# Patient Record
Sex: MALE | Race: WHITE | HISPANIC OR LATINO | Employment: UNEMPLOYED | URBAN - METROPOLITAN AREA
[De-identification: names, ages, dates, MRNs, and addresses within clinical notes are randomized per-mention and may not be internally consistent; named-entity substitution may affect disease eponyms.]

---

## 2019-09-10 ENCOUNTER — HOSPITAL ENCOUNTER (OUTPATIENT)
Dept: RADIOLOGY | Facility: MEDICAL CENTER | Age: 54
End: 2019-09-10

## 2019-09-10 ENCOUNTER — HOSPITAL ENCOUNTER (OUTPATIENT)
Facility: MEDICAL CENTER | Age: 54
End: 2019-09-10

## 2019-09-10 ENCOUNTER — ANESTHESIA (OUTPATIENT)
Dept: SURGERY | Facility: MEDICAL CENTER | Age: 54
DRG: 485 | End: 2019-09-10
Payer: COMMERCIAL

## 2019-09-10 ENCOUNTER — HOSPITAL ENCOUNTER (INPATIENT)
Facility: MEDICAL CENTER | Age: 54
LOS: 15 days | DRG: 485 | End: 2019-09-25
Attending: EMERGENCY MEDICINE | Admitting: HOSPITALIST
Payer: COMMERCIAL

## 2019-09-10 ENCOUNTER — ANESTHESIA EVENT (OUTPATIENT)
Dept: SURGERY | Facility: MEDICAL CENTER | Age: 54
DRG: 485 | End: 2019-09-10
Payer: COMMERCIAL

## 2019-09-10 DIAGNOSIS — M00.9 PYOGENIC ARTHRITIS OF LEFT KNEE JOINT, DUE TO UNSPECIFIED ORGANISM (HCC): ICD-10-CM

## 2019-09-10 DIAGNOSIS — M00.862 ARTHRITIS OF LEFT KNEE DUE TO OTHER BACTERIA (HCC): ICD-10-CM

## 2019-09-10 PROBLEM — E66.9 OBESITY: Status: ACTIVE | Noted: 2019-09-10

## 2019-09-10 PROBLEM — E78.5 HLD (HYPERLIPIDEMIA): Status: ACTIVE | Noted: 2019-09-10

## 2019-09-10 LAB
ERYTHROCYTE [SEDIMENTATION RATE] IN BLOOD BY WESTERGREN METHOD: 64 MM/HOUR (ref 0–20)
INR PPP: 1.13 (ref 0.87–1.13)
LACTATE BLD-SCNC: 1.8 MMOL/L (ref 0.5–2)
PROTHROMBIN TIME: 14.8 SEC (ref 12–14.6)

## 2019-09-10 PROCEDURE — 83605 ASSAY OF LACTIC ACID: CPT

## 2019-09-10 PROCEDURE — 160048 HCHG OR STATISTICAL LEVEL 1-5: Performed by: ORTHOPAEDIC SURGERY

## 2019-09-10 PROCEDURE — 160035 HCHG PACU - 1ST 60 MINS PHASE I: Performed by: ORTHOPAEDIC SURGERY

## 2019-09-10 PROCEDURE — 86140 C-REACTIVE PROTEIN: CPT

## 2019-09-10 PROCEDURE — 160036 HCHG PACU - EA ADDL 30 MINS PHASE I: Performed by: ORTHOPAEDIC SURGERY

## 2019-09-10 PROCEDURE — 500367 HCHG DRAIN KIT, HEMOVAC: Performed by: ORTHOPAEDIC SURGERY

## 2019-09-10 PROCEDURE — 160009 HCHG ANES TIME/MIN: Performed by: ORTHOPAEDIC SURGERY

## 2019-09-10 PROCEDURE — 160027 HCHG SURGERY MINUTES - 1ST 30 MINS LEVEL 2: Performed by: ORTHOPAEDIC SURGERY

## 2019-09-10 PROCEDURE — 87070 CULTURE OTHR SPECIMN AEROBIC: CPT

## 2019-09-10 PROCEDURE — 85610 PROTHROMBIN TIME: CPT

## 2019-09-10 PROCEDURE — 700102 HCHG RX REV CODE 250 W/ 637 OVERRIDE(OP)

## 2019-09-10 PROCEDURE — 87205 SMEAR GRAM STAIN: CPT

## 2019-09-10 PROCEDURE — 99291 CRITICAL CARE FIRST HOUR: CPT

## 2019-09-10 PROCEDURE — 700105 HCHG RX REV CODE 258: Performed by: ANESTHESIOLOGY

## 2019-09-10 PROCEDURE — 770001 HCHG ROOM/CARE - MED/SURG/GYN PRIV*

## 2019-09-10 PROCEDURE — 85652 RBC SED RATE AUTOMATED: CPT

## 2019-09-10 PROCEDURE — 99358 PROLONG SERVICE W/O CONTACT: CPT | Performed by: HOSPITALIST

## 2019-09-10 PROCEDURE — 500881 HCHG PACK, EXTREMITY: Performed by: ORTHOPAEDIC SURGERY

## 2019-09-10 PROCEDURE — 99223 1ST HOSP IP/OBS HIGH 75: CPT | Mod: AI | Performed by: HOSPITALIST

## 2019-09-10 PROCEDURE — 160002 HCHG RECOVERY MINUTES (STAT): Performed by: ORTHOPAEDIC SURGERY

## 2019-09-10 PROCEDURE — 501445 HCHG STAPLER, SKIN DISP: Performed by: ORTHOPAEDIC SURGERY

## 2019-09-10 PROCEDURE — 87075 CULTR BACTERIA EXCEPT BLOOD: CPT

## 2019-09-10 PROCEDURE — 700111 HCHG RX REV CODE 636 W/ 250 OVERRIDE (IP): Performed by: ANESTHESIOLOGY

## 2019-09-10 PROCEDURE — 87040 BLOOD CULTURE FOR BACTERIA: CPT | Mod: 91

## 2019-09-10 PROCEDURE — 160038 HCHG SURGERY MINUTES - EA ADDL 1 MIN LEVEL 2: Performed by: ORTHOPAEDIC SURGERY

## 2019-09-10 PROCEDURE — 0SBD0ZZ EXCISION OF LEFT KNEE JOINT, OPEN APPROACH: ICD-10-PCS | Performed by: ORTHOPAEDIC SURGERY

## 2019-09-10 PROCEDURE — 501838 HCHG SUTURE GENERAL: Performed by: ORTHOPAEDIC SURGERY

## 2019-09-10 PROCEDURE — A9270 NON-COVERED ITEM OR SERVICE: HCPCS

## 2019-09-10 PROCEDURE — 500440 HCHG DRESSING, STERILE ROLL (KERLIX): Performed by: ORTHOPAEDIC SURGERY

## 2019-09-10 RX ORDER — POLYETHYLENE GLYCOL 3350 17 G/17G
1 POWDER, FOR SOLUTION ORAL
Status: DISCONTINUED | OUTPATIENT
Start: 2019-09-10 | End: 2019-09-25 | Stop reason: HOSPADM

## 2019-09-10 RX ORDER — DEXAMETHASONE SODIUM PHOSPHATE 4 MG/ML
INJECTION, SOLUTION INTRA-ARTICULAR; INTRALESIONAL; INTRAMUSCULAR; INTRAVENOUS; SOFT TISSUE PRN
Status: DISCONTINUED | OUTPATIENT
Start: 2019-09-10 | End: 2019-09-10 | Stop reason: SURG

## 2019-09-10 RX ORDER — OXYCODONE HYDROCHLORIDE 10 MG/1
10 TABLET ORAL
Status: DISCONTINUED | OUTPATIENT
Start: 2019-09-10 | End: 2019-09-25 | Stop reason: HOSPADM

## 2019-09-10 RX ORDER — OXYCODONE HCL 5 MG/5 ML
10 SOLUTION, ORAL ORAL
Status: COMPLETED | OUTPATIENT
Start: 2019-09-10 | End: 2019-09-11

## 2019-09-10 RX ORDER — SODIUM CHLORIDE, SODIUM LACTATE, POTASSIUM CHLORIDE, CALCIUM CHLORIDE 600; 310; 30; 20 MG/100ML; MG/100ML; MG/100ML; MG/100ML
INJECTION, SOLUTION INTRAVENOUS CONTINUOUS
Status: DISCONTINUED | OUTPATIENT
Start: 2019-09-11 | End: 2019-09-11 | Stop reason: HOSPADM

## 2019-09-10 RX ORDER — ONDANSETRON 2 MG/ML
4 INJECTION INTRAMUSCULAR; INTRAVENOUS EVERY 4 HOURS PRN
Status: DISCONTINUED | OUTPATIENT
Start: 2019-09-10 | End: 2019-09-25 | Stop reason: HOSPADM

## 2019-09-10 RX ORDER — HYDROMORPHONE HYDROCHLORIDE 2 MG/ML
INJECTION, SOLUTION INTRAMUSCULAR; INTRAVENOUS; SUBCUTANEOUS PRN
Status: DISCONTINUED | OUTPATIENT
Start: 2019-09-10 | End: 2019-09-10 | Stop reason: SURG

## 2019-09-10 RX ORDER — OXYCODONE HYDROCHLORIDE 5 MG/1
5 TABLET ORAL
Status: DISCONTINUED | OUTPATIENT
Start: 2019-09-10 | End: 2019-09-25 | Stop reason: HOSPADM

## 2019-09-10 RX ORDER — SODIUM CHLORIDE, SODIUM LACTATE, POTASSIUM CHLORIDE, CALCIUM CHLORIDE 600; 310; 30; 20 MG/100ML; MG/100ML; MG/100ML; MG/100ML
INJECTION, SOLUTION INTRAVENOUS
Status: DISCONTINUED | OUTPATIENT
Start: 2019-09-10 | End: 2019-09-10 | Stop reason: SURG

## 2019-09-10 RX ORDER — PROMETHAZINE HYDROCHLORIDE 25 MG/1
12.5-25 TABLET ORAL EVERY 4 HOURS PRN
Status: DISCONTINUED | OUTPATIENT
Start: 2019-09-10 | End: 2019-09-25 | Stop reason: HOSPADM

## 2019-09-10 RX ORDER — OXYCODONE HCL 5 MG/5 ML
5 SOLUTION, ORAL ORAL
Status: COMPLETED | OUTPATIENT
Start: 2019-09-10 | End: 2019-09-11

## 2019-09-10 RX ORDER — ONDANSETRON 2 MG/ML
4 INJECTION INTRAMUSCULAR; INTRAVENOUS
Status: DISCONTINUED | OUTPATIENT
Start: 2019-09-10 | End: 2019-09-11 | Stop reason: HOSPADM

## 2019-09-10 RX ORDER — DIPHENHYDRAMINE HYDROCHLORIDE 50 MG/ML
12.5 INJECTION INTRAMUSCULAR; INTRAVENOUS
Status: DISCONTINUED | OUTPATIENT
Start: 2019-09-10 | End: 2019-09-11 | Stop reason: HOSPADM

## 2019-09-10 RX ORDER — HYDROMORPHONE HYDROCHLORIDE 1 MG/ML
0.1 INJECTION, SOLUTION INTRAMUSCULAR; INTRAVENOUS; SUBCUTANEOUS
Status: DISCONTINUED | OUTPATIENT
Start: 2019-09-10 | End: 2019-09-11 | Stop reason: HOSPADM

## 2019-09-10 RX ORDER — MEPERIDINE HYDROCHLORIDE 25 MG/ML
12.5 INJECTION INTRAMUSCULAR; INTRAVENOUS; SUBCUTANEOUS
Status: DISCONTINUED | OUTPATIENT
Start: 2019-09-10 | End: 2019-09-11 | Stop reason: HOSPADM

## 2019-09-10 RX ORDER — PROMETHAZINE HYDROCHLORIDE 25 MG/1
12.5-25 SUPPOSITORY RECTAL EVERY 4 HOURS PRN
Status: DISCONTINUED | OUTPATIENT
Start: 2019-09-10 | End: 2019-09-25 | Stop reason: HOSPADM

## 2019-09-10 RX ORDER — HYDROMORPHONE HYDROCHLORIDE 1 MG/ML
0.4 INJECTION, SOLUTION INTRAMUSCULAR; INTRAVENOUS; SUBCUTANEOUS
Status: DISCONTINUED | OUTPATIENT
Start: 2019-09-10 | End: 2019-09-11 | Stop reason: HOSPADM

## 2019-09-10 RX ORDER — HYDROMORPHONE HYDROCHLORIDE 1 MG/ML
0.2 INJECTION, SOLUTION INTRAMUSCULAR; INTRAVENOUS; SUBCUTANEOUS
Status: DISCONTINUED | OUTPATIENT
Start: 2019-09-10 | End: 2019-09-11 | Stop reason: HOSPADM

## 2019-09-10 RX ORDER — AMOXICILLIN 250 MG
2 CAPSULE ORAL 2 TIMES DAILY
Status: DISCONTINUED | OUTPATIENT
Start: 2019-09-10 | End: 2019-09-25 | Stop reason: HOSPADM

## 2019-09-10 RX ORDER — BISACODYL 10 MG
10 SUPPOSITORY, RECTAL RECTAL
Status: DISCONTINUED | OUTPATIENT
Start: 2019-09-10 | End: 2019-09-25 | Stop reason: HOSPADM

## 2019-09-10 RX ORDER — ONDANSETRON 2 MG/ML
INJECTION INTRAMUSCULAR; INTRAVENOUS PRN
Status: DISCONTINUED | OUTPATIENT
Start: 2019-09-10 | End: 2019-09-10 | Stop reason: SURG

## 2019-09-10 RX ORDER — SODIUM CHLORIDE 9 MG/ML
INJECTION, SOLUTION INTRAVENOUS CONTINUOUS
Status: DISCONTINUED | OUTPATIENT
Start: 2019-09-10 | End: 2019-09-11

## 2019-09-10 RX ORDER — ONDANSETRON 4 MG/1
4 TABLET, ORALLY DISINTEGRATING ORAL EVERY 4 HOURS PRN
Status: DISCONTINUED | OUTPATIENT
Start: 2019-09-10 | End: 2019-09-25 | Stop reason: HOSPADM

## 2019-09-10 RX ORDER — MORPHINE SULFATE 4 MG/ML
4 INJECTION, SOLUTION INTRAMUSCULAR; INTRAVENOUS
Status: DISCONTINUED | OUTPATIENT
Start: 2019-09-10 | End: 2019-09-25 | Stop reason: HOSPADM

## 2019-09-10 RX ORDER — HALOPERIDOL 5 MG/ML
1 INJECTION INTRAMUSCULAR
Status: DISCONTINUED | OUTPATIENT
Start: 2019-09-10 | End: 2019-09-11 | Stop reason: HOSPADM

## 2019-09-10 RX ORDER — OXYCODONE HCL 5 MG/5 ML
SOLUTION, ORAL ORAL
Status: COMPLETED
Start: 2019-09-10 | End: 2019-09-10

## 2019-09-10 RX ORDER — PROCHLORPERAZINE EDISYLATE 5 MG/ML
5-10 INJECTION INTRAMUSCULAR; INTRAVENOUS EVERY 4 HOURS PRN
Status: DISCONTINUED | OUTPATIENT
Start: 2019-09-10 | End: 2019-09-25 | Stop reason: HOSPADM

## 2019-09-10 RX ORDER — ACETAMINOPHEN 325 MG/1
650 TABLET ORAL EVERY 6 HOURS PRN
Status: CANCELLED | OUTPATIENT
Start: 2019-09-10

## 2019-09-10 RX ADMIN — SODIUM CHLORIDE, POTASSIUM CHLORIDE, SODIUM LACTATE AND CALCIUM CHLORIDE: 600; 310; 30; 20 INJECTION, SOLUTION INTRAVENOUS at 22:54

## 2019-09-10 RX ADMIN — OXYCODONE HYDROCHLORIDE 5 MG: 5 SOLUTION ORAL at 23:57

## 2019-09-10 RX ADMIN — Medication 5 MG: at 23:57

## 2019-09-10 RX ADMIN — ONDANSETRON 4 MG: 2 INJECTION INTRAMUSCULAR; INTRAVENOUS at 23:19

## 2019-09-10 RX ADMIN — LIDOCAINE HYDROCHLORIDE 100 MG: 20 INJECTION, SOLUTION INTRAVENOUS at 22:57

## 2019-09-10 RX ADMIN — HYDROMORPHONE HYDROCHLORIDE 1 MG: 2 INJECTION, SOLUTION INTRAMUSCULAR; INTRAVENOUS; SUBCUTANEOUS at 23:20

## 2019-09-10 RX ADMIN — HYDROMORPHONE HYDROCHLORIDE 1 MG: 2 INJECTION, SOLUTION INTRAMUSCULAR; INTRAVENOUS; SUBCUTANEOUS at 23:33

## 2019-09-10 RX ADMIN — VANCOMYCIN HYDROCHLORIDE 1 G: 1 INJECTION, POWDER, LYOPHILIZED, FOR SOLUTION INTRAVENOUS at 22:54

## 2019-09-10 RX ADMIN — PROPOFOL 200 MG: 10 INJECTION, EMULSION INTRAVENOUS at 22:57

## 2019-09-10 RX ADMIN — DEXAMETHASONE SODIUM PHOSPHATE 8 MG: 4 INJECTION, SOLUTION INTRA-ARTICULAR; INTRALESIONAL; INTRAMUSCULAR; INTRAVENOUS; SOFT TISSUE at 22:57

## 2019-09-10 ASSESSMENT — ENCOUNTER SYMPTOMS
DEPRESSION: 0
WEAKNESS: 0
CHILLS: 0
PALPITATIONS: 0
HALLUCINATIONS: 0
SHORTNESS OF BREATH: 0
FLANK PAIN: 0
VOMITING: 0
MYALGIAS: 0
HEARTBURN: 0
FOCAL WEAKNESS: 0
FEVER: 0
ABDOMINAL PAIN: 0
DIZZINESS: 0
BLURRED VISION: 0
DOUBLE VISION: 0
NAUSEA: 0
BRUISES/BLEEDS EASILY: 0
SENSORY CHANGE: 0
SPEECH CHANGE: 0
COUGH: 0
HEMOPTYSIS: 0
EYE DISCHARGE: 0

## 2019-09-10 ASSESSMENT — LIFESTYLE VARIABLES
DO YOU DRINK ALCOHOL: NO
SUBSTANCE_ABUSE: 0

## 2019-09-10 ASSESSMENT — PAIN SCALES - GENERAL: PAIN_LEVEL: 5

## 2019-09-11 PROBLEM — R73.9 ACUTE HYPERGLYCEMIA: Status: ACTIVE | Noted: 2019-09-11

## 2019-09-11 PROBLEM — E87.20 LACTIC ACIDOSIS: Status: ACTIVE | Noted: 2019-09-11

## 2019-09-11 LAB
ALBUMIN SERPL BCP-MCNC: 3.7 G/DL (ref 3.2–4.9)
ALBUMIN/GLOB SERPL: 1 G/DL
ALP SERPL-CCNC: 63 U/L (ref 30–99)
ALT SERPL-CCNC: 13 U/L (ref 2–50)
ANION GAP SERPL CALC-SCNC: 9 MMOL/L (ref 0–11.9)
AST SERPL-CCNC: 17 U/L (ref 12–45)
BASOPHILS # BLD AUTO: 0.2 % (ref 0–1.8)
BASOPHILS # BLD: 0.02 K/UL (ref 0–0.12)
BILIRUB SERPL-MCNC: 1.1 MG/DL (ref 0.1–1.5)
BUN SERPL-MCNC: 20 MG/DL (ref 8–22)
CALCIUM SERPL-MCNC: 8.8 MG/DL (ref 8.5–10.5)
CHLORIDE SERPL-SCNC: 105 MMOL/L (ref 96–112)
CO2 SERPL-SCNC: 21 MMOL/L (ref 20–33)
CREAT SERPL-MCNC: 0.92 MG/DL (ref 0.5–1.4)
CRP SERPL HS-MCNC: 19.99 MG/DL (ref 0–0.75)
EOSINOPHIL # BLD AUTO: 0 K/UL (ref 0–0.51)
EOSINOPHIL NFR BLD: 0 % (ref 0–6.9)
ERYTHROCYTE [DISTWIDTH] IN BLOOD BY AUTOMATED COUNT: 46.3 FL (ref 35.9–50)
EST. AVERAGE GLUCOSE BLD GHB EST-MCNC: 126 MG/DL
GLOBULIN SER CALC-MCNC: 3.6 G/DL (ref 1.9–3.5)
GLUCOSE BLD-MCNC: 170 MG/DL (ref 65–99)
GLUCOSE SERPL-MCNC: 240 MG/DL (ref 65–99)
GRAM STN SPEC: NORMAL
HBA1C MFR BLD: 6 % (ref 0–5.6)
HCT VFR BLD AUTO: 41.1 % (ref 42–52)
HGB BLD-MCNC: 13.6 G/DL (ref 14–18)
IMM GRANULOCYTES # BLD AUTO: 0.04 K/UL (ref 0–0.11)
IMM GRANULOCYTES NFR BLD AUTO: 0.4 % (ref 0–0.9)
LACTATE BLD-SCNC: 1.8 MMOL/L (ref 0.5–2)
LACTATE BLD-SCNC: 1.9 MMOL/L (ref 0.5–2)
LACTATE BLD-SCNC: 2.1 MMOL/L (ref 0.5–2)
LACTATE BLD-SCNC: 2.4 MMOL/L (ref 0.5–2)
LACTATE BLD-SCNC: 3 MMOL/L (ref 0.5–2)
LACTATE BLD-SCNC: 3.4 MMOL/L (ref 0.5–2)
LYMPHOCYTES # BLD AUTO: 0.56 K/UL (ref 1–4.8)
LYMPHOCYTES NFR BLD: 5.7 % (ref 22–41)
MCH RBC QN AUTO: 30.4 PG (ref 27–33)
MCHC RBC AUTO-ENTMCNC: 33.1 G/DL (ref 33.7–35.3)
MCV RBC AUTO: 91.7 FL (ref 81.4–97.8)
MONOCYTES # BLD AUTO: 0.48 K/UL (ref 0–0.85)
MONOCYTES NFR BLD AUTO: 4.9 % (ref 0–13.4)
NEUTROPHILS # BLD AUTO: 8.74 K/UL (ref 1.82–7.42)
NEUTROPHILS NFR BLD: 88.8 % (ref 44–72)
NRBC # BLD AUTO: 0 K/UL
NRBC BLD-RTO: 0 /100 WBC
PLATELET # BLD AUTO: 253 K/UL (ref 164–446)
PMV BLD AUTO: 10.1 FL (ref 9–12.9)
POTASSIUM SERPL-SCNC: 4.5 MMOL/L (ref 3.6–5.5)
PROT SERPL-MCNC: 7.3 G/DL (ref 6–8.2)
RBC # BLD AUTO: 4.48 M/UL (ref 4.7–6.1)
SIGNIFICANT IND 70042: NORMAL
SITE SITE: NORMAL
SODIUM SERPL-SCNC: 135 MMOL/L (ref 135–145)
SOURCE SOURCE: NORMAL
WBC # BLD AUTO: 9.8 K/UL (ref 4.8–10.8)

## 2019-09-11 PROCEDURE — 700111 HCHG RX REV CODE 636 W/ 250 OVERRIDE (IP): Performed by: ANESTHESIOLOGY

## 2019-09-11 PROCEDURE — 99233 SBSQ HOSP IP/OBS HIGH 50: CPT | Performed by: INTERNAL MEDICINE

## 2019-09-11 PROCEDURE — 36415 COLL VENOUS BLD VENIPUNCTURE: CPT

## 2019-09-11 PROCEDURE — 80053 COMPREHEN METABOLIC PANEL: CPT

## 2019-09-11 PROCEDURE — 770001 HCHG ROOM/CARE - MED/SURG/GYN PRIV*

## 2019-09-11 PROCEDURE — A9270 NON-COVERED ITEM OR SERVICE: HCPCS | Performed by: HOSPITALIST

## 2019-09-11 PROCEDURE — A9270 NON-COVERED ITEM OR SERVICE: HCPCS

## 2019-09-11 PROCEDURE — 83605 ASSAY OF LACTIC ACID: CPT | Mod: 91

## 2019-09-11 PROCEDURE — 97165 OT EVAL LOW COMPLEX 30 MIN: CPT

## 2019-09-11 PROCEDURE — 700102 HCHG RX REV CODE 250 W/ 637 OVERRIDE(OP): Performed by: HOSPITALIST

## 2019-09-11 PROCEDURE — 82962 GLUCOSE BLOOD TEST: CPT

## 2019-09-11 PROCEDURE — 97161 PT EVAL LOW COMPLEX 20 MIN: CPT

## 2019-09-11 PROCEDURE — 700105 HCHG RX REV CODE 258: Performed by: HOSPITALIST

## 2019-09-11 PROCEDURE — 85025 COMPLETE CBC W/AUTO DIFF WBC: CPT

## 2019-09-11 PROCEDURE — 700105 HCHG RX REV CODE 258: Performed by: INTERNAL MEDICINE

## 2019-09-11 PROCEDURE — 700102 HCHG RX REV CODE 250 W/ 637 OVERRIDE(OP): Performed by: NURSE PRACTITIONER

## 2019-09-11 PROCEDURE — 83036 HEMOGLOBIN GLYCOSYLATED A1C: CPT

## 2019-09-11 PROCEDURE — 700111 HCHG RX REV CODE 636 W/ 250 OVERRIDE (IP): Performed by: HOSPITALIST

## 2019-09-11 PROCEDURE — 700102 HCHG RX REV CODE 250 W/ 637 OVERRIDE(OP)

## 2019-09-11 PROCEDURE — 700111 HCHG RX REV CODE 636 W/ 250 OVERRIDE (IP)

## 2019-09-11 RX ORDER — OXYCODONE HCL 5 MG/5 ML
SOLUTION, ORAL ORAL
Status: COMPLETED
Start: 2019-09-11 | End: 2019-09-11

## 2019-09-11 RX ORDER — SODIUM CHLORIDE 9 MG/ML
INJECTION, SOLUTION INTRAVENOUS CONTINUOUS
Status: DISPENSED | OUTPATIENT
Start: 2019-09-11 | End: 2019-09-12

## 2019-09-11 RX ORDER — SODIUM CHLORIDE 9 MG/ML
1000 INJECTION, SOLUTION INTRAVENOUS ONCE
Status: COMPLETED | OUTPATIENT
Start: 2019-09-11 | End: 2019-09-11

## 2019-09-11 RX ADMIN — SENNOSIDES, DOCUSATE SODIUM 2 TABLET: 50; 8.6 TABLET, FILM COATED ORAL at 05:45

## 2019-09-11 RX ADMIN — FENTANYL CITRATE 25 MCG: 50 INJECTION, SOLUTION INTRAMUSCULAR; INTRAVENOUS at 00:01

## 2019-09-11 RX ADMIN — SODIUM CHLORIDE: 9 INJECTION, SOLUTION INTRAVENOUS at 13:30

## 2019-09-11 RX ADMIN — OXYCODONE HYDROCHLORIDE 10 MG: 10 TABLET ORAL at 17:15

## 2019-09-11 RX ADMIN — OXYCODONE HYDROCHLORIDE 10 MG: 10 TABLET ORAL at 11:39

## 2019-09-11 RX ADMIN — FENTANYL CITRATE 50 MCG: 50 INJECTION, SOLUTION INTRAMUSCULAR; INTRAVENOUS at 00:24

## 2019-09-11 RX ADMIN — OXYCODONE HYDROCHLORIDE 5 MG: 5 SOLUTION ORAL at 00:01

## 2019-09-11 RX ADMIN — SODIUM CHLORIDE 1000 ML: 9 INJECTION, SOLUTION INTRAVENOUS at 12:45

## 2019-09-11 RX ADMIN — OXYCODONE HYDROCHLORIDE 10 MG: 10 TABLET ORAL at 14:36

## 2019-09-11 RX ADMIN — SENNOSIDES, DOCUSATE SODIUM 2 TABLET: 50; 8.6 TABLET, FILM COATED ORAL at 17:15

## 2019-09-11 RX ADMIN — OXYCODONE HYDROCHLORIDE 10 MG: 10 TABLET ORAL at 05:49

## 2019-09-11 RX ADMIN — VANCOMYCIN HYDROCHLORIDE 2800 MG: 500 INJECTION, POWDER, LYOPHILIZED, FOR SOLUTION INTRAVENOUS at 01:47

## 2019-09-11 RX ADMIN — VANCOMYCIN HYDROCHLORIDE 1900 MG: 500 INJECTION, POWDER, LYOPHILIZED, FOR SOLUTION INTRAVENOUS at 18:15

## 2019-09-11 RX ADMIN — OXYCODONE HYDROCHLORIDE 10 MG: 10 TABLET ORAL at 21:01

## 2019-09-11 RX ADMIN — Medication 5 MG: at 00:01

## 2019-09-11 RX ADMIN — MORPHINE SULFATE 4 MG: 4 INJECTION INTRAVENOUS at 02:39

## 2019-09-11 RX ADMIN — SODIUM CHLORIDE: 9 INJECTION, SOLUTION INTRAVENOUS at 01:33

## 2019-09-11 RX ADMIN — OXYCODONE HYDROCHLORIDE 10 MG: 10 TABLET ORAL at 08:21

## 2019-09-11 RX ADMIN — OXYCODONE HYDROCHLORIDE 10 MG: 10 TABLET ORAL at 01:45

## 2019-09-11 RX ADMIN — MORPHINE SULFATE 4 MG: 4 INJECTION INTRAVENOUS at 21:55

## 2019-09-11 RX ADMIN — FENTANYL CITRATE 25 MCG: 50 INJECTION, SOLUTION INTRAMUSCULAR; INTRAVENOUS at 00:07

## 2019-09-11 RX ADMIN — MORPHINE SULFATE 4 MG: 4 INJECTION INTRAVENOUS at 18:15

## 2019-09-11 ASSESSMENT — GAIT ASSESSMENTS
DEVIATION: ANTALGIC;BRADYKINETIC;DECREASED HEEL STRIKE;DECREASED TOE OFF
DISTANCE (FEET): 35
ASSISTIVE DEVICE: FRONT WHEEL WALKER
GAIT LEVEL OF ASSIST: SUPERVISED

## 2019-09-11 ASSESSMENT — COGNITIVE AND FUNCTIONAL STATUS - GENERAL
SUGGESTED CMS G CODE MODIFIER DAILY ACTIVITY: CI
HELP NEEDED FOR BATHING: A LITTLE
DAILY ACTIVITIY SCORE: 22
DAILY ACTIVITIY SCORE: 23
MOBILITY SCORE: 18
WALKING IN HOSPITAL ROOM: A LITTLE
TURNING FROM BACK TO SIDE WHILE IN FLAT BAD: A LITTLE
MOVING FROM LYING ON BACK TO SITTING ON SIDE OF FLAT BED: A LITTLE
SUGGESTED CMS G CODE MODIFIER MOBILITY: CK
STANDING UP FROM CHAIR USING ARMS: A LITTLE
WALKING IN HOSPITAL ROOM: A LOT
CLIMB 3 TO 5 STEPS WITH RAILING: A LITTLE
MOBILITY SCORE: 16
SUGGESTED CMS G CODE MODIFIER DAILY ACTIVITY: CJ
MOVING TO AND FROM BED TO CHAIR: A LITTLE
DRESSING REGULAR LOWER BODY CLOTHING: A LITTLE
CLIMB 3 TO 5 STEPS WITH RAILING: A LOT
STANDING UP FROM CHAIR USING ARMS: A LITTLE
SUGGESTED CMS G CODE MODIFIER MOBILITY: CK
MOVING TO AND FROM BED TO CHAIR: A LITTLE
DRESSING REGULAR LOWER BODY CLOTHING: A LITTLE
TURNING FROM BACK TO SIDE WHILE IN FLAT BAD: A LITTLE
MOVING FROM LYING ON BACK TO SITTING ON SIDE OF FLAT BED: A LITTLE

## 2019-09-11 ASSESSMENT — LIFESTYLE VARIABLES
TOTAL SCORE: 0
HOW MANY TIMES IN THE PAST YEAR HAVE YOU HAD 5 OR MORE DRINKS IN A DAY: 0
DO YOU DRINK ALCOHOL: NO
EVER_SMOKED: NEVER
TOTAL SCORE: 0
HAVE PEOPLE ANNOYED YOU BY CRITICIZING YOUR DRINKING: NO
TOTAL SCORE: 0
EVER HAD A DRINK FIRST THING IN THE MORNING TO STEADY YOUR NERVES TO GET RID OF A HANGOVER: NO
CONSUMPTION TOTAL: NEGATIVE
EVER HAD A DRINK FIRST THING IN THE MORNING TO STEADY YOUR NERVES TO GET RID OF A HANGOVER: NO
ON A TYPICAL DAY WHEN YOU DRINK ALCOHOL HOW MANY DRINKS DO YOU HAVE: 0
HAVE YOU EVER FELT YOU SHOULD CUT DOWN ON YOUR DRINKING: NO
HAVE PEOPLE ANNOYED YOU BY CRITICIZING YOUR DRINKING: NO
EVER FELT BAD OR GUILTY ABOUT YOUR DRINKING: NO
AVERAGE NUMBER OF DAYS PER WEEK YOU HAVE A DRINK CONTAINING ALCOHOL: 0
ON A TYPICAL DAY WHEN YOU DRINK ALCOHOL HOW MANY DRINKS DO YOU HAVE: 0
TOTAL SCORE: 0
TOTAL SCORE: 0
HAVE YOU EVER FELT YOU SHOULD CUT DOWN ON YOUR DRINKING: NO
TOTAL SCORE: 0
ALCOHOL_USE: NO
AVERAGE NUMBER OF DAYS PER WEEK YOU HAVE A DRINK CONTAINING ALCOHOL: 0
CONSUMPTION TOTAL: NEGATIVE
EVER FELT BAD OR GUILTY ABOUT YOUR DRINKING: NO
HOW MANY TIMES IN THE PAST YEAR HAVE YOU HAD 5 OR MORE DRINKS IN A DAY: 0

## 2019-09-11 ASSESSMENT — ENCOUNTER SYMPTOMS
NAUSEA: 0
SHORTNESS OF BREATH: 0
FEVER: 0
CONSTIPATION: 0
CHILLS: 0
DIARRHEA: 0
PALPITATIONS: 0
DIZZINESS: 0
VOMITING: 0
FOCAL WEAKNESS: 0
ABDOMINAL PAIN: 0

## 2019-09-11 ASSESSMENT — PATIENT HEALTH QUESTIONNAIRE - PHQ9
1. LITTLE INTEREST OR PLEASURE IN DOING THINGS: NOT AT ALL
SUM OF ALL RESPONSES TO PHQ9 QUESTIONS 1 AND 2: 0
2. FEELING DOWN, DEPRESSED, IRRITABLE, OR HOPELESS: NOT AT ALL

## 2019-09-11 ASSESSMENT — ACTIVITIES OF DAILY LIVING (ADL): TOILETING: INDEPENDENT

## 2019-09-11 NOTE — PROGRESS NOTES
Pharmacy Kinetics 53 y.o. male on vancomycin day # 1 2019    New start vancomycin - 2800 mg loading dose given  Provider specified end date: TBD    Indication for Treatment: SSTI, septic arthritis    Pertinent history per medical record: Admitted on 9/10/2019 from [facility] for an orthopedic surgery consultation regarding a septic L knee. Approximately 3 days prior to admission he developed pain and swelling to his L knee with no acute injury. Pt has also never had knee surgery. At [facility] they did an arthrocentesis that had an elevated white count and GPC on the Gram stain. He was taken to the OR for I&D with drain placement on . Empiric vancomycin is being started    Other antibiotics: None    Allergies: Patient has no known allergies.     Concerns for renal function: BMI >30    Pertinent cultures to date:   · 9/10 Blood x2: in process  · 9/10 Wound: in process    No results for input(s): WBC, NEUTSPOLYS, BANDSSTABS in the last 72 hours.  No results for input(s): BUN, CREATININE, ALBUMIN in the last 72 hours.  No results for input(s): VANCOTROUGH, VANCOPEAK, VANCORANDOM in the last 72 hours.    Intake/Output Summary (Last 24 hours) at 2019 0142  Last data filed at 2019 0041  Gross per 24 hour   Intake 850 ml   Output --   Net 850 ml      /78   Pulse 99   Temp 37.2 °C (99 °F) (Temporal)   Resp 18   Ht 1.829 m (6')   Wt 111.1 kg (245 lb)   SpO2 95%  Temp (24hrs), Av.9 °C (98.5 °F), Min:36.3 °C (97.3 °F), Max:37.3 °C (99.2 °F)      A/P   1. Vancomycin dose change: Start 1900 mg IV q12h (@ 0200 & 1400 - first dose at 1400)  2. Next vancomycin level: In ~2 days (not ordered)  3. Goal trough: 12-16 mcg/mL  4. Comments: Pt started on vancomycin per protocol. Will need to follow outside facility cx as well as our own to help guide therapy. Checking BMP in the AM to make sure pt's renal function will support protocol dosing. Will check a trough as pt approaches steady  [facility]    Mikaela Baeza, PharmD, BCPS

## 2019-09-11 NOTE — ASSESSMENT & PLAN NOTE
-S/p I&D's 9/10/2019 and 9/20/2019.  -Pain and swelling continue to improve.  Continue pain control using narcotics, baclofen, & nonpharmacologic methods of pain control (e.g. Ice packs).  Wean off as tolerated.  -The patient states he has been ambulating with a front wheel walker.  Now able to TTWB.  Also mobilizing around the room throughout the day.  Has been in a chair for every meal, per patient report.  -Currently on IV ceftriaxone. IV vancomycin discontinued. Was managed by ID. Anticipated antibiotic end date is 10/8/2019.    -Continue working with PT/OT and the nursing staff on frequent mobilization.  -Recommend orthopedic follow up in 7-10 days after hospital discharge.

## 2019-09-11 NOTE — CONSULTS
DATE OF SERVICE:  09/10/2019    HISTORY OF PRESENT ILLNESS:  Patient is a 53-year-old who presented to the ER   after being transferred from [facility] after diagnosis of a   septic left knee joint was confirmed there.  He has been having increasing   knee pain for the last 2-3 days.  He cannot recall any specific injury or   break in the skin.  He was treated for 3 days of colchicine thinking this   might be gout, it was not helping.  He had an aspiration done at [facility]   as well as an ultrasound, which showed fluid collection in the medial soft   tissues.  He had an aspiration showing 112,000 white blood cells in his left   knee synovial fluid with 90% neutrophils and gram-positive cocci.    PAST MEDICAL HISTORY:  Includes hyperlipidemia.    SOCIAL HISTORY:  He is currently incarcerated.    MEDICATIONS:  None.    ALLERGIES:  No known drug allergies.    PHYSICAL EXAMINATION:  GENERAL:  He is afebrile.  VITAL SIGNS:  Stable.  EXTREMITIES:  Left lower extremity, he has a large effusion.  It is slightly   warm and red.  He has significant pain with any range of motion of the knee.    LABORATORY DATA:  Reviewed from [facility], which showed an increased cell   count of 112,000 from a left knee aspiration.    IMPRESSION AND PLAN:  This is a 53-year-old with left knee septic joint.  Plan   will be to take to the operating room for an irrigation and debridement.  We   will do this open, take further cultures and anticipate putting a drain in.    He will be admitted by the hospitalist service.  Anticipate IV antibiotics per   hospitalist service as well.       ____________________________________     MD YRIS STEIN / MEENAKSHI    DD:  09/10/2019 22:38:49  DT:  09/11/2019 01:24:19    D#:  [id number]  Job#:  [id number]

## 2019-09-11 NOTE — ED TRIAGE NOTES
Chief Complaint   Patient presents with   • Leg Pain     Pt BIB EMS and law enforcement.  Pt transfer with septic left knee.  Pt seen by orthopedics and plan is to go to surgery [facility].  Pt states 8/10 pain in knee.  Pt given 4 mg morphine prior to transfer.

## 2019-09-11 NOTE — ANESTHESIA PREPROCEDURE EVALUATION
Relevant Problems   Other   (+) Arthritis, septic (HCC)   (+) Obesity       Physical Exam    Airway   Mallampati: II  TM distance: >3 FB  Neck ROM: full       Cardiovascular - normal exam  Rhythm: regular  Rate: normal  (-) murmur     Dental - normal exam         Pulmonary - normal exam  Breath sounds clear to auscultation     Abdominal    Neurological - normal exam                 Anesthesia Plan    ASA 2- EMERGENT   ASA physical status emergent criteria: acutely contaminated wound or identified infection source    Plan - general       Airway plan will be LMA        Induction: intravenous    Postoperative Plan: Postoperative administration of opioids is intended.    Pertinent diagnostic labs and testing reviewed    Informed Consent:    Anesthetic plan and risks discussed with patient.

## 2019-09-11 NOTE — OR NURSING
2339: Pt arrived from OR. Monitors applied and report received from MD and RN. Pt sedated on arrival with LMA in place. VSS with 6 L O2 via blow-by. Left leg elevated on pillows. Ice pack applied.     0040: Pt A&O. VSS on 2 L O2 via NC. Left knee elevated on pillows and ice pack in place. Following pain medication administration, pt reports pain at a more tolerable level. Surgical incision CDI. Hemovac compressed. Scant drainage in collection device.   Pt tolerating PO.   Report called to DIANNE Parkinson. Waiting for transport.

## 2019-09-11 NOTE — ASSESSMENT & PLAN NOTE
-Profile done 9/22/19 showed mildly low HDL but was otherwise normal.  Will continue home atorvastatin.   -Follow-up outpatient.

## 2019-09-11 NOTE — H&P
Hospital Medicine History & Physical Note    Date of Service  9/10/2019    Primary Care Physician  No primary care provider on file.    Consultants  ortho    Code Status  full    Chief Complaint  Left knee swelling, pain     History of Presenting Illness  53 y.o. male who presented 9/10/2019 with past medical history of hyperlipidemia presents with left knee swelling.  Patient is transferred from outside hospital for evaluation of septic left knee.  He started developing left knee pain and swelling over the last several days.  He works on the floor in the long term.  His pain significant worsened while he was crawling on the ground.  Is also had throbbing severe progressively worsening pain no fever no chills no sweats.  Outside hospital arthrocentesis reveals markedly elevated WBC counts consistent with septic arthritis.  An initial preliminary Gram stain showed gram-positive cocci.  Will be admitted to the hospital for surgical evaluation    Upon review of outside hospital records vital signs temperature 36.4 97% on room air respiratory rate 18 heart rate 74 blood pressure 134/86 ultrasound venous Doppler unilateral left leg no DVT is present in the left lower extremity fluid collection seen medially in the soft tissues by the knee extending into the calf posterior measuring 7 x 2 cm WBC count 8.8 hemoglobin 14.5 platelet count 228 glucose 104 BUN 18 creatinine 0.7  sodium 140 potassium 4.8 CO2 22 anion gap 12 calcium 9.1 uric acid 4.3 LFTs unremarkable  Arthrocentesis performed at outside hospital yellow cloudy total neutrophil count 112,000, red blood cell count 4000    Review of Systems  Review of Systems   Constitutional: Negative for chills and fever.   HENT: Negative for congestion, hearing loss and tinnitus.    Eyes: Negative for blurred vision, double vision and discharge.   Respiratory: Negative for cough, hemoptysis and shortness of breath.    Cardiovascular: Positive for leg swelling. Negative for  chest pain and palpitations.   Gastrointestinal: Negative for abdominal pain, heartburn, nausea and vomiting.   Genitourinary: Negative for dysuria and flank pain.   Musculoskeletal: Positive for joint pain. Negative for myalgias.   Skin: Negative for rash.   Neurological: Negative for dizziness, sensory change, speech change, focal weakness and weakness.   Endo/Heme/Allergies: Negative for environmental allergies. Does not bruise/bleed easily.   Psychiatric/Behavioral: Negative for depression, hallucinations and substance abuse.       Past Medical History  hld    Surgical History  Reviewed and not pertinent    Family History  Reviewed and not pertinent     Social History  No alcohol use, no drug use or smoking    Allergies  No Known Allergies    Medications  None       Physical Exam  Temp:  [36.6 °C (97.9 °F)] 36.6 °C (97.9 °F)    Physical Exam   Constitutional: He is oriented to person, place, and time. He appears well-developed and well-nourished. He appears distressed.   HENT:   Head: Normocephalic and atraumatic.   Eyes: Pupils are equal, round, and reactive to light. Conjunctivae and EOM are normal.   Neck: Normal range of motion. Neck supple. No JVD present.   Cardiovascular: Normal rate, regular rhythm, normal heart sounds and intact distal pulses.   No murmur heard.  Pulmonary/Chest: Effort normal and breath sounds normal. No respiratory distress. He exhibits no tenderness.   Abdominal: Soft. Bowel sounds are normal. He exhibits no distension. There is no tenderness.   Musculoskeletal: Normal range of motion. He exhibits edema.   Left knee edema pain with ROM    Neurological: He is alert and oriented to person, place, and time. No cranial nerve deficit. He exhibits normal muscle tone.   Skin: Skin is warm and dry. No erythema.   Psychiatric: He has a normal mood and affect. His behavior is normal. Judgment and thought content normal.   Nursing note and vitals reviewed.      Laboratory:     No results for  input(s): WBC, RBC, HEMOGLOBIN, HEMATOCRIT, MCV, MCH, RDW, PLATELETCT, MPV, NEUTSPOLYS, LYMPHOCYTES, MONOCYTES, EOSINOPHILS, BASOPHILS, RBCMORPHOLO in the last 72 hours.        No results for input(s): ALTSGPT, ASTSGOT, ALKPHOSPHAT, TBILIRUBIN, DBILIRUBIN, GAMMAGT, AMYLASE, LIPASE, ALB, PREALBUMIN, GLUCOSE in the last 72 hours.      No results for input(s): NTPROBNP in the last 72 hours.      No results for input(s): TROPONINT in the last 72 hours.  vital signs temperature 36.4 97% on room air respiratory rate 18 heart rate 74 blood pressure 134/86 ultrasound venous Doppler unilateral left leg no DVT is present in the left lower extremity fluid collection seen medially in the soft tissues by the knee extending into the calf posterior measuring 7 x 2 cm WBC count 8.8 hemoglobin 14.5 platelet count 228 glucose 104 BUN 18 creatinine 0.7  sodium 140 potassium 4.8 CO2 22 anion gap 12 calcium 9.1 uric acid 4.3 LFTs unremarkable    Urinalysis:    No results found     Imaging:  US-FOREIGN FILM ULTRASOUND   Final Result        Arthrocentesis performed at outside hospital yellow cloudy total neutrophil count 112,000, red blood cell count 4000      Assessment/Plan:  I anticipate this patient will require at least two midnights for appropriate medical management, necessitating inpatient admission.    * Arthritis, septic (HCC)  Assessment & Plan  Left knee septic arthritis with greater than 100,000 WBC on arthrocentesis   Admit to surgery   NPO, likely surgical intevention tonight  IV abx   Pt/ot pain control   Follow cultures   Need to follow arthrocentesis cultures at OSH  Consider ID consult in am   Esr/crp/procal     HLD (hyperlipidemia)  Assessment & Plan  Resume home medications when appropriate    Obesity  Assessment & Plan  Encouraged weight loss      VTE prophylaxis: scd    I spent a total of 32 minutes of non face to face time performing additional research, reviewing old medical records, provided on going  management by following up on labs, placing orders, discussing plan of care with other healthcare providers and nursing staff. Start time: 10:11 pm. End time: 10:43 pm

## 2019-09-11 NOTE — PROGRESS NOTES
0110: Pt arrived to , security officers at bedside. Pt reports pain 10/10; awaiting pharmacy to verify orders. Pt A+OX4. Respirations even and nonlabored. 2 RN skin check completed with DIANNE Stephen. Surgical site noted to left knee with hemovac in place. No other skin issues noted. Pt oriented to room, callbell in reach.

## 2019-09-11 NOTE — THERAPY
"Physical Therapy Evaluation completed.   Bed Mobility:  Supine to Sit: Supervised  Transfers: Sit to Stand: Supervised  Gait: Level Of Assist: Supervised with Front-Wheel Walker       Plan of Care: Patient with no further skilled PT needs in the acute care setting and demonstrates safety with mobility in this environment at this time.   Discharge Recommendations: Equipment: Front-Wheel Walker. Recommend outpatient physical therapy services to address higher level deficits.    See \"Rehab Therapy-Acute\" Patient Summary Report for complete documentation.     Pt is a 54yo male prisoner presenting to acute with septic L knee, s/p I&D, WBAT. Provided cues for sequencing gait, xfrs, and FWW management. Pt with good return demo and performed all mobility at SPV level. Pt with decreased tolerance for weight shifting onto LLE, but functional for DC. Pt would benefit from a FWW for DC. Patient will not be actively followed for physical therapy services at this time, however may be seen if requested by physician for 1 more visit within 30 days to address any discharge or equipment needs.  "

## 2019-09-11 NOTE — ANESTHESIA TIME REPORT
Anesthesia Start and Stop Event Times     Date Time Event    9/10/2019 2240 Ready for Procedure     2254 Anesthesia Start     2341 Anesthesia Stop        Responsible Staff  09/10/19    Name Role Begin End    Tobey Gansert, M.D. Anesth 2254 2341        Preop Diagnosis (Free Text):  Pre-op Diagnosis     SEPTIC LEFT KNEE        Preop Diagnosis (Codes):    Post op Diagnosis  Septic arthritis (HCC)      Premium Reason  A. 3PM - 7AM    Comments:

## 2019-09-11 NOTE — THERAPY
"Occupational Therapy Evaluation completed.   Functional Status:  Spv w/bed mobility, spv w/sit>stand, min A w/LB dressing mostly d/t pain and chains in place per protocol. Pt walking in room w/fww and spv, completed low toilet txf I'ly, then BTB post session. Rn aware of session and pts efforts.   Plan of Care: Patient with no further skilled OT needs in the acute care setting at this time  Discharge Recommendations:  Equipment: Front-Wheel Walker. Post-acute therapy Anticipate that the patient will have no further occupational therapy needs after discharge from the hospital.       See \"Rehab Therapy-Acute\" Patient Summary Report for complete documentation.    53 yr old male admitted for leg pain, pt dx w/ septic knee, pt is s/p I&D pt is WBAT w/drain, pt has post op pain and edema, however at this time demonstrated ability to complete most ADL's and txfs. Pt will require a fww for mobility at d/c. Anticipate no further OT needs   "

## 2019-09-11 NOTE — PROGRESS NOTES
Orthopaedic Progress Note    Author: Mai Boland Date & Time created: 9/11/2019   9:47 AM     Interval Events:  POD # 1 I&D knee, septic knee.   Doing ok.   Having pain. Hasn't yet.   Voiding.   Tolerating diet.     Review of Systems   Constitutional: Negative for chills and fever.   Respiratory: Negative for shortness of breath.    Cardiovascular: Negative for chest pain.   Gastrointestinal: Negative for abdominal pain, nausea and vomiting.   Neurological: Negative for dizziness.     Hemodynamics:  /76   Pulse 83   Temp 36.2 °C (97.2 °F) (Temporal)   Resp 17   Ht 1.829 m (6')   Wt 111.1 kg (245 lb)   SpO2 96%      Current Precaution Orders   Procedures   • WEIGHT BEARING STATUS     Standing Status:   Standing     Number of Occurrences:   1     Order Specific Question:   What is the patients weight bearing status?     Answer:   WEIGHT BEARING AS TOLERATED   • WEIGHT BEARING STATUS     Standing Status:   Standing     Number of Occurrences:   1     Order Specific Question:   What is the patients weight bearing status?     Answer:   WEIGHT BEARING AS TOLERATED     Respiratory:    Respiration: 17, Pulse Oximetry: 96 %           Fluids:    Intake/Output Summary (Last 24 hours) at 9/11/2019 0947  Last data filed at 9/11/2019 0800  Gross per 24 hour   Intake 970 ml   Output 820 ml   Net 150 ml     Admit Weight: 111.1 kg (245 lb)  Current Weight: 111.1 kg (245 lb)    Physical Exam   Constitutional: He is oriented to person, place, and time. He appears well-developed and well-nourished. No distress.   Cardiovascular: Intact distal pulses.   Pulmonary/Chest: Effort normal. No respiratory distress.   Musculoskeletal:   DNVI. PF/DF intact. No calf ttp redness, heat.    Neurological: He is alert and oriented to person, place, and time.   Skin: Skin is warm and dry. He is not diaphoretic.   Min drainage from dressing. Hemovac in place. Otherwise c/d/i, approximated. No streaking noted. Mild effusion.       Labs:  Recent Labs     09/11/19  0346   WBC 9.8   RBC 4.48*   HEMOGLOBIN 13.6*   HEMATOCRIT 41.1*   MCV 91.7   MCH 30.4   MCHC 33.1*   RDW 46.3   PLATELETCT 253   MPV 10.1     Recent Labs     09/11/19  0346   SODIUM 135   POTASSIUM 4.5   CHLORIDE 105   CO2 21   GLUCOSE 240*   BUN 20   CREATININE 0.92   CALCIUM 8.8       Medical Decision Making/Problem List:    Active Hospital Problems    Diagnosis   • *Arthritis, septic (HCC) [M00.9]   • Obesity [E66.9]   • HLD (hyperlipidemia) [E78.5]     Core Measures & Quality Metrics:  Current DVT prophylaxis: WBAT. Ambulation with PT, eval today. Discuss dvt prophylaxis with Dr. Coon.   Discussed patient condition with Patient.  Clearance for lovenox/heparin:   Weight Bearing Status: WBAT.   Wounds & Drains: Keep incision clean and dry. Hvac care.   Disposition and Follow-up: PT/OT. WBAT. Monitor incision.   Antibiotics per cultures and Infectious Disease.

## 2019-09-11 NOTE — PROGRESS NOTES
HOSPITAL MEDICINE PROGRESS NOTE    Date of service  9/11/2019    Chief Complaint  Leg Pain      Hospital Course:    52 yo man p/w left septic knee         Interval History Updates:  Hospital Day #Hospital Day: 2   No event overnight.  Afebrile.    Surgical washout of left knee yesterday.  Serum glucose high, 240+    Consultants/Specialty  Orthopaedics    Review of Systems   Review of Systems   Constitutional: Negative for chills and fever.   Respiratory: Negative for shortness of breath.    Cardiovascular: Negative for chest pain, palpitations and leg swelling.   Gastrointestinal: Negative for abdominal pain, constipation, diarrhea, nausea and vomiting.   Musculoskeletal: Positive for joint pain.   Skin: Negative for rash.   Neurological: Negative for focal weakness.   Endo/Heme/Allergies: Negative.    All other systems reviewed and are negative.       Medications:  • vancomycin  17 mg/kg Q12HR   • insulin regular  2-10 Units 4X/DAY ACHS   • glucose  16 g Q15 MIN PRN    And   • dextrose 10% bolus  250 mL Q15 MIN PRN   • MD Alert...Vancomycin per Pharmacy   PHARMACY TO DOSE   • senna-docusate  2 Tab BID    And   • polyethylene glycol/lytes  1 Packet QDAY PRN    And   • magnesium hydroxide  30 mL QDAY PRN    And   • bisacodyl  10 mg QDAY PRN   • Pharmacy Consult Request  1 Each PHARMACY TO DOSE    And   • oxyCODONE immediate-release  5 mg Q3HRS PRN    And   • oxyCODONE immediate-release  10 mg Q3HRS PRN    And   • morphine injection  4 mg Q3HRS PRN   • ondansetron  4 mg Q4HRS PRN   • ondansetron  4 mg Q4HRS PRN   • promethazine  12.5-25 mg Q4HRS PRN   • promethazine  12.5-25 mg Q4HRS PRN   • prochlorperazine  5-10 mg Q4HRS PRN       Physical Exam   Vitals:    09/11/19 0300 09/11/19 0500 09/11/19 0800 09/11/19 1200   BP: 130/78 118/68 128/76 124/79   Pulse: 78 72 83 85   Resp: 16 18 17 17   Temp: 35.9 °C (96.6 °F) 35.9 °C (96.7 °F) 36.2 °C (97.2 °F) 36.9 °C (98.5 °F)   TempSrc: Temporal Temporal Temporal Temporal    SpO2: 95% 93% 96% 93%   Weight:       Height:           Physical Exam   Constitutional: He is oriented to person, place, and time and well-developed, well-nourished, and in no distress. No distress.   HENT:   Head: Normocephalic and atraumatic.   Eyes: Pupils are equal, round, and reactive to light. Conjunctivae are normal. No scleral icterus.   Neck: Normal range of motion. Neck supple. No JVD present.   Cardiovascular: Normal rate, regular rhythm and normal heart sounds. Exam reveals no gallop and no friction rub.   No murmur heard.  Pulmonary/Chest: Effort normal and breath sounds normal. No respiratory distress. He has no wheezes. He has no rales. He exhibits no tenderness.   Abdominal: Soft. Bowel sounds are normal. There is no tenderness. There is no rebound.   Musculoskeletal: He exhibits tenderness. He exhibits no edema or deformity.   Left knee in dressing C/D/I.  A cordion drain is in place with dark maroon fluid.   Neurological: He is alert and oriented to person, place, and time.   Skin: Skin is warm and dry. No rash noted. He is not diaphoretic. No erythema.   Psychiatric: Mood and affect normal.   Nursing note and vitals reviewed.       Recent Labs     09/11/19  0346   WBC 9.8   RBC 4.48*   HEMOGLOBIN 13.6*   HEMATOCRIT 41.1*   MCV 91.7   MCH 30.4   MCHC 33.1*   RDW 46.3   PLATELETCT 253   MPV 10.1      Laboratory   Recent Labs     09/11/19  0346   WBC 9.8   RBC 4.48*   HEMOGLOBIN 13.6*   HEMATOCRIT 41.1*   PLATELETCT 253     Recent Labs     09/11/19  0346   SODIUM 135   POTASSIUM 4.5   CHLORIDE 105   CO2 21   GLUCOSE 240*   BUN 20   CREATININE 0.92      Recent Labs     09/11/19  0346   ALTSGPT 13   ASTSGOT 17   ALKPHOSPHAT 63   TBILIRUBIN 1.1   GLUCOSE 240*      Recent Labs     09/10/19  2225   INR 1.13      Lactic 2.1, 1.9, 3.4.    ESR 64  CRP 20       No results found for: BLOODCULTU, BLDCULT, BCHOLD      Labs reviewed by me and noted above.    Radiology  No image results found.       No results  found for this or any previous visit.       Assessment and Plan    Principal Problem:    Arthritis, septic (HCC) POA: Yes.  S/p I&D 9/10.  Culture of fluid pending.  GS gram positive cocci.  No obvious source per HPI and speaking to patient.  Cont vancomycin empirically pending culture ID/SENS.  Drain management per Ortho.  Discussed with Orthopaedic Surgery.      Sepsis (new).  Without organ dysfunction.  Source is left infected knee.  Was tachycardic with elevated lactic acidosis.  Bolus IVF as discussed below.  Trend out lactic acid.        Lactic acidosis POA: Yes.  Resolved then went back up.  Most likely due to septic arthitic.  Hemodynamics stable.  I order NS 1L bolus x1.  Repeat lactic acid after NS bolus.      Acute hyperglycemia POA: Yes.  No h/o DM2.  Could be due to septic knee/infection/sepsis physiology.  Start ISS.  Check A1C.      HLD (hyperlipidemia) POA: Yes.  Medication needs reconciliation still.      DVT prophylaxis: SCD    CODE STATUS:  FULL CODE    Disposition: Anticipate [facility] in 3-4 days.    Case was discussed with Primary RN and Charge Nurse, Medical SW, , and Pharmacist on IDTround in addition to individual(s) mentioned above.    I have performed a physical exam and reviewed and updated ROS as of today, 9/11/2019.  In review of yesterday's note dated, 9/10/2019, there are no changes except as documented above.      Liliya Terrell M.D.

## 2019-09-11 NOTE — ANESTHESIA POSTPROCEDURE EVALUATION
Patient: Four EDAlma    Procedure Summary     Date:  09/10/19 Room / Location:  [place] [address] / SURGERY [facility]    Anesthesia Start:  2254 Anesthesia Stop:  2341    Procedure:  IRRIGATION AND DEBRIDEMENT, WOUND (Left Knee) Diagnosis:  (SEPTIC LEFT KNEE)    Surgeon:  Brad Coon M.D. Responsible Provider:  Tobey Gansert, M.D.    Anesthesia Type:  general ASA Status:  2 - Emergent          Final Anesthesia Type: general  Last vitals  BP   Blood Pressure: 144/84    Temp   37.3 °C (99.2 °F)    Pulse   Pulse: 86   Resp   18    SpO2   96 %      Anesthesia Post Evaluation    Patient location during evaluation: PACU  Patient participation: complete - patient participated  Level of consciousness: awake and alert  Pain score: 5    Airway patency: patent  Anesthetic complications: no  Cardiovascular status: hemodynamically stable  Respiratory status: acceptable  Hydration status: euvolemic    PONV: none

## 2019-09-11 NOTE — ED PROVIDER NOTES
ED Provider Note    Scribed for Bravo Styles M.D. by Bravo Styles. 9/10/2019,  10:10 PM.    CHIEF COMPLAINT  Chief Complaint   Patient presents with   • Leg Pain       HPI  Four EDAlma is a 53 y.o. male who presents to the Emergency Department as a transfer from [facility] for orthopedic consultation, after a septic left knee joint was confirmed there.  He presented to the transferring hospital 3 days after developing left knee pain and swelling.  He remembered a minor contusion type injury about a month prior, but does not recall a break in the skin at that time.  He said that he noticed worsening pain while crawling 3 days ago.  He reports constant, progressive, severe, throbbing pain.  It is much worse with movement or palpitation.  There are no relieving factors that completely make the pain go away.  He said no previous significant injuries to this knee including no broken bones or surgeries.  He was treated with 3 days of colchicine, thinking this might have been gout, but this did not help.  He denies fevers or chills or nausea or vomiting or chest pain or shortness of breath.    I reviewed his outside hospital studies, which showed a negative DVT ultrasound of the left lower extremity.  It did note a 7 x 2 cm fluid collection in the medial soft tissues of the knee, raising the possibility of a liquefied hematoma.  His CBC and differential were unremarkable.  His chemistries were also reassuring.  He had greater than 112,000 white blood cells in his left knee synovial fluid, with 90% neutrophils, and a preliminary Gram stain that showed gram-positive cocci.  He received a liter of normal saline, 4.5 g of Zosyn, 1.75 g of vancomycin, 4 mg of morphine, and a gram of Tylenol prior to transfer.  Dr. Coon, on-call for orthopedics here, was made aware of the patient, and met him at the bedside immediately on arrival.  He plans to take the patient to the operating room tonight, and  requested hospitalist admission.    REVIEW OF SYSTEMS  See HPI for further details. All other systems are negative.     PAST MEDICAL HISTORY   has a past medical history of Hyperlipemia.    SOCIAL HISTORY  Social History     Tobacco Use   • Smoking status: Never Smoker   • Smokeless tobacco: Never Used   Substance and Sexual Activity   • Alcohol use: Not Currently   • Drug use: Not Currently   • Sexual activity: Not on file     Social History     Substance and Sexual Activity   Drug Use Not Currently       SURGICAL HISTORY  patient denies any surgical history    CURRENT MEDICATIONS  Home Medications     Reviewed by Lucinda Ramirez R.N. (Registered Nurse) on 09/10/19 at 2221  Med List Status: Partial   Medication Last Dose Status   bisacodyl (DULCOLAX) suppository 10 mg  Active   magnesium hydroxide (MILK OF MAGNESIA) suspension 30 mL  Active   MD Alert...Vancomycin per Pharmacy  Active   MD Alert...Vancomycin per Pharmacy  Active   morphine (pf) 4 mg/ml injection 4 mg  Active   NS infusion  Active   ondansetron (ZOFRAN ODT) dispertab 4 mg  Active   ondansetron (ZOFRAN) syringe/vial injection 4 mg  Active   oxyCODONE immediate-release (ROXICODONE) tablet 10 mg  Active   oxyCODONE immediate-release (ROXICODONE) tablet 5 mg  Active   Pharmacy Consult Request ...Pain Management Review 1 Each  Active   piperacillin-tazobactam (ZOSYN) 3.375 g in  mL IVPB  Active   piperacillin-tazobactam (ZOSYN) 3.375 g in  mL IVPB  Active   polyethylene glycol/lytes (MIRALAX) PACKET 1 Packet  Active   prochlorperazine (COMPAZINE) injection 5-10 mg  Active   promethazine (PHENERGAN) suppository 12.5-25 mg  Active   promethazine (PHENERGAN) tablet 12.5-25 mg  Active   senna-docusate (PERICOLACE or SENOKOT S) 8.6-50 MG per tablet 2 Tab  Active                ALLERGIES  No Known Allergies    PHYSICAL EXAM  VITAL SIGNS: /95   Pulse 78   Temp 36.6 °C (97.9 °F) (Temporal)   Resp 16   Ht 1.829 m (6')   Wt 111.1 kg (245  lb)   SpO2 97%   BMI 33.23 kg/m²   Pulse ox interpretation: I interpret this pulse ox as normal.  Constitutional: Alert in no apparent distress.  HENT: No signs of trauma, Bilateral external ears normal, Nose normal.   Eyes: Conjunctiva normal, Non-icteric.   Neck: Normal range of motion, Supple, No stridor.   Lymphatic: No lymphadenopathy noted.   Cardiovascular: Regular rate and rhythm, no murmurs.   Thorax & Lungs: Normal breath sounds, No respiratory distress, No wheezing, No chest tenderness.   Skin: Diffuse, mild, poorly demarcated erythema of the left knee.  Extremities: Intact distal pulses, edematous left knee circumferentially, No cyanosis.  Musculoskeletal: Left knee range of motion limited secondary to pain.  Neurologic: Alert , Normal motor function, Normal sensory function, No focal deficits noted.   Psychiatric: Affect normal, Judgment normal, Mood normal.     DIAGNOSTIC STUDIES / PROCEDURES      LABS  Labs Reviewed   LACTIC ACID   LACTIC ACID   PROTHROMBIN TIME   BLOOD CULTURE   BLOOD CULTURE   WESTERGREN SED RATE   CRP QUANTITIVE (NON-CARDIAC)     All labs reviewed by me.    RADIOLOGY  US-FOREIGN FILM ULTRASOUND   Final Result        The radiologist's interpretation of all radiological studies have been reviewed by me.    COURSE & MEDICAL DECISION MAKING  Nursing notes, VS, PMSFHx reviewed in chart.     10:10 PM Patient seen and examined at bedside.  He has a confirmed left knee septic joint.  Orthopedics has been at the bedside and plans to take the patient to the operating room later.  If paged the hospitalist service for admission.  The patient is not currently due for additional antibiotics.  Ordered for repeat labs to evaluate.     10:20 PM Dr. Grubbs agrees to admit.      DISPOSITION:  Patient will be admitted to Dr. Grubbs in stable condition.      FINAL IMPRESSION  1. Pyogenic arthritis of left knee joint, due to unspecified organism (HCC)

## 2019-09-11 NOTE — ANESTHESIA PROCEDURE NOTES
Airway  Date/Time: 9/10/2019 10:57 PM  Performed by: Tobey Gansert, M.D.  Authorized by: Tobey Gansert, M.D.     Location:  OR  Urgency:  Elective  Indications for Airway Management:  Anesthesia  Spontaneous Ventilation: absent    Sedation Level:  Deep  Preoxygenated: Yes    Final Airway Type:  Supraglottic airway  Final Supraglottic Airway:  Standard LMA  SGA Size:  5  Number of Attempts at Approach:  1

## 2019-09-11 NOTE — ANESTHESIA QCDR
2019 [facility] Clinical Data Registry (for Quality Improvement)     Postoperative nausea/vomiting risk protocol (Adult = 18 yrs and Pediatric 3-17 yrs)- (430 and 463)  General inhalation anesthetic (NOT TIVA) with PONV risk factors: Yes  Provision of anti-emetic therapy with at least 2 different classes of agents: Yes   Patient DID NOT receive anti-emetic therapy and reason is documented in Medical Record:  N/A    Multimodal Pain Management- (AQI59)  Patient undergoing Elective Surgery (i.e. Outpatient, or ASC, or Prescheduled Surgery prior to Hospital Admission): No  Use of Multimodal Pain Management, two or more drugs and/or interventions, NOT including systemic opioids: N/A  Exception: Documented allergy to multiple classes of analgesics: N/A    PACU assessment of acute postoperative pain prior to Anesthesia Care End- Applies to Patients Age = 18- (ABG7)  Initial PACU pain score is which of the following: < 7/10  Patient unable to report pain score: N/A    Post-anesthetic transfer of care checklist/protocol to PACU/ICU- (426 and 427)  Upon conclusion of case, patient transferred to which of the following locations: PACU/Non-ICU  Use of transfer checklist/protocol: Yes  Exclusion: Service Performed in Patient Hospital Room (and thus did not require transfer): N/A    PACU Reintubation- (AQI31)  General anesthesia requiring endotracheal intubation (ETT) along with subsequent extubation in OR or PACU: No  Required reintubation in the PACU: N/A  Extubation was a planned trial documented in the medical record prior to removal of the original airway device: N/A    Unplanned admission to ICU related to anesthesia service up through end of PACU care- (MD51)  Unplanned admission to ICU (not initially anticipated at anesthesia start time): No

## 2019-09-12 LAB
ANION GAP SERPL CALC-SCNC: 9 MMOL/L (ref 0–11.9)
BASOPHILS # BLD AUTO: 0.2 % (ref 0–1.8)
BASOPHILS # BLD: 0.02 K/UL (ref 0–0.12)
BUN SERPL-MCNC: 17 MG/DL (ref 8–22)
CALCIUM SERPL-MCNC: 8.7 MG/DL (ref 8.5–10.5)
CHLORIDE SERPL-SCNC: 105 MMOL/L (ref 96–112)
CO2 SERPL-SCNC: 24 MMOL/L (ref 20–33)
CREAT SERPL-MCNC: 0.85 MG/DL (ref 0.5–1.4)
EOSINOPHIL # BLD AUTO: 0 K/UL (ref 0–0.51)
EOSINOPHIL NFR BLD: 0 % (ref 0–6.9)
ERYTHROCYTE [DISTWIDTH] IN BLOOD BY AUTOMATED COUNT: 45.5 FL (ref 35.9–50)
GLUCOSE SERPL-MCNC: 135 MG/DL (ref 65–99)
HCT VFR BLD AUTO: 34.6 % (ref 42–52)
HGB BLD-MCNC: 11.4 G/DL (ref 14–18)
IMM GRANULOCYTES # BLD AUTO: 0.03 K/UL (ref 0–0.11)
IMM GRANULOCYTES NFR BLD AUTO: 0.3 % (ref 0–0.9)
LACTATE BLD-SCNC: 1.7 MMOL/L (ref 0.5–2)
LYMPHOCYTES # BLD AUTO: 1.25 K/UL (ref 1–4.8)
LYMPHOCYTES NFR BLD: 12 % (ref 22–41)
MAGNESIUM SERPL-MCNC: 2 MG/DL (ref 1.5–2.5)
MCH RBC QN AUTO: 29.9 PG (ref 27–33)
MCHC RBC AUTO-ENTMCNC: 32.9 G/DL (ref 33.7–35.3)
MCV RBC AUTO: 90.8 FL (ref 81.4–97.8)
MONOCYTES # BLD AUTO: 1.62 K/UL (ref 0–0.85)
MONOCYTES NFR BLD AUTO: 15.6 % (ref 0–13.4)
NEUTROPHILS # BLD AUTO: 7.46 K/UL (ref 1.82–7.42)
NEUTROPHILS NFR BLD: 71.9 % (ref 44–72)
NRBC # BLD AUTO: 0 K/UL
NRBC BLD-RTO: 0 /100 WBC
PLATELET # BLD AUTO: 247 K/UL (ref 164–446)
PMV BLD AUTO: 9.8 FL (ref 9–12.9)
POTASSIUM SERPL-SCNC: 4.5 MMOL/L (ref 3.6–5.5)
RBC # BLD AUTO: 3.81 M/UL (ref 4.7–6.1)
SODIUM SERPL-SCNC: 138 MMOL/L (ref 135–145)
VANCOMYCIN SERPL-MCNC: 17 UG/ML
WBC # BLD AUTO: 10.4 K/UL (ref 4.8–10.8)

## 2019-09-12 PROCEDURE — 700102 HCHG RX REV CODE 250 W/ 637 OVERRIDE(OP): Performed by: HOSPITALIST

## 2019-09-12 PROCEDURE — 700105 HCHG RX REV CODE 258: Performed by: INTERNAL MEDICINE

## 2019-09-12 PROCEDURE — 700111 HCHG RX REV CODE 636 W/ 250 OVERRIDE (IP): Performed by: INTERNAL MEDICINE

## 2019-09-12 PROCEDURE — 85025 COMPLETE CBC W/AUTO DIFF WBC: CPT

## 2019-09-12 PROCEDURE — 83735 ASSAY OF MAGNESIUM: CPT

## 2019-09-12 PROCEDURE — A9270 NON-COVERED ITEM OR SERVICE: HCPCS | Performed by: HOSPITALIST

## 2019-09-12 PROCEDURE — 700111 HCHG RX REV CODE 636 W/ 250 OVERRIDE (IP): Performed by: HOSPITALIST

## 2019-09-12 PROCEDURE — 80202 ASSAY OF VANCOMYCIN: CPT

## 2019-09-12 PROCEDURE — 700105 HCHG RX REV CODE 258: Performed by: HOSPITALIST

## 2019-09-12 PROCEDURE — 770001 HCHG ROOM/CARE - MED/SURG/GYN PRIV*

## 2019-09-12 PROCEDURE — 36415 COLL VENOUS BLD VENIPUNCTURE: CPT

## 2019-09-12 PROCEDURE — 99233 SBSQ HOSP IP/OBS HIGH 50: CPT | Performed by: INTERNAL MEDICINE

## 2019-09-12 PROCEDURE — 83605 ASSAY OF LACTIC ACID: CPT

## 2019-09-12 PROCEDURE — 80048 BASIC METABOLIC PNL TOTAL CA: CPT

## 2019-09-12 RX ORDER — SODIUM CHLORIDE 9 MG/ML
INJECTION, SOLUTION INTRAVENOUS CONTINUOUS
Status: DISPENSED | OUTPATIENT
Start: 2019-09-12 | End: 2019-09-12

## 2019-09-12 RX ADMIN — OXYCODONE HYDROCHLORIDE 10 MG: 10 TABLET ORAL at 21:01

## 2019-09-12 RX ADMIN — SODIUM CHLORIDE: 9 INJECTION, SOLUTION INTRAVENOUS at 15:07

## 2019-09-12 RX ADMIN — SODIUM CHLORIDE: 9 INJECTION, SOLUTION INTRAVENOUS at 04:55

## 2019-09-12 RX ADMIN — MORPHINE SULFATE 4 MG: 4 INJECTION INTRAVENOUS at 13:06

## 2019-09-12 RX ADMIN — VANCOMYCIN HYDROCHLORIDE 1900 MG: 500 INJECTION, POWDER, LYOPHILIZED, FOR SOLUTION INTRAVENOUS at 18:00

## 2019-09-12 RX ADMIN — SODIUM CHLORIDE: 9 INJECTION, SOLUTION INTRAVENOUS at 17:59

## 2019-09-12 RX ADMIN — SODIUM CHLORIDE: 9 INJECTION, SOLUTION INTRAVENOUS at 10:58

## 2019-09-12 RX ADMIN — VANCOMYCIN HYDROCHLORIDE 1700 MG: 500 INJECTION, POWDER, LYOPHILIZED, FOR SOLUTION INTRAVENOUS at 18:14

## 2019-09-12 RX ADMIN — OXYCODONE HYDROCHLORIDE 10 MG: 10 TABLET ORAL at 07:48

## 2019-09-12 RX ADMIN — OXYCODONE HYDROCHLORIDE 10 MG: 10 TABLET ORAL at 11:00

## 2019-09-12 RX ADMIN — VANCOMYCIN HYDROCHLORIDE 1900 MG: 500 INJECTION, POWDER, LYOPHILIZED, FOR SOLUTION INTRAVENOUS at 00:52

## 2019-09-12 RX ADMIN — OXYCODONE HYDROCHLORIDE 10 MG: 10 TABLET ORAL at 17:59

## 2019-09-12 RX ADMIN — SENNOSIDES, DOCUSATE SODIUM 2 TABLET: 50; 8.6 TABLET, FILM COATED ORAL at 04:53

## 2019-09-12 RX ADMIN — OXYCODONE HYDROCHLORIDE 10 MG: 10 TABLET ORAL at 04:53

## 2019-09-12 RX ADMIN — OXYCODONE HYDROCHLORIDE 10 MG: 10 TABLET ORAL at 00:51

## 2019-09-12 RX ADMIN — MORPHINE SULFATE 4 MG: 4 INJECTION INTRAVENOUS at 22:01

## 2019-09-12 ASSESSMENT — ENCOUNTER SYMPTOMS
NAUSEA: 0
PALPITATIONS: 0
FOCAL WEAKNESS: 0
ABDOMINAL PAIN: 0
DIZZINESS: 0
DIARRHEA: 0
VOMITING: 0
CONSTIPATION: 0
FEVER: 0
SHORTNESS OF BREATH: 0
CHILLS: 0

## 2019-09-12 NOTE — DOCUMENTATION QUERY
[facility]                                                                       Query Response Note      PATIENT:               URBAN GALVAN  ACCT #:                  [id number]  MRN:                     [id number]  :                      1965  ADMIT DATE:       9/10/2019 10:02 PM  DISCH DATE:          RESPONDING  PROVIDER #:        [id number]           QUERY TEXT:    Sepsis is documented in the Medical Record as new starting on 19.  Please clarify whether:    NOTE:  If an appropriate response is not listed below, please respond with a new note.    The patient's Clinical Indicators include:  ?   Sepsis (new).  Without organ dysfunction.  Source is left infected knee.  Was tachycardic with elevated lactic        acidosis per progress notes starting on 19.  The diagnosis of sepsis is not found in the H&P or the ED        provider note      Admit vitals: /95, HR 78, T 97.9, RR 16      Labs:  WBC 9.8 - 10.4, lactic acid 1.8 on 9/10, lactic acid 1.8 - 3.0 on   ?  Treatments: IVF, tend lactic acid, antibiotics, cultures, surgical intervention  ?  Risk factors: pyogenic arthritis, acidosis  Options provided:   -- Sepsis present on admission   -- Sepsis developed after admission   -- Sepsis has been ruled out   -- Unable to determine      Query created by: Kelsey Holder on 2019 10:27 AM    RESPONSE TEXT:    Sepsis developed after admission          Electronically signed by:  Liliya Terrell 2019 11:19 AM

## 2019-09-12 NOTE — PROGRESS NOTES
Pharmacy Kinetics 53 y.o. male on vancomycin day # 2  2019    Currently on Vancomycin 1900 mg iv q12hr (1400, 0200)  Provider specified end date: TBD    Indication for Treatment: SSTI; septic arthiris    Pertinent history per medical record: Admitted on 9/10/2019 for an orthopedic surgery consultation regarding a septic L knee. Approximately 3 days prior to admission he developed pain and swelling to his L knee with no acute injury. Pt has also never had knee surgery. At [facility] they did an arthrocentesis that had an elevated white count and GPC on the Gram stain. He was taken to the OR for I&D with drain placement on . Empiric vancomycin is being started     Other antibiotics: None     Allergies: Patient has no known allergies.      Concerns for renal function: BMI >30     Pertinent cultures to date:   9/10/19: Blood x2: NGTD  9/10/19: Wound: NGTD    MRSA nares swab if pneumonia is a concern (ordered/positive/negative/n-a): N/A    Recent Labs     19  0346 19  0016   WBC 9.8 10.4   NEUTSPOLYS 88.80* 71.90     Recent Labs     19  0346 19  0016   BUN 20 17   CREATININE 0.92 0.85   ALBUMIN 3.7  --      Recent Labs     19  1345   VANCORANDOM 17.0       Intake/Output Summary (Last 24 hours) at 2019 1610  Last data filed at 2019 0900  Gross per 24 hour   Intake 820 ml   Output 1350 ml   Net -530 ml      /60   Pulse 70   Temp 36.4 °C (97.6 °F) (Temporal)   Resp 18   Ht 1.829 m (6')   Wt 111.1 kg (245 lb)   SpO2 91%  Temp (24hrs), Av.9 °C (98.4 °F), Min:36.4 °C (97.6 °F), Max:37.2 °C (99 °F)      A/P   1. Vancomycin dose change: 1700mg IV q12h  (0500, 1700)  2. Next vancomycin level: ~2-3 days  (not ordered)  3. Goal trough: 12-16 mcg/mL  4. Comments: ID has been consulted per physician notes. Cultures have no growth after 24hrs. No leukocytosis and afebrile. Renal function appears stable per renal indices. Trough level ~12hrs is supratherapeutic. Reduce  dose to ~15mg/kg per protocol. Recommend a repeat trough in a few days to assess accumulation and dose appropriateness. Pharmacy to monitor cultures for possible de-escalation.        Aida Hanley, PharmD., BCPS

## 2019-09-12 NOTE — PROGRESS NOTES
HOSPITAL MEDICINE PROGRESS NOTE    Date of service  9/12/2019    Chief Complaint  Leg Pain      Hospital Course:    52 yo man p/w left septic knee.  This was I&D on 9/10 by Dr. Coon.  Drain was removed 9/11.  Culture of the knee fluid pending.        Interval History Updates:  Hospital Day #Hospital Day: 3   No event overnight.  Afebrile.    Drain removed this morning.  Lactic acidosis resolved  Sepsis improving    Consultants/Specialty  Orthopaedics    Review of Systems   Review of Systems   Constitutional: Negative for chills and fever.   Respiratory: Negative for shortness of breath.    Cardiovascular: Negative for chest pain, palpitations and leg swelling.   Gastrointestinal: Negative for abdominal pain, constipation, diarrhea, nausea and vomiting.   Musculoskeletal: Positive for joint pain.        Left knee   Skin: Negative for rash.   Neurological: Negative for focal weakness.   Endo/Heme/Allergies: Negative.    All other systems reviewed and are negative.       Medications:  • vancomycin  17 mg/kg Q12HR   • insulin regular  2-10 Units 4X/DAY ACHS   • glucose  16 g Q15 MIN PRN    And   • dextrose 10% bolus  250 mL Q15 MIN PRN   • NS   Continuous   • MD Alert...Vancomycin per Pharmacy   PHARMACY TO DOSE   • senna-docusate  2 Tab BID    And   • polyethylene glycol/lytes  1 Packet QDAY PRN    And   • magnesium hydroxide  30 mL QDAY PRN    And   • bisacodyl  10 mg QDAY PRN   • Pharmacy Consult Request  1 Each PHARMACY TO DOSE    And   • oxyCODONE immediate-release  5 mg Q3HRS PRN    And   • oxyCODONE immediate-release  10 mg Q3HRS PRN    And   • morphine injection  4 mg Q3HRS PRN   • ondansetron  4 mg Q4HRS PRN   • ondansetron  4 mg Q4HRS PRN   • promethazine  12.5-25 mg Q4HRS PRN   • promethazine  12.5-25 mg Q4HRS PRN   • prochlorperazine  5-10 mg Q4HRS PRN       Physical Exam   Vitals:    09/11/19 1200 09/11/19 1600 09/11/19 1941 09/12/19 0413   BP: 124/79 111/73 123/65 110/65   Pulse: 85 66 79 65   Resp: 17  16 18 18   Temp: 36.9 °C (98.5 °F) 37.2 °C (99 °F) 37.1 °C (98.7 °F) 37.2 °C (99 °F)   TempSrc: Temporal Temporal Temporal Temporal   SpO2: 93% 95% 95% 96%   Weight:       Height:           Physical Exam   Constitutional: He is oriented to person, place, and time and well-developed, well-nourished, and in no distress. No distress.   HENT:   Head: Normocephalic and atraumatic.   Eyes: Pupils are equal, round, and reactive to light. Conjunctivae are normal. No scleral icterus.   Neck: Normal range of motion. Neck supple. No JVD present.   Cardiovascular: Normal rate, regular rhythm and normal heart sounds. Exam reveals no gallop and no friction rub.   No murmur heard.  Pulmonary/Chest: Effort normal and breath sounds normal. No respiratory distress. He has no wheezes. He has no rales. He exhibits no tenderness.   Abdominal: Soft. Bowel sounds are normal. There is no tenderness. There is no rebound.   Musculoskeletal: He exhibits tenderness. He exhibits no edema or deformity.   Left knee incision C/D/I.  Staples intact.  Drain removed.  Mild degree of erythema surrounding the left knee.  No drainage.   Neurological: He is alert and oriented to person, place, and time.   Skin: Skin is warm and dry. No rash noted. He is not diaphoretic. No erythema.   Psychiatric: Mood and affect normal.   Nursing note and vitals reviewed.       Recent Labs     09/11/19 0346 09/12/19 0016   WBC 9.8 10.4   RBC 4.48* 3.81*   HEMOGLOBIN 13.6* 11.4*   HEMATOCRIT 41.1* 34.6*   MCV 91.7 90.8   MCH 30.4 29.9   MCHC 33.1* 32.9*   RDW 46.3 45.5   PLATELETCT 253 247   MPV 10.1 9.8      Laboratory   Recent Labs     09/11/19 0346 09/12/19  0016   WBC 9.8 10.4   RBC 4.48* 3.81*   HEMOGLOBIN 13.6* 11.4*   HEMATOCRIT 41.1* 34.6*   PLATELETCT 253 247     Recent Labs     09/11/19 0346 09/12/19  0016   SODIUM 135 138   POTASSIUM 4.5 4.5   CHLORIDE 105 105   CO2 21 24   GLUCOSE 240* 135*   BUN 20 17   CREATININE 0.92 0.85      Recent Labs      09/11/19  0346 09/12/19  0016   ALTSGPT 13  --    ASTSGOT 17  --    ALKPHOSPHAT 63  --    TBILIRUBIN 1.1  --    GLUCOSE 240* 135*      Recent Labs     09/10/19  2225   INR 1.13      Lactic 2.1, 1.9, 3.4, 2.4, 3.0, 1.8, 1.7  ESR 64  CRP 20       No results found for: BLOODCULTU, BLDCULT, BCHOLD   Gram stain of knee fluid:  Rare gram+ cocci  ID/SENS pending     Labs reviewed by me and noted above.    Radiology  No image results found.       No results found for this or any previous visit.       Assessment and Plan    Principal Problem:    Arthritis, septic (HCC) POA: Yes.  S/p I&D 9/10.  Culture of fluid pending.  GS gram positive cocci.  No obvious source per HPI and speaking to patient.  Cont vancomycin empirically pending culture ID/SENS.  Drain out today per Ortho.  Discussed with Orthopaedic Surgery.  Long-term abx choice will depend on culture ID/SENS.  Suspect will need IV abx for a few weeks.        Sepsis (new).  Improving with source control of knee infection, IVF, abx.  Without organ dysfunction.  Source is left infected knee.  Was tachycardic with elevated lactic acidosis.   Lactic acidosis resolving.  Cont antibiotics as above.  Cont IVF for now.  Monitor for fever, leukocytosis, wound complications.      Lactic acidosis POA: Yes.  Resolving with aggressive IVF resuscitation.  Most likely due to septic arthitic.  Hemodynamics stable.  Cont IVF for now.  Treatment of underlying cause as above.      Acute hyperglycemia POA: Yes.  No h/o DM2.  Suspect due to septic knee/infection/sepsis physiology.  FSG trending down.  Cont ISS.  A1C is 6.0.      HLD (hyperlipidemia) POA: Yes.  Medication needs reconciliation still.      DVT prophylaxis: SCD    CODE STATUS:  FULL CODE    Disposition: Anticipate snf [facility] in 3-4 days.    Case was discussed with Primary RN and Charge Nurse, Medical SW, , and Pharmacist on IDTround in addition to individual(s) mentioned above.    I have performed a physical  exam and reviewed and updated ROS as of today, 9/12/2019.  In review of yesterday's note dated, 9/11/2019, there are no changes except as documented above.      Liliya Terrell M.D.

## 2019-09-12 NOTE — PROGRESS NOTES
Orthopaedic Progress Note    Author: Mai Abdias Date & Time created: 9/11/2019   9:47 AM     Interval Events:  POD # 2 I&D knee, septic knee.   Pain controlled. Complains of stiffness.   Worked with PT yesterday and did well.   Wants to shower today.  Tolerating antibiotics.   Voiding.   Tolerating diet.     Review of Systems   Constitutional: Negative for chills and fever.   Respiratory: Negative for shortness of breath.    Cardiovascular: Negative for chest pain.   Gastrointestinal: Negative for abdominal pain, nausea and vomiting.   Neurological: Negative for dizziness.     Hemodynamics:  /65   Pulse 65   Temp 37.2 °C (99 °F) (Temporal)   Resp 18   Ht 1.829 m (6')   Wt 111.1 kg (245 lb)   SpO2 96%      Current Precaution Orders   Procedures   • WEIGHT BEARING STATUS     Standing Status:   Standing     Number of Occurrences:   1     Order Specific Question:   What is the patients weight bearing status?     Answer:   WEIGHT BEARING AS TOLERATED   • WEIGHT BEARING STATUS     Standing Status:   Standing     Number of Occurrences:   1     Order Specific Question:   What is the patients weight bearing status?     Answer:   WEIGHT BEARING AS TOLERATED     Respiratory:    Respiration: 18, Pulse Oximetry: 96 %           Fluids:    Intake/Output Summary (Last 24 hours) at 9/11/2019 0947  Last data filed at 9/11/2019 0800  Gross per 24 hour   Intake 970 ml   Output 820 ml   Net 150 ml     Admit Weight: 111.1 kg (245 lb)  Current      Physical Exam   Constitutional: He is oriented to person, place, and time. He appears well-developed and well-nourished. No distress.   Cardiovascular: Intact distal pulses.   Pulmonary/Chest: Effort normal. No respiratory distress.   Musculoskeletal:   DNVI. PF/DF intact. No calf ttp redness, heat.    Neurological: He is alert and oriented to person, place, and time.   Skin: Skin is warm and dry. He is not diaphoretic.   Min drainage from dressing. Hemovac in place. Otherwise  c/d/i, approximated. No streaking noted. Mild effusion.      Labs:  Recent Labs     09/11/19  0346 09/12/19  0016   WBC 9.8 10.4   RBC 4.48* 3.81*   HEMOGLOBIN 13.6* 11.4*   HEMATOCRIT 41.1* 34.6*   MCV 91.7 90.8   MCH 30.4 29.9   MCHC 33.1* 32.9*   RDW 46.3 45.5   PLATELETCT 253 247   MPV 10.1 9.8     Recent Labs     09/11/19  0346 09/12/19  0016   SODIUM 135 138   POTASSIUM 4.5 4.5   CHLORIDE 105 105   CO2 21 24   GLUCOSE 240* 135*   BUN 20 17   CREATININE 0.92 0.85   CALCIUM 8.8 8.7       Medical Decision Making/Problem List:    Active Hospital Problems    Diagnosis   • *Arthritis, septic (HCC) [M00.9]   • Obesity [E66.9]   • HLD (hyperlipidemia) [E78.5]     Core Measures & Quality Metrics:  Current DVT prophylaxis: WBAT. AMbulation. SCDs.  Discussed patient condition with Patient.  Clearance for lovenox/heparin: ok.   Weight Bearing Status: WBAT. ROM as tolerated.   Wounds & Drains: Keep incision clean and dry.Per Dr. Coon d/c hvac drain today. Redress incision. Ok to shower, keep dry.   Disposition and Follow-up: PT/OT. WBAT. Monitor incision.   Antibiotics per cultures and Infectious Disease.

## 2019-09-12 NOTE — CARE PLAN
Problem: Communication  Goal: The ability to communicate needs accurately and effectively will improve  Outcome: PROGRESSING AS EXPECTED  Note:   Pt is a/ox4 and can express needs, concerns, and pain/comfort level to staff when prompted and prn.  Pt demonstrated proper use of call bell and agrees to call for assistance when needed.     Problem: Safety  Goal: Will remain free from injury  Outcome: PROGRESSING AS EXPECTED  Note:   Frequent rounding done to ensure safety.  Restraints checked during rounds and CMS assessed, along with checks for skin breakdown.  Room well lit and clutter free, call bell within reach.  Goal: Will remain free from falls  Outcome: PROGRESSING AS EXPECTED  Note:   Fall precautions in place with exception to bed alarm, pt refusing.  Pt does have two correctional officers at bedside to ensure safety and reduce chance of falling out of bed.     Problem: Knowledge Deficit  Goal: Knowledge of disease process/condition, treatment plan, diagnostic tests, and medications will improve  Outcome: PROGRESSING AS EXPECTED

## 2019-09-13 LAB
ANION GAP SERPL CALC-SCNC: 8 MMOL/L (ref 0–11.9)
BASOPHILS # BLD AUTO: 0.5 % (ref 0–1.8)
BASOPHILS # BLD: 0.04 K/UL (ref 0–0.12)
BUN SERPL-MCNC: 14 MG/DL (ref 8–22)
CALCIUM SERPL-MCNC: 8.8 MG/DL (ref 8.5–10.5)
CHLORIDE SERPL-SCNC: 101 MMOL/L (ref 96–112)
CO2 SERPL-SCNC: 28 MMOL/L (ref 20–33)
CREAT SERPL-MCNC: 0.88 MG/DL (ref 0.5–1.4)
EOSINOPHIL # BLD AUTO: 0.08 K/UL (ref 0–0.51)
EOSINOPHIL NFR BLD: 1.1 % (ref 0–6.9)
ERYTHROCYTE [DISTWIDTH] IN BLOOD BY AUTOMATED COUNT: 46.1 FL (ref 35.9–50)
GLUCOSE SERPL-MCNC: 98 MG/DL (ref 65–99)
HCT VFR BLD AUTO: 37.2 % (ref 42–52)
HGB BLD-MCNC: 11.9 G/DL (ref 14–18)
IMM GRANULOCYTES # BLD AUTO: 0.04 K/UL (ref 0–0.11)
IMM GRANULOCYTES NFR BLD AUTO: 0.5 % (ref 0–0.9)
LYMPHOCYTES # BLD AUTO: 2.41 K/UL (ref 1–4.8)
LYMPHOCYTES NFR BLD: 33 % (ref 22–41)
MCH RBC QN AUTO: 29.5 PG (ref 27–33)
MCHC RBC AUTO-ENTMCNC: 32 G/DL (ref 33.7–35.3)
MCV RBC AUTO: 92.1 FL (ref 81.4–97.8)
MONOCYTES # BLD AUTO: 1.06 K/UL (ref 0–0.85)
MONOCYTES NFR BLD AUTO: 14.5 % (ref 0–13.4)
NEUTROPHILS # BLD AUTO: 3.67 K/UL (ref 1.82–7.42)
NEUTROPHILS NFR BLD: 50.4 % (ref 44–72)
NRBC # BLD AUTO: 0 K/UL
NRBC BLD-RTO: 0 /100 WBC
PLATELET # BLD AUTO: 276 K/UL (ref 164–446)
PMV BLD AUTO: 10.5 FL (ref 9–12.9)
POTASSIUM SERPL-SCNC: 3.8 MMOL/L (ref 3.6–5.5)
RBC # BLD AUTO: 4.04 M/UL (ref 4.7–6.1)
SODIUM SERPL-SCNC: 137 MMOL/L (ref 135–145)
VANCOMYCIN SERPL-MCNC: 17.5 UG/ML
WBC # BLD AUTO: 7.3 K/UL (ref 4.8–10.8)

## 2019-09-13 PROCEDURE — 80048 BASIC METABOLIC PNL TOTAL CA: CPT

## 2019-09-13 PROCEDURE — 36415 COLL VENOUS BLD VENIPUNCTURE: CPT

## 2019-09-13 PROCEDURE — 700102 HCHG RX REV CODE 250 W/ 637 OVERRIDE(OP): Performed by: HOSPITALIST

## 2019-09-13 PROCEDURE — A9270 NON-COVERED ITEM OR SERVICE: HCPCS | Performed by: HOSPITALIST

## 2019-09-13 PROCEDURE — 700105 HCHG RX REV CODE 258: Performed by: INTERNAL MEDICINE

## 2019-09-13 PROCEDURE — 700111 HCHG RX REV CODE 636 W/ 250 OVERRIDE (IP): Performed by: HOSPITALIST

## 2019-09-13 PROCEDURE — 99233 SBSQ HOSP IP/OBS HIGH 50: CPT | Performed by: INTERNAL MEDICINE

## 2019-09-13 PROCEDURE — 85025 COMPLETE CBC W/AUTO DIFF WBC: CPT

## 2019-09-13 PROCEDURE — 700111 HCHG RX REV CODE 636 W/ 250 OVERRIDE (IP): Performed by: INTERNAL MEDICINE

## 2019-09-13 PROCEDURE — 700102 HCHG RX REV CODE 250 W/ 637 OVERRIDE(OP): Performed by: INTERNAL MEDICINE

## 2019-09-13 PROCEDURE — A9270 NON-COVERED ITEM OR SERVICE: HCPCS | Performed by: INTERNAL MEDICINE

## 2019-09-13 PROCEDURE — 770001 HCHG ROOM/CARE - MED/SURG/GYN PRIV*

## 2019-09-13 RX ORDER — ATORVASTATIN CALCIUM 20 MG/1
20 TABLET, FILM COATED ORAL EVERY EVENING
Status: DISCONTINUED | OUTPATIENT
Start: 2019-09-13 | End: 2019-09-25 | Stop reason: HOSPADM

## 2019-09-13 RX ADMIN — OXYCODONE HYDROCHLORIDE 10 MG: 10 TABLET ORAL at 20:07

## 2019-09-13 RX ADMIN — OXYCODONE HYDROCHLORIDE 10 MG: 10 TABLET ORAL at 22:41

## 2019-09-13 RX ADMIN — MORPHINE SULFATE 4 MG: 4 INJECTION INTRAVENOUS at 02:53

## 2019-09-13 RX ADMIN — OXYCODONE HYDROCHLORIDE 10 MG: 10 TABLET ORAL at 04:51

## 2019-09-13 RX ADMIN — VANCOMYCIN HYDROCHLORIDE 1700 MG: 500 INJECTION, POWDER, LYOPHILIZED, FOR SOLUTION INTRAVENOUS at 04:51

## 2019-09-13 RX ADMIN — VANCOMYCIN HYDROCHLORIDE 1700 MG: 500 INJECTION, POWDER, LYOPHILIZED, FOR SOLUTION INTRAVENOUS at 18:15

## 2019-09-13 RX ADMIN — ATORVASTATIN CALCIUM 20 MG: 20 TABLET, FILM COATED ORAL at 18:15

## 2019-09-13 RX ADMIN — MORPHINE SULFATE 4 MG: 4 INJECTION INTRAVENOUS at 05:56

## 2019-09-13 RX ADMIN — OXYCODONE HYDROCHLORIDE 10 MG: 10 TABLET ORAL at 00:46

## 2019-09-13 RX ADMIN — OXYCODONE HYDROCHLORIDE 10 MG: 10 TABLET ORAL at 16:15

## 2019-09-13 RX ADMIN — SENNOSIDES, DOCUSATE SODIUM 2 TABLET: 50; 8.6 TABLET, FILM COATED ORAL at 18:15

## 2019-09-13 RX ADMIN — SENNOSIDES, DOCUSATE SODIUM 2 TABLET: 50; 8.6 TABLET, FILM COATED ORAL at 04:51

## 2019-09-13 RX ADMIN — OXYCODONE HYDROCHLORIDE 10 MG: 10 TABLET ORAL at 08:48

## 2019-09-13 RX ADMIN — OXYCODONE HYDROCHLORIDE 10 MG: 10 TABLET ORAL at 12:10

## 2019-09-13 ASSESSMENT — ENCOUNTER SYMPTOMS
DIARRHEA: 0
CHILLS: 0
FOCAL WEAKNESS: 0
FEVER: 0
NAUSEA: 0
ABDOMINAL PAIN: 0
DIZZINESS: 0
CONSTIPATION: 0
PALPITATIONS: 0
SHORTNESS OF BREATH: 0
VOMITING: 0

## 2019-09-13 NOTE — PROGRESS NOTES
HOSPITAL MEDICINE PROGRESS NOTE    Date of service  9/13/2019    Chief Complaint  Leg Pain      Hospital Course:    52 yo man p/w left septic knee.  This was I&D on 9/10 by Dr. Coon.  Drain was removed 9/11.  Culture of the knee fluid pending.        Interval History Updates:  Hospital Day #Hospital Day: 3   No event overnight.  Afebrile.    Drain removed this morning.  Lactic acidosis resolved  Sepsis improving    Consultants/Specialty  Orthopaedics    Review of Systems   Review of Systems   Constitutional: Negative for chills and fever.   Respiratory: Negative for shortness of breath.    Cardiovascular: Negative for chest pain, palpitations and leg swelling.   Gastrointestinal: Negative for abdominal pain, constipation, diarrhea, nausea and vomiting.   Musculoskeletal: Positive for joint pain.        Left knee   Skin: Negative for rash.   Neurological: Negative for focal weakness.   Endo/Heme/Allergies: Negative.    All other systems reviewed and are negative.       Medications:  • atorvastatin  20 mg Q EVENING   • vancomycin  15 mg/kg Q12HR   • MD Alert...Vancomycin per Pharmacy   PHARMACY TO DOSE   • senna-docusate  2 Tab BID    And   • polyethylene glycol/lytes  1 Packet QDAY PRN    And   • magnesium hydroxide  30 mL QDAY PRN    And   • bisacodyl  10 mg QDAY PRN   • Pharmacy Consult Request  1 Each PHARMACY TO DOSE    And   • oxyCODONE immediate-release  5 mg Q3HRS PRN    And   • oxyCODONE immediate-release  10 mg Q3HRS PRN    And   • morphine injection  4 mg Q3HRS PRN   • ondansetron  4 mg Q4HRS PRN   • ondansetron  4 mg Q4HRS PRN   • promethazine  12.5-25 mg Q4HRS PRN   • promethazine  12.5-25 mg Q4HRS PRN   • prochlorperazine  5-10 mg Q4HRS PRN       Physical Exam   Vitals:    09/12/19 1700 09/12/19 2100 09/13/19 0500 09/13/19 0800   BP: 123/80 119/70 132/90 125/76   Pulse: 63 70 64 80   Resp: 20 17 18 17   Temp: 36.6 °C (97.9 °F) 37.2 °C (98.9 °F) 36.9 °C (98.5 °F) 37.2 °C (98.9 °F)   TempSrc: Temporal  Temporal Temporal Temporal   SpO2: 97% 98% 94% 97%   Weight:       Height:           Physical Exam   Constitutional: He is oriented to person, place, and time and well-developed, well-nourished, and in no distress. No distress.   HENT:   Head: Normocephalic and atraumatic.   Eyes: Pupils are equal, round, and reactive to light. Conjunctivae are normal. No scleral icterus.   Neck: Normal range of motion. Neck supple. No JVD present.   Cardiovascular: Normal rate, regular rhythm and normal heart sounds. Exam reveals no gallop and no friction rub.   No murmur heard.  Pulmonary/Chest: Effort normal and breath sounds normal. No respiratory distress. He has no wheezes. He has no rales. He exhibits no tenderness.   Abdominal: Soft. Bowel sounds are normal. There is no tenderness. There is no rebound.   Musculoskeletal: He exhibits tenderness. He exhibits no edema or deformity.   Left knee incision C/D/I.  Staples intact.  Drain removed.  Mild degree of erythema surrounding the left knee.  No drainage.  Swelling better today.   Neurological: He is alert and oriented to person, place, and time.   Skin: Skin is warm and dry. No rash noted. He is not diaphoretic. No erythema.   Psychiatric: Mood and affect normal.   Nursing note and vitals reviewed.       Recent Labs     09/11/19 0346 09/12/19 0016 09/13/19  0545   WBC 9.8 10.4 7.3   RBC 4.48* 3.81* 4.04*   HEMOGLOBIN 13.6* 11.4* 11.9*   HEMATOCRIT 41.1* 34.6* 37.2*   MCV 91.7 90.8 92.1   MCH 30.4 29.9 29.5   MCHC 33.1* 32.9* 32.0*   RDW 46.3 45.5 46.1   PLATELETCT 253 247 276   MPV 10.1 9.8 10.5      Laboratory   Recent Labs     09/11/19 0346 09/12/19 0016 09/13/19  0545   WBC 9.8 10.4 7.3   RBC 4.48* 3.81* 4.04*   HEMOGLOBIN 13.6* 11.4* 11.9*   HEMATOCRIT 41.1* 34.6* 37.2*   PLATELETCT 253 247 276     Recent Labs     09/11/19 0346 09/12/19 0016 09/13/19  0545   SODIUM 135 138 137   POTASSIUM 4.5 4.5 3.8   CHLORIDE 105 105 101   CO2 21 24 28   GLUCOSE 240* 135* 98   BUN  20 17 14   CREATININE 0.92 0.85 0.88      Recent Labs     09/11/19  0346 09/12/19  0016 09/13/19  0545   ALTSGPT 13  --   --    ASTSGOT 17  --   --    ALKPHOSPHAT 63  --   --    TBILIRUBIN 1.1  --   --    GLUCOSE 240* 135* 98      Recent Labs     09/10/19  2225   INR 1.13      Lactic 2.1, 1.9, 3.4, 2.4, 3.0, 1.8, 1.7  ESR 64  CRP 20       No results found for: BLOODCULTU, BLDCULT, BCHOLD   Gram stain of knee fluid:  Rare gram+ cocci  ID/SENS pending  No growth after 48 hours.     Labs reviewed by me and noted above.    Radiology  No image results found.       No results found for this or any previous visit.       Assessment and Plan    Principal Problem:    Arthritis, septic (HCC) POA: Yes.  S/p I&D 9/10.  Culture of fluid pending; after 48 hrs no growth.  GS gram positive cocci.  No obvious source per HPI and speaking to patient.  Cont vancomycin empirically pending culture ID/SENS.  Drain out 9/12 per Ortho.  Long-term abx choice will depend on culture ID/SENS, but so far culture no growth after 2 days.        Sepsis (new).  Occur after admission, suspect due to infection and surgery releasing immune cytokines into blood stream.  Resolved with source control, IVF, abx.  Without organ dysfunction.  Was tachycardic with elevated lactic acidosis.   Lactic acidosis resolved.  Cont antibiotics as above.  Cont IVF for now.  Monitor for fever, leukocytosis, wound complications.      Lactic acidosis POA: Yes.  Resolving with aggressive IVF resuscitation.  Most likely due to septic arthitic.  Hemodynamics stable.  Cont IVF for now.  Treatment of underlying cause as above.      Acute hyperglycemia POA: Yes.  No h/o DM2.  Suspect due to septic knee/infection/sepsis physiology.  FSG trending down.  Patient decline monitor, so discontinued FSG monitor, ISS.  A1C is 6.0.      HLD (hyperlipidemia) POA: Yes.  Medication needs reconciliation still.      DVT prophylaxis: SCD    CODE STATUS:  FULL CODE    Disposition: Anticipate  [facility] in 2-3 days pending final cx ID/SENS.      Case was discussed with Primary RN and Charge Nurse, Medical SW, , and Pharmacist on IDTround in addition to individual(s) mentioned above.    I have performed a physical exam and reviewed and updated ROS as of today, 9/13/2019.  In review of yesterday's note dated, 9/12/2019, there are no changes except as documented above.      Liliya Terrell M.D.

## 2019-09-13 NOTE — PROGRESS NOTES
Orthopaedic Progress Note    Author: Mai Abdias Date & Time created: 9/11/2019   9:47 AM     Interval Events:  POD # 3 I&D knee, septic knee.   Pain controlled. Complains of stiffness.   Worked with PT, doing well.  C/o tightness related to dressing.   Tolerating antibiotics.   Voiding.   Tolerating diet.     Review of Systems   Constitutional: Negative for chills and fever.   Respiratory: Negative for shortness of breath.    Cardiovascular: Negative for chest pain.   Gastrointestinal: Negative for abdominal pain, nausea and vomiting.   Neurological: Negative for dizziness.     Hemodynamics:  /76   Pulse 80   Temp 37.2 °C (98.9 °F) (Temporal)   Resp 17   Ht 1.829 m (6')   Wt 111.1 kg (245 lb)   SpO2 97%      Current Precaution Orders   Procedures   • WEIGHT BEARING STATUS     Standing Status:   Standing     Number of Occurrences:   1     Order Specific Question:   What is the patients weight bearing status?     Answer:   WEIGHT BEARING AS TOLERATED   • WEIGHT BEARING STATUS     Standing Status:   Standing     Number of Occurrences:   1     Order Specific Question:   What is the patients weight bearing status?     Answer:   WEIGHT BEARING AS TOLERATED     Respiratory:    Respiration: 17, Pulse Oximetry: 97 %           Fluids:    Intake/Output Summary (Last 24 hours) at 9/11/2019 0947  Last data filed at 9/11/2019 0800  Gross per 24 hour   Intake 970 ml   Output 820 ml   Net 150 ml     Admit Weight: 111.1 kg (245 lb)  Current      Physical Exam   Constitutional: He is oriented to person, place, and time. He appears well-developed and well-nourished. No distress.   Cardiovascular: Intact distal pulses.   Pulmonary/Chest: Effort normal. No respiratory distress.   Musculoskeletal:   DNVI. PF/DF intact. No calf ttp redness, heat.    Neurological: He is alert and oriented to person, place, and time.   Skin: Skin is warm and dry. He is not diaphoretic.   Min drainage from dressing. Otherwise c/d/i,  approximated. No streaking noted. Mild effusion. DRain removed. Staples in place.      Labs:  Recent Labs     09/11/19  0346 09/12/19  0016 09/13/19  0545   WBC 9.8 10.4 7.3   RBC 4.48* 3.81* 4.04*   HEMOGLOBIN 13.6* 11.4* 11.9*   HEMATOCRIT 41.1* 34.6* 37.2*   MCV 91.7 90.8 92.1   MCH 30.4 29.9 29.5   MCHC 33.1* 32.9* 32.0*   RDW 46.3 45.5 46.1   PLATELETCT 253 247 276   MPV 10.1 9.8 10.5     Recent Labs     09/11/19  0346 09/12/19  0016 09/13/19  0545   SODIUM 135 138 137   POTASSIUM 4.5 4.5 3.8   CHLORIDE 105 105 101   CO2 21 24 28   GLUCOSE 240* 135* 98   BUN 20 17 14   CREATININE 0.92 0.85 0.88   CALCIUM 8.8 8.7 8.8       Medical Decision Making/Problem List:    Active Hospital Problems    Diagnosis   • *Arthritis, septic (HCC) [M00.9]   • Obesity [E66.9]   • HLD (hyperlipidemia) [E78.5]   Cultures: Many WBCs, no growth at 48 hours, Few gram + cocci.     Core Measures & Quality Metrics:  Current DVT prophylaxis: WBAT. AMbulation. SCDs.  Discussed patient condition with Patient.  Clearance for lovenox/heparin: ok.   Weight Bearing Status: WBAT. ROM as tolerated.   Wounds & Drains: Keep incision clean and dry .Redress incision. Ok to shower, keep dry.   Disposition and Follow-up: PT/OT. WBAT. Monitor incision.   Antibiotics per cultures and Infectious Disease/Hospitalist..

## 2019-09-13 NOTE — PROGRESS NOTES
Patient AOx4. VSS. Pain meds given per protocol. Patient ambulates and tolerates diet well. Patient removed wound vac and RN redressed wound/leg accordingly. IV antibiotics running.

## 2019-09-13 NOTE — PROGRESS NOTES
Received Vancomycin with STAT label from tube sytem. Vancomycin with different dosage currently running. Called pharmacy to verify further actions. Pharmacy informed this nurse to stop current bag and start new bag. Action completed.

## 2019-09-13 NOTE — PROGRESS NOTES
Pharmacy Kinetics 53 y.o. male on vancomycin day # 3      2019    Currently on Vancomycin 1700 mg iv q12hr (0500 1700)  Provider specified end date: TBD    Indication for Treatment: SSTI, Septic joint    Pertinent history per medical record: Admitted on 9/10/2019 for Septic Joint.  52 yo man w left septic knee.  This was I&D on 9/10 by Dr. Coon.  Drain was removed .  Culture of the knee fluid pending.        Other antibiotics: None    Allergies: Patient has no known allergies.     List concerns for renal function  obesity    Pertinent cultures to date:   09/10/19:PBCx2:NGTD  09/10/19:Lt Knee,WND:NGTD    MRSA nares swab if pneumonia is a concern (ordered/positive/negative/n-a): NA    Recent Labs     19  0346 19  0016 19  0545   WBC 9.8 10.4 7.3   NEUTSPOLYS 88.80* 71.90 50.40     Recent Labs     19  0346 19  0016 19  0545   BUN 20 17 14   CREATININE 0.92 0.85 0.88   ALBUMIN 3.7  --   --      Recent Labs     19  1345   VANCORANDOM 17.0       Intake/Output Summary (Last 24 hours) at 2019 0807  Last data filed at 2019 1900  Gross per 24 hour   Intake 320 ml   Output 500 ml   Net -180 ml      /90   Pulse 64   Temp 36.9 °C (98.5 °F) (Temporal)   Resp 18   Ht 1.829 m (6')   Wt 111.1 kg (245 lb)   SpO2 94%  Temp (24hrs), Av.8 °C (98.2 °F), Min:36.4 °C (97.6 °F), Max:37.2 °C (98.9 °F)      A/P   1. Vancomycin dose change: No change   2. Next vancomycin level: 19@0430  3. Goal trough: 12-16 mcg/mL   4. Comments: No leukocytosis, afebrile, cx NGTD. Pt @ risk to accumulate vancomycin , level reordered. Renal indices stable over interval. Continue current dose. Cx NGTD >48 hr , consider abx de escalation when clinically warranted.     Jermaine Valero PharmD BCPS

## 2019-09-14 LAB
ANION GAP SERPL CALC-SCNC: 7 MMOL/L (ref 0–11.9)
BASOPHILS # BLD AUTO: 0.5 % (ref 0–1.8)
BASOPHILS # BLD: 0.04 K/UL (ref 0–0.12)
BUN SERPL-MCNC: 15 MG/DL (ref 8–22)
CALCIUM SERPL-MCNC: 9.1 MG/DL (ref 8.5–10.5)
CHLORIDE SERPL-SCNC: 99 MMOL/L (ref 96–112)
CO2 SERPL-SCNC: 29 MMOL/L (ref 20–33)
CREAT SERPL-MCNC: 0.94 MG/DL (ref 0.5–1.4)
EOSINOPHIL # BLD AUTO: 0.19 K/UL (ref 0–0.51)
EOSINOPHIL NFR BLD: 2.3 % (ref 0–6.9)
ERYTHROCYTE [DISTWIDTH] IN BLOOD BY AUTOMATED COUNT: 45.1 FL (ref 35.9–50)
GLUCOSE SERPL-MCNC: 116 MG/DL (ref 65–99)
HCT VFR BLD AUTO: 39.8 % (ref 42–52)
HGB BLD-MCNC: 12.7 G/DL (ref 14–18)
IMM GRANULOCYTES # BLD AUTO: 0.07 K/UL (ref 0–0.11)
IMM GRANULOCYTES NFR BLD AUTO: 0.9 % (ref 0–0.9)
LYMPHOCYTES # BLD AUTO: 2.31 K/UL (ref 1–4.8)
LYMPHOCYTES NFR BLD: 28.1 % (ref 22–41)
MCH RBC QN AUTO: 29.3 PG (ref 27–33)
MCHC RBC AUTO-ENTMCNC: 31.9 G/DL (ref 33.7–35.3)
MCV RBC AUTO: 91.7 FL (ref 81.4–97.8)
MONOCYTES # BLD AUTO: 1.1 K/UL (ref 0–0.85)
MONOCYTES NFR BLD AUTO: 13.4 % (ref 0–13.4)
NEUTROPHILS # BLD AUTO: 4.51 K/UL (ref 1.82–7.42)
NEUTROPHILS NFR BLD: 54.8 % (ref 44–72)
NRBC # BLD AUTO: 0 K/UL
NRBC BLD-RTO: 0 /100 WBC
PLATELET # BLD AUTO: 295 K/UL (ref 164–446)
PMV BLD AUTO: 9.9 FL (ref 9–12.9)
POTASSIUM SERPL-SCNC: 4 MMOL/L (ref 3.6–5.5)
RBC # BLD AUTO: 4.34 M/UL (ref 4.7–6.1)
SODIUM SERPL-SCNC: 135 MMOL/L (ref 135–145)
VANCOMYCIN TROUGH SERPL-MCNC: 17.3 UG/ML (ref 10–20)
WBC # BLD AUTO: 8.2 K/UL (ref 4.8–10.8)

## 2019-09-14 PROCEDURE — 80202 ASSAY OF VANCOMYCIN: CPT

## 2019-09-14 PROCEDURE — 700105 HCHG RX REV CODE 258: Performed by: INTERNAL MEDICINE

## 2019-09-14 PROCEDURE — A9270 NON-COVERED ITEM OR SERVICE: HCPCS | Performed by: INTERNAL MEDICINE

## 2019-09-14 PROCEDURE — 700111 HCHG RX REV CODE 636 W/ 250 OVERRIDE (IP): Performed by: HOSPITALIST

## 2019-09-14 PROCEDURE — 700102 HCHG RX REV CODE 250 W/ 637 OVERRIDE(OP): Performed by: HOSPITALIST

## 2019-09-14 PROCEDURE — 770001 HCHG ROOM/CARE - MED/SURG/GYN PRIV*

## 2019-09-14 PROCEDURE — 700102 HCHG RX REV CODE 250 W/ 637 OVERRIDE(OP): Performed by: INTERNAL MEDICINE

## 2019-09-14 PROCEDURE — 36415 COLL VENOUS BLD VENIPUNCTURE: CPT

## 2019-09-14 PROCEDURE — A9270 NON-COVERED ITEM OR SERVICE: HCPCS | Performed by: HOSPITALIST

## 2019-09-14 PROCEDURE — 85025 COMPLETE CBC W/AUTO DIFF WBC: CPT

## 2019-09-14 PROCEDURE — 700111 HCHG RX REV CODE 636 W/ 250 OVERRIDE (IP): Performed by: INTERNAL MEDICINE

## 2019-09-14 PROCEDURE — 99233 SBSQ HOSP IP/OBS HIGH 50: CPT | Performed by: INTERNAL MEDICINE

## 2019-09-14 PROCEDURE — 99223 1ST HOSP IP/OBS HIGH 75: CPT | Performed by: INTERNAL MEDICINE

## 2019-09-14 PROCEDURE — 80048 BASIC METABOLIC PNL TOTAL CA: CPT

## 2019-09-14 RX ORDER — HYDROMORPHONE HYDROCHLORIDE 2 MG/1
2 TABLET ORAL EVERY 4 HOURS PRN
Status: DISCONTINUED | OUTPATIENT
Start: 2019-09-14 | End: 2019-09-22

## 2019-09-14 RX ADMIN — OXYCODONE HYDROCHLORIDE 10 MG: 10 TABLET ORAL at 10:03

## 2019-09-14 RX ADMIN — OXYCODONE HYDROCHLORIDE 10 MG: 10 TABLET ORAL at 04:52

## 2019-09-14 RX ADMIN — VANCOMYCIN HYDROCHLORIDE 1500 MG: 500 INJECTION, POWDER, LYOPHILIZED, FOR SOLUTION INTRAVENOUS at 20:51

## 2019-09-14 RX ADMIN — OXYCODONE HYDROCHLORIDE 10 MG: 10 TABLET ORAL at 20:49

## 2019-09-14 RX ADMIN — HYDROMORPHONE HYDROCHLORIDE 2 MG: 2 TABLET ORAL at 23:30

## 2019-09-14 RX ADMIN — VANCOMYCIN HYDROCHLORIDE 1500 MG: 500 INJECTION, POWDER, LYOPHILIZED, FOR SOLUTION INTRAVENOUS at 10:03

## 2019-09-14 RX ADMIN — ATORVASTATIN CALCIUM 20 MG: 20 TABLET, FILM COATED ORAL at 17:16

## 2019-09-14 RX ADMIN — HYDROMORPHONE HYDROCHLORIDE 2 MG: 2 TABLET ORAL at 16:21

## 2019-09-14 RX ADMIN — SENNOSIDES, DOCUSATE SODIUM 2 TABLET: 50; 8.6 TABLET, FILM COATED ORAL at 04:52

## 2019-09-14 RX ADMIN — OXYCODONE HYDROCHLORIDE 10 MG: 10 TABLET ORAL at 17:18

## 2019-09-14 RX ADMIN — MORPHINE SULFATE 4 MG: 4 INJECTION INTRAVENOUS at 00:14

## 2019-09-14 RX ADMIN — CEFTRIAXONE SODIUM 2 G: 2 INJECTION, POWDER, FOR SOLUTION INTRAMUSCULAR; INTRAVENOUS at 16:23

## 2019-09-14 ASSESSMENT — ENCOUNTER SYMPTOMS
COUGH: 0
CHILLS: 0
NERVOUS/ANXIOUS: 0
HEADACHES: 0
FOCAL WEAKNESS: 0
NAUSEA: 0
FEVER: 0
MYALGIAS: 1
PALPITATIONS: 0
FEVER: 1
CONSTIPATION: 0
ABDOMINAL PAIN: 0
VOMITING: 0
DIARRHEA: 0
SPUTUM PRODUCTION: 0
SHORTNESS OF BREATH: 0
BLURRED VISION: 0

## 2019-09-14 NOTE — PROGRESS NOTES
Pharmacy Kinetics 53 y.o. male on vancomycin day # 4   2019    Currently on Vancomycin 1700 mg iv q12hr (0500 1700)  Provider specified end date: TBD     Indication for Treatment: SSTI, Septic joint     Pertinent history per medical record: Admitted on 9/10/2019 for Septic Joint.  54 yo man w left septic knee.  This was I&D on 9/10 by Dr. Coon.  Drain was removed .  Culture of the knee fluid pending.         Other antibiotics: None     Allergies: Patient has no known allergies.      List concerns for renal function  obesity     Pertinent cultures to date:   09/10/19:PBCx2:NGTD  09/10/19:Lt Knee,WND:NGTD     MRSA nares swab if pneumonia is a concern (ordered/positive/negative/n-a): NA    Recent Labs     19  0016 19  0545 19  0421   WBC 10.4 7.3 8.2   NEUTSPOLYS 71.90 50.40 54.80     Recent Labs     19  0016 19  0545 19  0421   BUN 17 14 15   CREATININE 0.85 0.88 0.94     Recent Labs     19  0016 19  1345 19  0421   VANCOTROUGH  --   --  17.3   VANCORANDOM 17.5 17.0  --        Intake/Output Summary (Last 24 hours) at 2019 0813  Last data filed at 2019 2300  Gross per 24 hour   Intake 1700 ml   Output 1600 ml   Net 100 ml      /80   Pulse 84   Temp 36.9 °C (98.5 °F) (Temporal)   Resp 17   Ht 1.829 m (6')   Wt 111.1 kg (245 lb)   SpO2 95%  Temp (24hrs), Av.2 °C (99 °F), Min:36.9 °C (98.5 °F), Max:37.8 °C (100.1 °F)      A/P        1. Vancomycin dose change: Vancomycin 1500 mg iv q12hr (09 2100)  2. Next vancomycin level: 19@0830  3. Goal trough: 12-16 mcg/mL   4. Comments: No leukocytosis, cx NGTD. Tmax 37.8.Renal indices stable over interval, level above goal. Cx NGTD >48 hr , consider abx de escalation when clinically warranted.      Jermaine Valero PharmD BCPS

## 2019-09-14 NOTE — CONSULTS
"Consults   INFECTIOUS DISEASES INPATIENT CONSULT NOTE     Date of Service: 9/14/2019    Consult Requested By: Liliya Terrell M.D.    Reason for Consultation: Left septic knee     History of Present Illness:   Deanna Moe is a 53 y.o.  admitted 9/10/2019. Pt has no significant past medical history.  [facility] and states that approximately 1 month ago he \"bumped\" his knee.  Since that time he had slowly progressive edema, redness and pain.  Symptoms progressed until just prior to admit when he had severe pain edema and was unable to ambulate.  He was initially brought to an outside facility ([facility] in [place]) and per notes arthrocentesis revealed markedly elevated WBC count and initial Gram stain with GPC's.  Per notes the outside facility arthrocentesis with WBC neutral count of 112,000.     Hospital Course:   He has been afebrile but yesterday had low-grade temperature of 100.1.  No leukocytosis and ESR of 64 on 9/10.  He went to the OR on 9/10 for washout of the knee.  OR cultures were obtained.  He is complaining of increased swelling and pain in the leg today.       Review Of Systems:  Review of Systems   Constitutional: Positive for fever. Negative for chills and malaise/fatigue.   HENT: Negative for hearing loss.    Eyes: Negative for blurred vision.   Respiratory: Negative for cough, sputum production and shortness of breath.    Cardiovascular: Negative for chest pain and leg swelling.   Gastrointestinal: Negative for abdominal pain, constipation, diarrhea, nausea and vomiting.   Genitourinary: Negative for dysuria.   Musculoskeletal: Positive for joint pain and myalgias.   Skin: Negative for rash.   Neurological: Negative for headaches.   Psychiatric/Behavioral: The patient is not nervous/anxious.          PMH:   Past Medical History:   Diagnosis Date   • Hyperlipemia        PSH:  Past Surgical History:   Procedure Laterality Date   • IRRIGATION & DEBRIDEMENT ORTHO Left 9/10/2019    " Procedure: IRRIGATION AND DEBRIDEMENT, WOUND;  Surgeon: Brad Coon M.D.;  Location: SURGERY [facility];  Service: Orthopedics       FAMILY HX:  History reviewed. No pertinent family history.    SOCIAL HX:  Social History     Socioeconomic History   • Marital status: Single     Spouse name: Not on file   • Number of children: Not on file   • Years of education: Not on file   • Highest education level: Not on file   Occupational History   • Not on file   Social Needs   • Financial resource strain: Not on file   • Food insecurity:     Worry: Not on file     Inability: Not on file   • Transportation needs:     Medical: Not on file     Non-medical: Not on file   Tobacco Use   • Smoking status: Never Smoker   • Smokeless tobacco: Never Used   Substance and Sexual Activity   • Alcohol use: Not Currently   • Drug use: Not Currently   • Sexual activity: Not on file   Lifestyle   • Physical activity:     Days per week: Not on file     Minutes per session: Not on file   • Stress: Not on file   Relationships   • Social connections:     Talks on phone: Not on file     Gets together: Not on file     Attends [employer] service: Not on file     Active member of club or organization: Not on file     Attends meetings of clubs or organizations: Not on file     Relationship status: Not on file   • Intimate partner violence:     Fear of current or ex partner: Not on file     Emotionally abused: Not on file     Physically abused: Not on file     Forced sexual activity: Not on file   Other Topics Concern   • Not on file   Social History Narrative   • Not on file     Social History     Tobacco Use   Smoking Status Never Smoker   Smokeless Tobacco Never Used     Social History     Substance and Sexual Activity   Alcohol Use Not Currently       Allergies/Intolerances:  No Known Allergies    History reviewed with the patient     Other Current Medications:    Current Facility-Administered Medications:   •  vancomycin (VANCOCIN) 1,500 mg  in  mL IVPB, 13.5 mg/kg, Intravenous, Q12HR, Liliya Terrell M.D., Last Rate: 125 mL/hr at 09/14/19 1003, 1,500 mg at 09/14/19 1003  •  atorvastatin (LIPITOR) tablet 20 mg, 20 mg, Oral, Q EVENING, Liliya Terrell M.D., 20 mg at 09/13/19 1815  •  MD Alert...Vancomycin per Pharmacy, , Other, PHARMACY TO DOSE, Alpesh Grubbs M.D.  •  senna-docusate (PERICOLACE or SENOKOT S) 8.6-50 MG per tablet 2 Tab, 2 Tab, Oral, BID, 2 Tab at 09/14/19 0452 **AND** polyethylene glycol/lytes (MIRALAX) PACKET 1 Packet, 1 Packet, Oral, QDAY PRN **AND** magnesium hydroxide (MILK OF MAGNESIA) suspension 30 mL, 30 mL, Oral, QDAY PRN **AND** bisacodyl (DULCOLAX) suppository 10 mg, 10 mg, Rectal, QDAY PRN, Alpesh Grubbs M.D.  •  Notify provider if pain remains uncontrolled, , , CONTINUOUS **AND** Use the numeric rating scale (NRS-11) on regular floors and Critical-Care Pain Observation Tool (CPOT) on ICUs/Trauma to assess pain, , , CONTINUOUS **AND** Pulse Ox (Oximetry), , , CONTINUOUS **AND** Pharmacy Consult Request ...Pain Management Review 1 Each, 1 Each, Other, PHARMACY TO DOSE **AND** If patient difficult to arouse and/or has respiratory depression, stop any opiates that are currently infusing and call a Rapid Response., , , CONTINUOUS **AND** oxyCODONE immediate-release (ROXICODONE) tablet 5 mg, 5 mg, Oral, Q3HRS PRN **AND** oxyCODONE immediate-release (ROXICODONE) tablet 10 mg, 10 mg, Oral, Q3HRS PRN, 10 mg at 09/14/19 1003 **AND** morphine (pf) 4 mg/ml injection 4 mg, 4 mg, Intravenous, Q3HRS PRN, Alpesh Grubbs M.D., 4 mg at 09/14/19 0014  •  ondansetron (ZOFRAN) syringe/vial injection 4 mg, 4 mg, Intravenous, Q4HRS PRN, Alpesh Grubbs M.D.  •  ondansetron (ZOFRAN ODT) dispertab 4 mg, 4 mg, Oral, Q4HRS PRN, Alpesh Grubbs M.D.  •  promethazine (PHENERGAN) tablet 12.5-25 mg, 12.5-25 mg, Oral, Q4HRS PRN, Alpesh Grubbs M.D.  •  promethazine (PHENERGAN) suppository 12.5-25 mg, 12.5-25 mg, Rectal, Q4HRS PRN,  Alpesh Grubbs M.D.  •  prochlorperazine (COMPAZINE) injection 5-10 mg, 5-10 mg, Intravenous, Q4HRS PRN, Alpesh Grubbs M.D.  [unfilled]    Most Recent Vital Signs:  /75   Pulse 93   Temp 37.5 °C (99.5 °F) (Oral)   Resp 18   Ht 1.829 m (6')   Wt 113.6 kg (250 lb 7.1 oz)   SpO2 94%   BMI 33.97 kg/m²   Temp  Av.8 °C (98.3 °F)  Min: 35.9 °C (96.6 °F)  Max: 37.8 °C (100.1 °F)    Physical Exam:  Physical Exam   Constitutional: He is oriented to person, place, and time and well-developed, well-nourished, and in no distress.   HENT:   Head: Normocephalic and atraumatic.   Eyes: Pupils are equal, round, and reactive to light. Conjunctivae and EOM are normal.   Cardiovascular: Normal rate, regular rhythm and normal heart sounds.   Pulmonary/Chest: Effort normal and breath sounds normal.   Abdominal: Soft. Bowel sounds are normal. He exhibits no distension. There is no tenderness. There is no rebound and no guarding.   Musculoskeletal: He exhibits edema and tenderness.   Left knee with edema, erythema, warmth, staples in place.  No significant drainage.  Tender to palpation.   Neurological: He is alert and oriented to person, place, and time.   Skin: Skin is warm and dry.   Psychiatric: Affect and judgment normal.           Pertinent Lab Results:  Recent Labs     19  0545 19   WBC 10.4 7.3 8.2      Recent Labs     19  0545 19  042   HEMOGLOBIN 11.4* 11.9* 12.7*   HEMATOCRIT 34.6* 37.2* 39.8*   MCV 90.8 92.1 91.7   MCH 29.9 29.5 29.3   PLATELETCT 247 276 295         Recent Labs     19  0545 19  042   SODIUM 138 137 135   POTASSIUM 4.5 3.8 4.0   CHLORIDE 105 101 99   CO2 24 28 29   CREATININE 0.85 0.88 0.94        No results for input(s): ALBUMIN in the last 72 hours.    Invalid input(s): AST, ALT, ALKPHOS, BILITOT, TOTALBILIRUB, BILIRUBINTOT, BILIRUBINDIR, BILIRUBININD, ALKALINEPHOS     Pertinent  "Micro:  Results     Procedure Component Value Units Date/Time    CULTURE WOUND W/ GRAM STAIN [[id number]] Collected:  09/10/19 2309    Order Status:  Completed Specimen:  Wound Updated:  09/14/19 0725     Significant Indicator NEG     Source WND     Site Left Knee     Culture Result No growth at 72 hours.     Gram Stain Result Many WBCs.  Rare Gram positive cocci.      Narrative:       Surgery - swabs received    Anaerobic Culture [[id number]] Collected:  09/10/19 2309    Order Status:  Completed Specimen:  Wound Updated:  09/14/19 0725     Significant Indicator NEG     Source WND     Site Left Knee     Culture Result Culture in progress.    Narrative:       Surgery - swabs received    Blood Culture [[id number]] Collected:  09/10/19 2225    Order Status:  Completed Specimen:  Blood from Peripheral Updated:  09/12/19 1303     Significant Indicator NEG     Source BLD     Site PERIPHERAL     Culture Result No Growth  Note: Blood cultures are incubated for 5 days and  are monitored continuously.Positive blood cultures  are called to the RN and reported as soon as  they are identified.      Narrative:       From different peripheral sites, if not done within the last  24 hours (Per Hospital Policy: Only change specimen source to  \"Line\" if specified by physician order)  Left AC    Blood Culture [[id number]] Collected:  09/10/19 2225    Order Status:  Completed Specimen:  Blood from Peripheral Updated:  09/12/19 1303     Significant Indicator NEG     Source BLD     Site PERIPHERAL     Culture Result No Growth  Note: Blood cultures are incubated for 5 days and  are monitored continuously.Positive blood cultures  are called to the RN and reported as soon as  they are identified.      Narrative:       From different peripheral sites, if not done within the last  24 hours (Per Hospital Policy: Only change specimen source to  \"Line\" if specified by physician order)  Left Hand    GRAM STAIN [[id number]] Collected:  09/10/19 2309    " "Order Status:  Completed Specimen:  Wound Updated:  09/11/19 0557     Significant Indicator .     Source WND     Site Left Knee     Gram Stain Result Many WBCs.  Rare Gram positive cocci.      Narrative:       Surgery - swabs received        No results found for: BLOODCULTU, BLDCULT, BCHOLD     Studies:  No results found.    ASSESSMENT/PLAN:      Deanna Moe is a 53 y.o.  admitted 9/10/2019. Pt has no significant past medical history.  He is in the halfway system and states that approximately 1 month ago he \"bumped\" his knee.  Since that time he had slowly progressive edema, redness and pain.  Symptoms progressed until just prior to admit when he had severe pain edema and was unable to ambulate.  He was initially brought to an outside facility ([facility] in [place]) and per notes arthrocentesis revealed markedly elevated WBC count and initial Gram stain with GPC's.  Per notes the outside facility arthrocentesis with WBC neutral count of 112,000.     Hospital Course:   He has been afebrile but yesterday had low-grade temperature of 100.1.  No leukocytosis and ESR of 64 on 9/10.  He went to the OR on 9/10 for washout of the knee.  OR cultures were obtained.       Left septic knee  - Injury some weeks ago and progressed, no prior his of knee injury and native joint   - Arthrocentesis at [facility] with WBC of 112,000 confirmed verbally and gram stain with rare GPCs, no growth on culture and final at 72 hours.  They have no remaining specimen.  - OR on 9/10, no op note yet- OR cultures with GPCs and so far no growth -I have asked microbiology to hold the cultures for 14 days  -Today he is complaining of been increased pain and swelling in the knee.     --- Continue vancomycin, will add ceftriaxone  --- Continue to monitor, if he has new fevers or pain and swelling continue to increase will need Ortho reevaluation for possible repeat procedure  --- If he spikes a fever please obtain blood culture  --- Follow " labs      Plan of care discussed with KRUPA Terrell M.D.. Will continue to follow    Denisse Mazariegos M.D.

## 2019-09-14 NOTE — CARE PLAN
Problem: Bowel/Gastric:  Goal: Normal bowel function is maintained or improved  Outcome: PROGRESSING AS EXPECTED   The pt is having regular BMs at this time.     Problem: Pain Management  Goal: Pain level will decrease to patient's comfort goal  Outcome: PROGRESSING AS EXPECTED   The pt's pain is being controlled through his PRN medications and non-pharm interventions.

## 2019-09-15 ENCOUNTER — APPOINTMENT (OUTPATIENT)
Dept: RADIOLOGY | Facility: MEDICAL CENTER | Age: 54
DRG: 485 | End: 2019-09-15
Attending: INTERNAL MEDICINE
Payer: COMMERCIAL

## 2019-09-15 LAB
ANION GAP SERPL CALC-SCNC: 9 MMOL/L (ref 0–11.9)
BASOPHILS # BLD AUTO: 0.5 % (ref 0–1.8)
BASOPHILS # BLD: 0.05 K/UL (ref 0–0.12)
BUN SERPL-MCNC: 13 MG/DL (ref 8–22)
CALCIUM SERPL-MCNC: 8.8 MG/DL (ref 8.5–10.5)
CHLORIDE SERPL-SCNC: 97 MMOL/L (ref 96–112)
CO2 SERPL-SCNC: 27 MMOL/L (ref 20–33)
CREAT SERPL-MCNC: 0.94 MG/DL (ref 0.5–1.4)
EOSINOPHIL # BLD AUTO: 0.21 K/UL (ref 0–0.51)
EOSINOPHIL NFR BLD: 2.1 % (ref 0–6.9)
ERYTHROCYTE [DISTWIDTH] IN BLOOD BY AUTOMATED COUNT: 44.4 FL (ref 35.9–50)
GLUCOSE SERPL-MCNC: 124 MG/DL (ref 65–99)
HCT VFR BLD AUTO: 42.7 % (ref 42–52)
HGB BLD-MCNC: 13.6 G/DL (ref 14–18)
IMM GRANULOCYTES # BLD AUTO: 0.11 K/UL (ref 0–0.11)
IMM GRANULOCYTES NFR BLD AUTO: 1.1 % (ref 0–0.9)
LYMPHOCYTES # BLD AUTO: 2.05 K/UL (ref 1–4.8)
LYMPHOCYTES NFR BLD: 20.6 % (ref 22–41)
MCH RBC QN AUTO: 29.1 PG (ref 27–33)
MCHC RBC AUTO-ENTMCNC: 31.9 G/DL (ref 33.7–35.3)
MCV RBC AUTO: 91.4 FL (ref 81.4–97.8)
MONOCYTES # BLD AUTO: 1.45 K/UL (ref 0–0.85)
MONOCYTES NFR BLD AUTO: 14.6 % (ref 0–13.4)
NEUTROPHILS # BLD AUTO: 6.08 K/UL (ref 1.82–7.42)
NEUTROPHILS NFR BLD: 61.1 % (ref 44–72)
NRBC # BLD AUTO: 0 K/UL
NRBC BLD-RTO: 0 /100 WBC
PLATELET # BLD AUTO: 372 K/UL (ref 164–446)
PMV BLD AUTO: 9.7 FL (ref 9–12.9)
POTASSIUM SERPL-SCNC: 4.2 MMOL/L (ref 3.6–5.5)
RBC # BLD AUTO: 4.67 M/UL (ref 4.7–6.1)
SODIUM SERPL-SCNC: 133 MMOL/L (ref 135–145)
WBC # BLD AUTO: 10 K/UL (ref 4.8–10.8)

## 2019-09-15 PROCEDURE — A9270 NON-COVERED ITEM OR SERVICE: HCPCS | Performed by: INTERNAL MEDICINE

## 2019-09-15 PROCEDURE — 80048 BASIC METABOLIC PNL TOTAL CA: CPT

## 2019-09-15 PROCEDURE — 36415 COLL VENOUS BLD VENIPUNCTURE: CPT

## 2019-09-15 PROCEDURE — A9270 NON-COVERED ITEM OR SERVICE: HCPCS | Performed by: HOSPITALIST

## 2019-09-15 PROCEDURE — 700102 HCHG RX REV CODE 250 W/ 637 OVERRIDE(OP): Performed by: INTERNAL MEDICINE

## 2019-09-15 PROCEDURE — 93971 EXTREMITY STUDY: CPT | Mod: 26 | Performed by: INTERNAL MEDICINE

## 2019-09-15 PROCEDURE — 700111 HCHG RX REV CODE 636 W/ 250 OVERRIDE (IP): Performed by: INTERNAL MEDICINE

## 2019-09-15 PROCEDURE — 85025 COMPLETE CBC W/AUTO DIFF WBC: CPT

## 2019-09-15 PROCEDURE — 99231 SBSQ HOSP IP/OBS SF/LOW 25: CPT | Performed by: INTERNAL MEDICINE

## 2019-09-15 PROCEDURE — 700105 HCHG RX REV CODE 258: Performed by: INTERNAL MEDICINE

## 2019-09-15 PROCEDURE — 700102 HCHG RX REV CODE 250 W/ 637 OVERRIDE(OP): Performed by: HOSPITALIST

## 2019-09-15 PROCEDURE — 99233 SBSQ HOSP IP/OBS HIGH 50: CPT | Performed by: INTERNAL MEDICINE

## 2019-09-15 PROCEDURE — 770001 HCHG ROOM/CARE - MED/SURG/GYN PRIV*

## 2019-09-15 PROCEDURE — 93971 EXTREMITY STUDY: CPT | Mod: LT

## 2019-09-15 RX ORDER — ACETAMINOPHEN 325 MG/1
650 TABLET ORAL EVERY 6 HOURS PRN
Status: DISCONTINUED | OUTPATIENT
Start: 2019-09-15 | End: 2019-09-25 | Stop reason: HOSPADM

## 2019-09-15 RX ADMIN — OXYCODONE HYDROCHLORIDE 10 MG: 10 TABLET ORAL at 10:49

## 2019-09-15 RX ADMIN — HYDROMORPHONE HYDROCHLORIDE 2 MG: 2 TABLET ORAL at 21:05

## 2019-09-15 RX ADMIN — ATORVASTATIN CALCIUM 20 MG: 20 TABLET, FILM COATED ORAL at 16:53

## 2019-09-15 RX ADMIN — OXYCODONE HYDROCHLORIDE 10 MG: 10 TABLET ORAL at 04:37

## 2019-09-15 RX ADMIN — VANCOMYCIN HYDROCHLORIDE 1500 MG: 500 INJECTION, POWDER, LYOPHILIZED, FOR SOLUTION INTRAVENOUS at 09:03

## 2019-09-15 RX ADMIN — ACETAMINOPHEN 650 MG: 325 TABLET, FILM COATED ORAL at 20:13

## 2019-09-15 RX ADMIN — VANCOMYCIN HYDROCHLORIDE 1500 MG: 500 INJECTION, POWDER, LYOPHILIZED, FOR SOLUTION INTRAVENOUS at 21:05

## 2019-09-15 RX ADMIN — CEFTRIAXONE SODIUM 2 G: 2 INJECTION, POWDER, FOR SOLUTION INTRAMUSCULAR; INTRAVENOUS at 04:37

## 2019-09-15 RX ADMIN — HYDROMORPHONE HYDROCHLORIDE 2 MG: 2 TABLET ORAL at 09:09

## 2019-09-15 RX ADMIN — SENNOSIDES, DOCUSATE SODIUM 2 TABLET: 50; 8.6 TABLET, FILM COATED ORAL at 04:37

## 2019-09-15 RX ADMIN — HYDROMORPHONE HYDROCHLORIDE 2 MG: 2 TABLET ORAL at 16:52

## 2019-09-15 RX ADMIN — OXYCODONE HYDROCHLORIDE 10 MG: 10 TABLET ORAL at 18:14

## 2019-09-15 ASSESSMENT — ENCOUNTER SYMPTOMS
CHILLS: 0
DIARRHEA: 0
FOCAL WEAKNESS: 0
SHORTNESS OF BREATH: 0
PALPITATIONS: 0
FEVER: 0
ABDOMINAL PAIN: 0
MYALGIAS: 1
ABDOMINAL PAIN: 1
VOMITING: 0
NAUSEA: 0
CONSTIPATION: 0

## 2019-09-15 NOTE — PROGRESS NOTES
Pharmacy Kinetics 53 y.o. male on vancomycin day # 5   9/15/2019    Currently on Vancomycin 1500 mg iv q12hr (0900 2100)  Provider specified end date: TBD     Indication for Treatment: SSTI, Septic joint     Pertinent history per medical record: Admitted on 9/10/2019 for Septic Joint.  54 yo man w left septic knee, I&D on 9/10 by Dr. Coon.  Drain was removed .  Culture of the knee fluid pending.         Other antibiotics: Rocephin 2 g iv q24hr      Allergies: Patient has no known allergies.      List concerns for renal function  obesity     Pertinent cultures to date:   09/10/19:PBCx2:NGTD  09/10/19:Lt Knee,WND:NGTD     MRSA nares swab if pneumonia is a concern (ordered/positive/negative/n-a): NA    Recent Labs     19  0545 19  0421 09/15/19  0226   WBC 7.3 8.2 10.0   NEUTSPOLYS 50.40 54.80 61.10     Recent Labs     19  0545 19  0421 09/15/19  0226   BUN 14 15 13   CREATININE 0.88 0.94 0.94     Recent Labs     19  1345 19  0421   VANCOTROUGH  --  17.3   VANCORANDOM 17.0  --        Intake/Output Summary (Last 24 hours) at 9/15/2019 0737  Last data filed at 9/15/2019 0439  Gross per 24 hour   Intake 720 ml   Output 1985 ml   Net -1265 ml      /83   Pulse 81   Temp 37.2 °C (98.9 °F) (Temporal)   Resp 18   Ht 1.829 m (6')   Wt 113.6 kg (250 lb 7.1 oz)   SpO2 97%  Temp (24hrs), Av.6 °C (99.7 °F), Min:37.2 °C (98.9 °F), Max:37.9 °C (100.2 °F)      A/P        1. Vancomycin dose change: No change  2. Next vancomycin level: 19@0830  3. Goal trough: 12-16 mcg/mL   4. Comments: No leukocytosis, cx NGTD>72hr. Tmax 37.9.Renal indices stable over interval. Per MD note: clinically worsening , ID consult pending. Continue same     Jermaine Valero PharmD BCPS

## 2019-09-15 NOTE — PROGRESS NOTES
HOSPITAL MEDICINE PROGRESS NOTE    Date of service  9/15/2019    Chief Complaint  Leg Pain      Hospital Course:    52 yo man p/w left septic knee.  This was I&D on 9/10 by Dr. Coon.  Drain was removed 9/11.  Culture of the knee fluid finalized on September 14, 2018, negative for any organism.  Report from outside hospital of the fluid cultures also negative, Gram stain show gram-positive cocci.        Interval History Updates:  Hospital Day #Hospital Day: 5   No event overnight. Highest T 100.2 F o/n.    US LLE negative for DVT    Consultants/Specialty  Orthopaedics    Review of Systems   Review of Systems   Constitutional: Negative for chills and fever.   Respiratory: Negative for shortness of breath.    Cardiovascular: Negative for chest pain, palpitations and leg swelling.   Gastrointestinal: Negative for abdominal pain, constipation, diarrhea, nausea and vomiting.   Musculoskeletal: Positive for joint pain and myalgias.        Patient report left knee feel slightly better vs. Yesterday.   Skin: Negative for rash.   Neurological: Negative for focal weakness.   Endo/Heme/Allergies: Negative.    All other systems reviewed and are negative.       Medications:  • vancomycin  13.5 mg/kg Q12HR   • cefTRIAXone (ROCEPHIN) IV  2 g Q24HRS   • HYDROmorphone  2 mg Q4HRS PRN   • atorvastatin  20 mg Q EVENING   • MD Alert...Vancomycin per Pharmacy   PHARMACY TO DOSE   • senna-docusate  2 Tab BID    And   • polyethylene glycol/lytes  1 Packet QDAY PRN    And   • magnesium hydroxide  30 mL QDAY PRN    And   • bisacodyl  10 mg QDAY PRN   • Pharmacy Consult Request  1 Each PHARMACY TO DOSE    And   • oxyCODONE immediate-release  5 mg Q3HRS PRN    And   • oxyCODONE immediate-release  10 mg Q3HRS PRN    And   • morphine injection  4 mg Q3HRS PRN   • ondansetron  4 mg Q4HRS PRN   • ondansetron  4 mg Q4HRS PRN   • promethazine  12.5-25 mg Q4HRS PRN   • promethazine  12.5-25 mg Q4HRS PRN   • prochlorperazine  5-10 mg Q4HRS PRN        Physical Exam   Vitals:    09/14/19 1603 09/14/19 2050 09/15/19 0439 09/15/19 0900   BP: 137/79 123/76 132/83 108/57   Pulse: 95 89 81 94   Resp: 18 18 18 16   Temp: 37.9 °C (100.2 °F) 37.8 °C (100 °F) 37.2 °C (98.9 °F) 36.6 °C (97.9 °F)   TempSrc: Oral Oral Temporal Temporal   SpO2: 96% 92% 97% 95%   Weight:       Height:           Physical Exam   Constitutional: He is oriented to person, place, and time and well-developed, well-nourished, and in no distress. No distress.   HENT:   Head: Normocephalic and atraumatic.   Eyes: Pupils are equal, round, and reactive to light. Conjunctivae are normal. No scleral icterus.   Neck: Normal range of motion. Neck supple. No JVD present.   Cardiovascular: Normal rate, regular rhythm and normal heart sounds. Exam reveals no gallop and no friction rub.   No murmur heard.  Pulmonary/Chest: Effort normal and breath sounds normal. No respiratory distress. He has no wheezes. He has no rales. He exhibits no tenderness.   Abdominal: Soft. Bowel sounds are normal. There is no tenderness. There is no rebound.   Musculoskeletal: He exhibits tenderness. He exhibits no edema or deformity.   Left knee incision C/D/I.  Staples intact.  Drain removed.  Moderate degree of erythema surrounding the left knee.  No drainage.  More swelling today on exam.   Neurological: He is alert and oriented to person, place, and time.   Skin: Skin is warm and dry. No rash noted. He is not diaphoretic. No erythema.   Psychiatric: Mood and affect normal.   Nursing note and vitals reviewed.       Recent Labs     09/13/19  0545 09/14/19  0421 09/15/19  0226   WBC 7.3 8.2 10.0   RBC 4.04* 4.34* 4.67*   HEMOGLOBIN 11.9* 12.7* 13.6*   HEMATOCRIT 37.2* 39.8* 42.7   MCV 92.1 91.7 91.4   MCH 29.5 29.3 29.1   MCHC 32.0* 31.9* 31.9*   RDW 46.1 45.1 44.4   PLATELETCT 276 295 372   MPV 10.5 9.9 9.7      Laboratory   Recent Labs     09/13/19  0545 09/14/19  0421 09/15/19  0226   WBC 7.3 8.2 10.0   RBC 4.04* 4.34* 4.67*    HEMOGLOBIN 11.9* 12.7* 13.6*   HEMATOCRIT 37.2* 39.8* 42.7   PLATELETCT 276 295 372     Recent Labs     09/13/19  0545 09/14/19  0421 09/15/19  0226   SODIUM 137 135 133*   POTASSIUM 3.8 4.0 4.2   CHLORIDE 101 99 97   CO2 28 29 27   GLUCOSE 98 116* 124*   BUN 14 15 13   CREATININE 0.88 0.94 0.94      Recent Labs     09/13/19  0545 09/14/19  0421 09/15/19  0226   GLUCOSE 98 116* 124*           Lactic 2.1, 1.9, 3.4, 2.4, 3.0, 1.8, 1.7  ESR 64  CRP 20       No results found for: BLOODCULTU, BLDCULT, BCHOLD   Gram stain of knee fluid:  Rare gram+ cocci  ID/SENS pending  Culture finalized today September 14, 2018, negative.    Report of synovial fluid culture from outside hospital negative.  Gram stain of the fluid show gram-positive cocci.   Labs reviewed by me and noted above.    Radiology  IR-US GUIDED PIV  Narrative: EXAMINATION:                                                                          HISTORY/REASON FOR EXAM:  Ultrasound Guided PIV.     TECHNIQUE/EXAM DESCRIPTION AND NUMBER OF VIEWS:  Peripheral IV insertion   with ultrasound guidance.  The procedure was prepared using maximal   sterile barrier technique including sterile gown, mask, cap, and donning   of sterile gloves following appropriate hand hygiene and/or sterile scrub.   Patient skin site was prepped with 2% Chlorhexidine solution.       FINDINGS: Peripheral IV insertion with Ultrasound Guidance was performed   by qualified imaging nursing staff without the assistance of a   Radiologist.  Impression:  Ultrasound-guided PERIPHERAL IV INSERTION performed by   qualified nursing staff as above.  US-EXTREMITY VENOUS LOWER UNILAT LEFT   Vascular Laboratory   CONCLUSIONS   No prior study is available for comparison.    Normal left lower extremity superficial and deep venous examination.      COLEMANMA URBAN     Exam Date:     09/15/2019 08:40     Room #:     Inpatient     Priority:     Routine     Ht (in):             Wt (lb):     Ordering Physician:         MARIELOS OLIVARES     Referring Physician:       [id number]     Sonographer:               Lucila Wilson RVT, SOCO     Study Type:                Complete Unilateral     Technical Quality:         Good     Age:    53    Gender:     M     MRN:    [id number]     :    1965      BSA:     Indications:     Pain in Limb, Edema     CPT Codes:       26959     ICD Codes:       729.5  782.3     History:         Septic left knee with pain and swelling     Limitations:     PROCEDURES:   Left lower extremity venous duplex imaging.    The following venous structures were evaluated: common femoral, profunda    femoral, greater saphenous, femoral, popliteal , peroneal and posterior    tibial veins.    Serial compression, augmentation maneuvers,  color and spectral Doppler    flow evaluations were performed.      FINDINGS:   Left lower extremity -   Complete color filling and compressibility with normal venous flow dynamics    including spontaneous flow, response to augmentation maneuvers, and    respiratory phasicity.    No superficial or deep venous thrombosis.      Flow was evaluated in the contralateral common femoral vein and normal    venous flow dynamics including spontaneous flow, respiratory phasic    variation and augmentation were demonstrated.      Kwan Atkins MD   (Electronically Signed)   Final Date:      15 September 2019                     09:53              Assessment and Plan    Principal Problem:    Arthritis, septic (HCC) POA: Yes.  S/p I&D 9/10.  Both synovial fluid at the outside facility and collected during surgery here finalize negative for bacteria, although both of them had GS positive gram positive cocci.  Drain out .  US LLE neg DVT .  On vancomycin (9/10 - ) and CFTX ( - ) per ID.  Might need 2 wks IV abx then transition to oral abx.  Discussed with Dr. Mazariegos.        Sepsis (new).  Without organ dysfunction.  Was tachycardic with  elevated lactic acidosis.   Occur after admission, suspect due to infection and surgery releasing immune cytokines into blood stream.  Resolved.  Lactic acidosis resolved.  antibiotics as above.  Monitor for fever, leukocytosis, wound complications.      Lactic acidosis POA: Yes.  Resolving with aggressive IVF resuscitation.  Most likely due to septic arthitic.  Hemodynamics stable.  Treatment of underlying cause as above.      Acute hyperglycemia POA: Yes.  No h/o DM2.  Suspect due to septic knee/infection/sepsis physiology.  FSG trending down.  Patient decline monitor, so discontinued FSG monitor, ISS.  A1C is 6.0.      HLD (hyperlipidemia) POA: Yes.  Restart Lipitor.      DVT prophylaxis: SCD    CODE STATUS:  FULL CODE    Disposition: To be determined.    Case was discussed with Primary RN and Charge Nurse, Medical SW, , and Pharmacist on IDTround in addition to individual(s) mentioned above.    I have performed a physical exam and reviewed and updated ROS as of today, 9/15/2019.  In review of yesterday's note dated, 9/14/2019, there are no changes except as documented above.      Liliya Terrell M.D.

## 2019-09-15 NOTE — PROGRESS NOTES
HOSPITAL MEDICINE PROGRESS NOTE    Date of service  9/14/2019    Chief Complaint  Leg Pain      Hospital Course:    54 yo man p/w left septic knee.  This was I&D on 9/10 by Dr. Coon.  Drain was removed 9/11.  Culture of the knee fluid finalized on September 14, 2018, negative for any organism.  Report from outside hospital of the fluid cultures also negative, Gram stain show gram-positive cocci.        Interval History Updates:  Hospital Day #Hospital Day: 4   No event overnight.  Although with a blip in his temperature curve, temperature 100.1 at approximately 9 PM last night.    Consultants/Specialty  Orthopaedics    Review of Systems   Review of Systems   Constitutional: Negative for chills and fever.   Respiratory: Negative for shortness of breath.    Cardiovascular: Negative for chest pain, palpitations and leg swelling.   Gastrointestinal: Negative for abdominal pain, constipation, diarrhea, nausea and vomiting.   Musculoskeletal: Positive for joint pain.        Patient report left knee feel more tender, swollen, and worsening pain.   Skin: Negative for rash.   Neurological: Negative for focal weakness.   Endo/Heme/Allergies: Negative.    All other systems reviewed and are negative.       Medications:  • vancomycin  13.5 mg/kg Q12HR   • cefTRIAXone (ROCEPHIN) IV  2 g Q24HRS   • HYDROmorphone  2 mg Q4HRS PRN   • atorvastatin  20 mg Q EVENING   • MD Alert...Vancomycin per Pharmacy   PHARMACY TO DOSE   • senna-docusate  2 Tab BID    And   • polyethylene glycol/lytes  1 Packet QDAY PRN    And   • magnesium hydroxide  30 mL QDAY PRN    And   • bisacodyl  10 mg QDAY PRN   • Pharmacy Consult Request  1 Each PHARMACY TO DOSE    And   • oxyCODONE immediate-release  5 mg Q3HRS PRN    And   • oxyCODONE immediate-release  10 mg Q3HRS PRN    And   • morphine injection  4 mg Q3HRS PRN   • ondansetron  4 mg Q4HRS PRN   • ondansetron  4 mg Q4HRS PRN   • promethazine  12.5-25 mg Q4HRS PRN   • promethazine  12.5-25 mg Q4HRS  PRN   • prochlorperazine  5-10 mg Q4HRS PRN       Physical Exam   Vitals:    09/14/19 0500 09/14/19 0815 09/14/19 0925 09/14/19 1603   BP: 140/80 128/75  137/79   Pulse: 84 93  95   Resp: 17 18  18   Temp: 36.9 °C (98.5 °F) 37.5 °C (99.5 °F)  37.9 °C (100.2 °F)   TempSrc: Temporal Oral  Oral   SpO2: 95% 94%  96%   Weight:   113.6 kg (250 lb 7.1 oz)    Height:           Physical Exam   Constitutional: He is oriented to person, place, and time and well-developed, well-nourished, and in no distress. No distress.   HENT:   Head: Normocephalic and atraumatic.   Eyes: Pupils are equal, round, and reactive to light. Conjunctivae are normal. No scleral icterus.   Neck: Normal range of motion. Neck supple. No JVD present.   Cardiovascular: Normal rate, regular rhythm and normal heart sounds. Exam reveals no gallop and no friction rub.   No murmur heard.  Pulmonary/Chest: Effort normal and breath sounds normal. No respiratory distress. He has no wheezes. He has no rales. He exhibits no tenderness.   Abdominal: Soft. Bowel sounds are normal. There is no tenderness. There is no rebound.   Musculoskeletal: He exhibits tenderness. He exhibits no edema or deformity.   Left knee incision C/D/I.  Staples intact.  Drain removed.  Moderate degree of erythema surrounding the left knee.  No drainage.  More swelling today on exam.   Neurological: He is alert and oriented to person, place, and time.   Skin: Skin is warm and dry. No rash noted. He is not diaphoretic. No erythema.   Psychiatric: Mood and affect normal.   Nursing note and vitals reviewed.       Recent Labs     09/12/19  0016 09/13/19  0545 09/14/19  0421   WBC 10.4 7.3 8.2   RBC 3.81* 4.04* 4.34*   HEMOGLOBIN 11.4* 11.9* 12.7*   HEMATOCRIT 34.6* 37.2* 39.8*   MCV 90.8 92.1 91.7   MCH 29.9 29.5 29.3   MCHC 32.9* 32.0* 31.9*   RDW 45.5 46.1 45.1   PLATELETCT 247 276 295   MPV 9.8 10.5 9.9      Laboratory   Recent Labs     09/12/19  0016 09/13/19  0545 09/14/19  0421   WBC 10.4  7.3 8.2   RBC 3.81* 4.04* 4.34*   HEMOGLOBIN 11.4* 11.9* 12.7*   HEMATOCRIT 34.6* 37.2* 39.8*   PLATELETCT 247 276 295     Recent Labs     09/12/19  0016 09/13/19  0545 09/14/19  0421   SODIUM 138 137 135   POTASSIUM 4.5 3.8 4.0   CHLORIDE 105 101 99   CO2 24 28 29   GLUCOSE 135* 98 116*   BUN 17 14 15   CREATININE 0.85 0.88 0.94      Recent Labs     09/12/19  0016 09/13/19  0545 09/14/19  0421   GLUCOSE 135* 98 116*           Lactic 2.1, 1.9, 3.4, 2.4, 3.0, 1.8, 1.7  ESR 64  CRP 20       No results found for: BLOODCULTU, BLDCULT, BCHOLD   Gram stain of knee fluid:  Rare gram+ cocci  ID/SENS pending  Culture finalized today September 14, 2018, negative.    Report of synovial fluid culture from outside hospital negative.  Gram stain of the fluid show gram-positive cocci.   Labs reviewed by me and noted above.    Radiology  No image results found.              Assessment and Plan    Principal Problem:    Arthritis, septic (HCC) POA: Yes.  S/p I&D 9/10.  Both synovial fluid at the outside facility and collected during surgery here finalize negative for bacteria, although both of them had GS positive gram positive cocci.  Drain out 9/12 per Ortho.  On exam, clinically worsening.  Low grade fever O/N.  Therefore, I discussed the case and request consultation from Dr. Mazariegos, infectious disease.  Patient evaluated and continues on vancomycin with addition of IV ceftriaxone.  Adding p.o. Dilaudid 2 mg every 4 hours as needed for better pain control.  Low threshold for CT with contrast.  I will check an ultrasound with Doppler of the left lower extremity to rule out DVT.  Might even need to go back to the OR for exploration.      Sepsis (new).  Without organ dysfunction.  Was tachycardic with elevated lactic acidosis.   Occur after admission, suspect due to infection and surgery releasing immune cytokines into blood stream.  Resolved.  Lactic acidosis resolved.  antibiotics as above.  Monitor for fever, leukocytosis,  wound complications.      Lactic acidosis POA: Yes.  Resolving with aggressive IVF resuscitation.  Most likely due to septic arthitic.  Hemodynamics stable.  Treatment of underlying cause as above.      Acute hyperglycemia POA: Yes.  No h/o DM2.  Suspect due to septic knee/infection/sepsis physiology.  FSG trending down.  Patient decline monitor, so discontinued FSG monitor, ISS.  A1C is 6.0.      HLD (hyperlipidemia) POA: Yes.  Restart Lipitor.      DVT prophylaxis: SCD    CODE STATUS:  FULL CODE    Disposition: To be determined.    Case was discussed with Primary RN and Charge Nurse, Medical SW, , and Pharmacist on IDTround in addition to individual(s) mentioned above.    I have performed a physical exam and reviewed and updated ROS as of today, 9/14/2019.  In review of yesterday's note dated, 9/13/2019, there are no changes except as documented above.      Liliya Terrell M.D.

## 2019-09-15 NOTE — CARE PLAN
Pt continues to c/o L knee pain. Medicated with prn pain medications per order. Encouraging alternative treatments for pain. Will continue to monitor.

## 2019-09-15 NOTE — PROGRESS NOTES
Infectious Disease Progress Note    Author: Denisse Mazariegos M.D. Date & Time of service: 9/15/2019  2:15 PM    Chief Complaint:  Left septic knee     Interval History:    Review of Systems:  Review of Systems   Constitutional: Negative for chills, fever and malaise/fatigue.   Gastrointestinal: Positive for abdominal pain. Negative for constipation, diarrhea, nausea and vomiting.   Musculoskeletal: Positive for joint pain and myalgias.       Hemodynamics:  Temp (24hrs), Av.4 °C (99.3 °F), Min:36.6 °C (97.9 °F), Max:37.9 °C (100.2 °F)  Temperature: 36.6 °C (97.9 °F)  Pulse  Av.7  Min: 63  Max: 104   Blood Pressure: 108/57       Physical Exam:  Physical Exam   Constitutional: He is oriented to person, place, and time. He appears well-developed and well-nourished.   HENT:   Head: Normocephalic and atraumatic.   Eyes: Pupils are equal, round, and reactive to light. Conjunctivae and EOM are normal.   Cardiovascular: Normal rate, regular rhythm and normal heart sounds.   Pulmonary/Chest: Effort normal and breath sounds normal.   Abdominal: Soft. Bowel sounds are normal. He exhibits no distension. There is tenderness. There is no rebound and no guarding.   Musculoskeletal: He exhibits edema and tenderness.   Left knee with edema, erythema, warmth and tenderness.  No significant drainage.  Staples in place.    Left arm with some edema due to IV infiltration.   Neurological: He is alert and oriented to person, place, and time.   Skin: Skin is warm and dry.   Psychiatric: He has a normal mood and affect. His behavior is normal.       Meds:    Current Facility-Administered Medications:   •  vancomycin  •  cefTRIAXone (ROCEPHIN) IV  •  HYDROmorphone  •  atorvastatin  •  MD Alert...Vancomycin per Pharmacy  •  senna-docusate **AND** polyethylene glycol/lytes **AND** magnesium hydroxide **AND** bisacodyl  •  Notify provider if pain remains uncontrolled **AND** Use the numeric rating scale (NRS-11) on regular floors  and Critical-Care Pain Observation Tool (CPOT) on ICUs/Trauma to assess pain **AND** Pulse Ox (Oximetry) **AND** Pharmacy Consult Request **AND** If patient difficult to arouse and/or has respiratory depression, stop any opiates that are currently infusing and call a Rapid Response. **AND** oxyCODONE immediate-release **AND** oxyCODONE immediate-release **AND** morphine injection  •  ondansetron  •  ondansetron  •  promethazine  •  promethazine  •  prochlorperazine    Labs:  Recent Labs     09/13/19  0545 09/14/19  0421 09/15/19  0226   WBC 7.3 8.2 10.0   RBC 4.04* 4.34* 4.67*   HEMOGLOBIN 11.9* 12.7* 13.6*   HEMATOCRIT 37.2* 39.8* 42.7   MCV 92.1 91.7 91.4   MCH 29.5 29.3 29.1   RDW 46.1 45.1 44.4   PLATELETCT 276 295 372   MPV 10.5 9.9 9.7   NEUTSPOLYS 50.40 54.80 61.10   LYMPHOCYTES 33.00 28.10 20.60*   MONOCYTES 14.50* 13.40 14.60*   EOSINOPHILS 1.10 2.30 2.10   BASOPHILS 0.50 0.50 0.50     Recent Labs     09/13/19  0545 09/14/19  0421 09/15/19  0226   SODIUM 137 135 133*   POTASSIUM 3.8 4.0 4.2   CHLORIDE 101 99 97   CO2 28 29 27   GLUCOSE 98 116* 124*   BUN 14 15 13     Recent Labs     09/13/19  0545 09/14/19  0421 09/15/19  0226   CREATININE 0.88 0.94 0.94       Imaging:  Ir-us Guided Piv    Result Date: 9/15/2019  EXAMINATION:                                                                    HISTORY/REASON FOR EXAM:  Ultrasound Guided PIV.  TECHNIQUE/EXAM DESCRIPTION AND NUMBER OF VIEWS:  Peripheral IV insertion with ultrasound guidance.  The procedure was prepared using maximal sterile barrier technique including sterile gown, mask, cap, and donning of sterile gloves following appropriate hand hygiene and/or sterile scrub. Patient skin site was prepped with 2% Chlorhexidine solution.   FINDINGS: Peripheral IV insertion with Ultrasound Guidance was performed by qualified imaging nursing staff without the assistance of a Radiologist.      Ultrasound-guided PERIPHERAL IV INSERTION performed by qualified  nursing staff as above.     Us-extremity Venous Lower Unilat Left    Result Date: 9/15/2019   Vascular Laboratory  CONCLUSIONS  No prior study is available for comparison.  Normal left lower extremity superficial and deep venous examination.  URBAN GALVAN  Exam Date:     09/15/2019 08:40  Room #:     Inpatient  Priority:     Routine  Ht (in):             Wt (lb):  Ordering Physician:        MARIELOS OLIVARES  Referring Physician:       [id number] Moore  Sonographer:               Lucila Wilson RVT, RDCS  Study Type:                Complete Unilateral  Technical Quality:         Good  Age:    53    Gender:     M  MRN:    [id number]  :    1965      BSA:  Indications:     Pain in Limb, Edema  CPT Codes:       35555  ICD Codes:       729.5  782.3  History:         Septic left knee with pain and swelling  Limitations:  PROCEDURES:  Left lower extremity venous duplex imaging.  The following venous structures were evaluated: common femoral, profunda  femoral, greater saphenous, femoral, popliteal , peroneal and posterior  tibial veins.  Serial compression, augmentation maneuvers,  color and spectral Doppler  flow evaluations were performed.  FINDINGS:  Left lower extremity -  Complete color filling and compressibility with normal venous flow dynamics  including spontaneous flow, response to augmentation maneuvers, and  respiratory phasicity.  No superficial or deep venous thrombosis.  Flow was evaluated in the contralateral common femoral vein and normal  venous flow dynamics including spontaneous flow, respiratory phasic  variation and augmentation were demonstrated.  Kwan Atkins MD  (Electronically Signed)  Final Date:      15 September 2019                   09:53      Micro:  Results     Procedure Component Value Units Date/Time    CULTURE WOUND W/ GRAM STAIN [[id number]] Collected:  09/10/19 2307    Order Status:  Completed Specimen:  Wound Updated:  09/15/19 0909      "Significant Indicator NEG     Source WND     Site Left Knee     Culture Result No growth at 4 days.     Gram Stain Result Many WBCs.  Rare Gram positive cocci.      Narrative:       Surgery - swabs received    Anaerobic Culture [[id number]] Collected:  09/10/19 2309    Order Status:  Completed Specimen:  Wound Updated:  09/15/19 0909     Significant Indicator NEG     Source WND     Site Left Knee     Culture Result Culture in progress.    Narrative:       Surgery - swabs received    Blood Culture [[id number]] Collected:  09/10/19 2225    Order Status:  Completed Specimen:  Blood from Peripheral Updated:  09/12/19 1303     Significant Indicator NEG     Source BLD     Site PERIPHERAL     Culture Result No Growth  Note: Blood cultures are incubated for 5 days and  are monitored continuously.Positive blood cultures  are called to the RN and reported as soon as  they are identified.      Narrative:       From different peripheral sites, if not done within the last  24 hours (Per Hospital Policy: Only change specimen source to  \"Line\" if specified by physician order)  Left AC    Blood Culture [[id number]] Collected:  09/10/19 2225    Order Status:  Completed Specimen:  Blood from Peripheral Updated:  09/12/19 1303     Significant Indicator NEG     Source BLD     Site PERIPHERAL     Culture Result No Growth  Note: Blood cultures are incubated for 5 days and  are monitored continuously.Positive blood cultures  are called to the RN and reported as soon as  they are identified.      Narrative:       From different peripheral sites, if not done within the last  24 hours (Per Hospital Policy: Only change specimen source to  \"Line\" if specified by physician order)  Left Hand    GRAM STAIN [[id number]] Collected:  09/10/19 2309    Order Status:  Completed Specimen:  Wound Updated:  09/11/19 0557     Significant Indicator .     Source WND     Site Left Knee     Gram Stain Result Many WBCs.  Rare Gram positive cocci.      Narrative:    " "   Surgery - swabs received          Assessment:  Active Hospital Problems    Diagnosis   • *Septic arthritis of knee, left (HCC) [M00.9]   • Lactic acidosis [E87.2]   • Acute hyperglycemia [R73.9]   • Obesity [E66.9]   • HLD (hyperlipidemia) [E78.5]     Interval 24 hour assessment:      100.2 on 9/14, O2 RA  Labs reviewed  Micro reviewed    Pt continued on vancomycin and ceftriaxone 2 g every 24.  The knee is less swollen and less painful today.  But still with significant edema, warmth  and tenderness.      ASSESSMENT/PLAN:      Deanna Moe is a 53 y.o.  admitted 9/10/2019. Pt has no significant past medical history.  He is in the [facility] system and states that approximately 1 month ago he \"bumped\" his knee.  Since that time he had slowly progressive edema, redness and pain.  Symptoms progressed until just prior to admit when he had severe pain edema and was unable to ambulate.  He was initially brought to an outside facility ([facility] in [place]) and per notes arthrocentesis revealed markedly elevated WBC count and initial Gram stain with GPC's.  Per notes the outside facility arthrocentesis with WBC neutral count of 112,000.      Hospital Course:   He has been afebrile but yesterday had low-grade temperature of 100.1.  No leukocytosis and ESR of 64 on 9/10.  He went to the OR on 9/10 for washout of the knee.  OR cultures were obtained.         Left septic knee  - Injury some weeks ago and progressed, no prior his of knee injury and native joint   - Arthrocentesis at [facility] with WBC of 112,000 confirmed verbally and gram stain with rare GPCs, no growth on culture and final at 72 hours.  They have no remaining specimen.  - OR on 9/10, no op note yet- OR cultures with GPCs and so far no growth -I have asked microbiology to hold the cultures for 14 days  -Today he is complaining of been increased pain and swelling in the knee.      --- Continue vancomycin, and ceftriaxone- will likely need 2 weeks of " IV antibiotics and then may be able to transition to orals for an additional 2 weeks-antibiotic selection and duration to be determined based on clinical status and if any positive culture results are obtained  --- Follow-up wound cultures, asked micro to hold for 14-day  --- Continue to monitor, if he has new fevers or pain and swelling increase will need Ortho re-evaluation for possible repeat procedure  --- If he spikes a fever please obtain blood culture  --- Follow labs        Plan of care discussed with KRUPA Terrell M.D.. Will continue to follow.

## 2019-09-16 LAB
ANION GAP SERPL CALC-SCNC: 10 MMOL/L (ref 0–11.9)
BACTERIA BLD CULT: NORMAL
BACTERIA BLD CULT: NORMAL
BASOPHILS # BLD AUTO: 0.7 % (ref 0–1.8)
BASOPHILS # BLD: 0.06 K/UL (ref 0–0.12)
BUN SERPL-MCNC: 18 MG/DL (ref 8–22)
CALCIUM SERPL-MCNC: 9.1 MG/DL (ref 8.5–10.5)
CHLORIDE SERPL-SCNC: 98 MMOL/L (ref 96–112)
CO2 SERPL-SCNC: 25 MMOL/L (ref 20–33)
CREAT SERPL-MCNC: 1.01 MG/DL (ref 0.5–1.4)
EOSINOPHIL # BLD AUTO: 0.27 K/UL (ref 0–0.51)
EOSINOPHIL NFR BLD: 3.1 % (ref 0–6.9)
ERYTHROCYTE [DISTWIDTH] IN BLOOD BY AUTOMATED COUNT: 45 FL (ref 35.9–50)
GLUCOSE SERPL-MCNC: 115 MG/DL (ref 65–99)
HCT VFR BLD AUTO: 42.2 % (ref 42–52)
HGB BLD-MCNC: 13.8 G/DL (ref 14–18)
IMM GRANULOCYTES # BLD AUTO: 0.12 K/UL (ref 0–0.11)
IMM GRANULOCYTES NFR BLD AUTO: 1.4 % (ref 0–0.9)
LYMPHOCYTES # BLD AUTO: 1.42 K/UL (ref 1–4.8)
LYMPHOCYTES NFR BLD: 16 % (ref 22–41)
MCH RBC QN AUTO: 30.5 PG (ref 27–33)
MCHC RBC AUTO-ENTMCNC: 32.7 G/DL (ref 33.7–35.3)
MCV RBC AUTO: 93.4 FL (ref 81.4–97.8)
MONOCYTES # BLD AUTO: 1.54 K/UL (ref 0–0.85)
MONOCYTES NFR BLD AUTO: 17.4 % (ref 0–13.4)
NEUTROPHILS # BLD AUTO: 5.44 K/UL (ref 1.82–7.42)
NEUTROPHILS NFR BLD: 61.4 % (ref 44–72)
NRBC # BLD AUTO: 0 K/UL
NRBC BLD-RTO: 0 /100 WBC
PLATELET # BLD AUTO: 382 K/UL (ref 164–446)
PMV BLD AUTO: 9.6 FL (ref 9–12.9)
POTASSIUM SERPL-SCNC: 4.3 MMOL/L (ref 3.6–5.5)
RBC # BLD AUTO: 4.52 M/UL (ref 4.7–6.1)
SIGNIFICANT IND 70042: NORMAL
SIGNIFICANT IND 70042: NORMAL
SITE SITE: NORMAL
SITE SITE: NORMAL
SODIUM SERPL-SCNC: 133 MMOL/L (ref 135–145)
SOURCE SOURCE: NORMAL
SOURCE SOURCE: NORMAL
VANCOMYCIN TROUGH SERPL-MCNC: 12.7 UG/ML (ref 10–20)
WBC # BLD AUTO: 8.9 K/UL (ref 4.8–10.8)

## 2019-09-16 PROCEDURE — 700105 HCHG RX REV CODE 258: Performed by: INTERNAL MEDICINE

## 2019-09-16 PROCEDURE — 770001 HCHG ROOM/CARE - MED/SURG/GYN PRIV*

## 2019-09-16 PROCEDURE — 87040 BLOOD CULTURE FOR BACTERIA: CPT

## 2019-09-16 PROCEDURE — A9270 NON-COVERED ITEM OR SERVICE: HCPCS | Performed by: HOSPITALIST

## 2019-09-16 PROCEDURE — 80048 BASIC METABOLIC PNL TOTAL CA: CPT

## 2019-09-16 PROCEDURE — 700102 HCHG RX REV CODE 250 W/ 637 OVERRIDE(OP): Performed by: HOSPITALIST

## 2019-09-16 PROCEDURE — 85025 COMPLETE CBC W/AUTO DIFF WBC: CPT

## 2019-09-16 PROCEDURE — 80202 ASSAY OF VANCOMYCIN: CPT

## 2019-09-16 PROCEDURE — 700111 HCHG RX REV CODE 636 W/ 250 OVERRIDE (IP): Performed by: INTERNAL MEDICINE

## 2019-09-16 PROCEDURE — 700102 HCHG RX REV CODE 250 W/ 637 OVERRIDE(OP): Performed by: INTERNAL MEDICINE

## 2019-09-16 PROCEDURE — A9270 NON-COVERED ITEM OR SERVICE: HCPCS | Performed by: INTERNAL MEDICINE

## 2019-09-16 PROCEDURE — 99233 SBSQ HOSP IP/OBS HIGH 50: CPT | Performed by: INTERNAL MEDICINE

## 2019-09-16 PROCEDURE — 36415 COLL VENOUS BLD VENIPUNCTURE: CPT

## 2019-09-16 RX ADMIN — VANCOMYCIN HYDROCHLORIDE 1500 MG: 500 INJECTION, POWDER, LYOPHILIZED, FOR SOLUTION INTRAVENOUS at 09:44

## 2019-09-16 RX ADMIN — VANCOMYCIN HYDROCHLORIDE 1500 MG: 500 INJECTION, POWDER, LYOPHILIZED, FOR SOLUTION INTRAVENOUS at 20:27

## 2019-09-16 RX ADMIN — OXYCODONE HYDROCHLORIDE 10 MG: 10 TABLET ORAL at 09:51

## 2019-09-16 RX ADMIN — HYDROMORPHONE HYDROCHLORIDE 2 MG: 2 TABLET ORAL at 12:35

## 2019-09-16 RX ADMIN — CEFTRIAXONE SODIUM 2 G: 2 INJECTION, POWDER, FOR SOLUTION INTRAMUSCULAR; INTRAVENOUS at 04:38

## 2019-09-16 RX ADMIN — OXYCODONE HYDROCHLORIDE 10 MG: 10 TABLET ORAL at 16:49

## 2019-09-16 RX ADMIN — ACETAMINOPHEN 650 MG: 325 TABLET, FILM COATED ORAL at 04:38

## 2019-09-16 RX ADMIN — SENNOSIDES, DOCUSATE SODIUM 2 TABLET: 50; 8.6 TABLET, FILM COATED ORAL at 16:49

## 2019-09-16 RX ADMIN — HYDROMORPHONE HYDROCHLORIDE 2 MG: 2 TABLET ORAL at 07:55

## 2019-09-16 RX ADMIN — OXYCODONE HYDROCHLORIDE 10 MG: 10 TABLET ORAL at 00:21

## 2019-09-16 RX ADMIN — OXYCODONE HYDROCHLORIDE 10 MG: 10 TABLET ORAL at 20:27

## 2019-09-16 RX ADMIN — ATORVASTATIN CALCIUM 20 MG: 20 TABLET, FILM COATED ORAL at 16:49

## 2019-09-16 RX ADMIN — OXYCODONE HYDROCHLORIDE 10 MG: 10 TABLET ORAL at 04:38

## 2019-09-16 RX ADMIN — ENOXAPARIN SODIUM 40 MG: 100 INJECTION SUBCUTANEOUS at 09:51

## 2019-09-16 ASSESSMENT — ENCOUNTER SYMPTOMS
COUGH: 0
CONSTIPATION: 0
HEADACHES: 0
VOMITING: 0
DIARRHEA: 0
MYALGIAS: 1
NAUSEA: 0
CHILLS: 0
FEVER: 1
PALPITATIONS: 0
SHORTNESS OF BREATH: 0
FEVER: 0
ABDOMINAL PAIN: 0
DIZZINESS: 0
FOCAL WEAKNESS: 0

## 2019-09-16 NOTE — OP REPORT
DATE OF SERVICE:  09/10/2019    PREOPERATIVE DIAGNOSIS:  Septic left knee joint.    POSTOPERATIVE DIAGNOSIS:  Septic left knee joint.    PROCEDURE:  Irrigation and debridement, left knee.    SURGEON:  Brad Coon MD    ANESTHESIA:  General.    ANTIBIOTICS:  Ancef.    CULTURES:  Intraoperative cultures left knee joint.    INDICATIONS:  The patient presents with 2-3 days of increasing left knee pain.    He had an aspiration done at [facility], which showed 112,000 white blood   cells, his left knee synovial fluid with 9% neutrophils, gram-positive cocci.    His symptoms are consistent with a septic joint, left knee.  He elects to   undergo irrigation and debridement, left knee.  Risks of surgery were   discussed at length, all questions answered, no guarantees given.    DESCRIPTION OF PROCEDURE:  The patient was properly identified in the   preoperative holding, left knee was marked as the correct surgical site.  He   was taken back and placed supine on the operating table where he underwent   general anesthesia.  Left lower extremity was sterilely prepped and draped in   standard fashion.  Limb was exsanguinated, tourniquet was insufflated.  A   sterile midline incision was made followed by medial patellar arthrotomy.    There was significant amount of purulent fluid seen in the joint and extensive   synovitis as well, so complete irrigation and debridement was performed   including a synovectomy.  The cartilage itself looked good.  The meniscus   looked intact as well as the ACL and PCL.  This was thoroughly debrided and   then 3 liters of sterile saline was used followed by soaking with Betadine   saline and then Hemovac drain was placed.  Tourniquet was deflated.  Good   hemostasis obtained, closed with #1 Vicryl, 2-0 Vicryl, and staples on skin.    All counts were correct.  Soft dressings were applied.  Patient was extubated   and sent to recovery room in stable condition.        ___________________________________MD YRIS SAWYER    DD:  09/16/2019 10:38:56  DT:  09/16/2019 10:56:41    D#:  [id number]  Job#:  [id number]

## 2019-09-16 NOTE — PROGRESS NOTES
Received report from Kim DEAN. Assumed patient care. Patient complaining of pain to his left upper and lower extremities.  CMS intact. Patient experiencing a low grade fever of 100.3F. Assessed patient, he is asymptomatic, paged MD to update. Spoke with MD Ross, received telephone orders for Tylenol. Administered per MAR. Will continue to monitor. Patient does have to guards at bedside.

## 2019-09-16 NOTE — PROGRESS NOTES
Infectious Disease Progress Note    Author: Yumiko Da Silva M.D. Date & Time of service: 2019  11:35 AM    Chief Complaint:  FU Left septic knee     Interval History:   Tmax 100.8 WBC 8.9 Ongoing left knee pain. Unable to stand or ambulate. Tolerating IV abx without issues.    Review of Systems:  Review of Systems   Constitutional: Negative for chills, fever and malaise/fatigue.   Respiratory: Negative for cough and shortness of breath.    Gastrointestinal: Negative for abdominal pain, constipation, diarrhea, nausea and vomiting.   Musculoskeletal: Positive for joint pain and myalgias.   Neurological: Negative for dizziness and headaches.   All other systems reviewed and are negative.      Hemodynamics:  Temp (24hrs), Av.4 °C (99.4 °F), Min:36.7 °C (98.1 °F), Max:38.2 °C (100.8 °F)  Temperature: 36.7 °C (98.1 °F)  Pulse  Av.5  Min: 63  Max: 104   Blood Pressure: 107/66       Physical Exam:  Physical Exam   Constitutional: He is oriented to person, place, and time. He appears well-developed and well-nourished.   HENT:   Head: Normocephalic and atraumatic.   Fair dentition   Eyes: Pupils are equal, round, and reactive to light. Conjunctivae and EOM are normal.   Cardiovascular: Normal rate, regular rhythm and normal heart sounds.   Pulmonary/Chest: Effort normal and breath sounds normal.   Abdominal: Soft. Bowel sounds are normal. He exhibits no distension. There is tenderness. There is no rebound and no guarding.   Musculoskeletal: He exhibits edema and tenderness.   Left knee surgical site with staples in place, well approximated. No edema, or erythema. No draingage       Neurological: He is alert and oriented to person, place, and time.   Skin: Skin is warm and dry.   Multiple tattoos   Psychiatric: He has a normal mood and affect. His behavior is normal.       Meds:    Current Facility-Administered Medications:   •  enoxaparin  •  acetaminophen  •  vancomycin  •  cefTRIAXone (ROCEPHIN) IV  •   HYDROmorphone  •  atorvastatin  •  MD Alert...Vancomycin per Pharmacy  •  senna-docusate **AND** polyethylene glycol/lytes **AND** magnesium hydroxide **AND** bisacodyl  •  Notify provider if pain remains uncontrolled **AND** Use the numeric rating scale (NRS-11) on regular floors and Critical-Care Pain Observation Tool (CPOT) on ICUs/Trauma to assess pain **AND** Pulse Ox (Oximetry) **AND** Pharmacy Consult Request **AND** If patient difficult to arouse and/or has respiratory depression, stop any opiates that are currently infusing and call a Rapid Response. **AND** oxyCODONE immediate-release **AND** oxyCODONE immediate-release **AND** morphine injection  •  ondansetron  •  ondansetron  •  promethazine  •  promethazine  •  prochlorperazine    Labs:  Recent Labs     09/14/19  0421 09/15/19  0226 09/16/19  0423   WBC 8.2 10.0 8.9   RBC 4.34* 4.67* 4.52*   HEMOGLOBIN 12.7* 13.6* 13.8*   HEMATOCRIT 39.8* 42.7 42.2   MCV 91.7 91.4 93.4   MCH 29.3 29.1 30.5   RDW 45.1 44.4 45.0   PLATELETCT 295 372 382   MPV 9.9 9.7 9.6   NEUTSPOLYS 54.80 61.10 61.40   LYMPHOCYTES 28.10 20.60* 16.00*   MONOCYTES 13.40 14.60* 17.40*   EOSINOPHILS 2.30 2.10 3.10   BASOPHILS 0.50 0.50 0.70     Recent Labs     09/14/19  0421 09/15/19  0226 09/16/19  0423   SODIUM 135 133* 133*   POTASSIUM 4.0 4.2 4.3   CHLORIDE 99 97 98   CO2 29 27 25   GLUCOSE 116* 124* 115*   BUN 15 13 18     Recent Labs     09/14/19  0421 09/15/19  0226 09/16/19  0423   CREATININE 0.94 0.94 1.01       Imaging:  Ir-us Guided Piv    Result Date: 9/15/2019  EXAMINATION:                                                                    HISTORY/REASON FOR EXAM:  Ultrasound Guided PIV.  TECHNIQUE/EXAM DESCRIPTION AND NUMBER OF VIEWS:  Peripheral IV insertion with ultrasound guidance.  The procedure was prepared using maximal sterile barrier technique including sterile gown, mask, cap, and donning of sterile gloves following appropriate hand hygiene and/or sterile scrub.  Patient skin site was prepped with 2% Chlorhexidine solution.   FINDINGS: Peripheral IV insertion with Ultrasound Guidance was performed by qualified imaging nursing staff without the assistance of a Radiologist.      Ultrasound-guided PERIPHERAL IV INSERTION performed by qualified nursing staff as above.     Us-extremity Venous Lower Unilat Left    Result Date: 9/15/2019   Vascular Laboratory  CONCLUSIONS  No prior study is available for comparison.  Normal left lower extremity superficial and deep venous examination.  URBAN GALVAN  Exam Date:     09/15/2019 08:40  Room #:     Inpatient  Priority:     Routine  Ht (in):             Wt (lb):  Ordering Physician:        MARIELOS OLIVARES  Referring Physician:       [id number]  Sonographer:               Lucila Wilson RVT, RDCS  Study Type:                Complete Unilateral  Technical Quality:         Good  Age:    53    Gender:     M  MRN:    [id number]  :    1965      BSA:  Indications:     Pain in Limb, Edema  CPT Codes:       50121  ICD Codes:       729.5  782.3  History:         Septic left knee with pain and swelling  Limitations:  PROCEDURES:  Left lower extremity venous duplex imaging.  The following venous structures were evaluated: common femoral, profunda  femoral, greater saphenous, femoral, popliteal , peroneal and posterior  tibial veins.  Serial compression, augmentation maneuvers,  color and spectral Doppler  flow evaluations were performed.  FINDINGS:  Left lower extremity -  Complete color filling and compressibility with normal venous flow dynamics  including spontaneous flow, response to augmentation maneuvers, and  respiratory phasicity.  No superficial or deep venous thrombosis.  Flow was evaluated in the contralateral common femoral vein and normal  venous flow dynamics including spontaneous flow, respiratory phasic  variation and augmentation were demonstrated.  Kwan Atkins MD  (Electronically  "Signed)  Final Date:      15 September 2019                   09:53      Micro:  Results     Procedure Component Value Units Date/Time    BLOOD CULTURE [[id number]] Collected:  09/16/19 1006    Order Status:  Completed Specimen:  Blood from Peripheral Updated:  09/16/19 1013    Narrative:       Per Hospital Policy: Only change Specimen Src: to \"Line\" if  specified by physician order.    BLOOD CULTURE [[id number]] Collected:  09/16/19 1006    Order Status:  Completed Specimen:  Blood from Peripheral Updated:  09/16/19 1012    Narrative:       Per Hospital Policy: Only change Specimen Src: to \"Line\" if  specified by physician order.    Blood Culture [[id number]]     Order Status:  Canceled Specimen:  Blood from Peripheral     CULTURE WOUND W/ GRAM STAIN [[id number]] Collected:  09/10/19 2309    Order Status:  Completed Specimen:  Wound Updated:  09/16/19 0901     Significant Indicator NEG     Source WND     Site Left Knee     Culture Result No growth at 5 days.     Gram Stain Result Many WBCs.  Rare Gram positive cocci.      Narrative:       Surgery - swabs received    Anaerobic Culture [[id number]] Collected:  09/10/19 2309    Order Status:  Completed Specimen:  Wound Updated:  09/16/19 0901     Significant Indicator NEG     Source WND     Site Left Knee     Culture Result Culture in progress.    Narrative:       Surgery - swabs received    Blood Culture [[id number]] Collected:  09/10/19 2225    Order Status:  Completed Specimen:  Blood from Peripheral Updated:  09/16/19 0100     Significant Indicator NEG     Source BLD     Site PERIPHERAL     Culture Result No growth after 5 days of incubation.    Narrative:       From different peripheral sites, if not done within the last  24 hours (Per Hospital Policy: Only change specimen source to  \"Line\" if specified by physician order)  Left AC    Blood Culture [[id number]] Collected:  09/10/19 2225    Order Status:  Completed Specimen:  Blood from Peripheral Updated:  09/16/19 0100 " "    Significant Indicator NEG     Source BLD     Site PERIPHERAL     Culture Result No growth after 5 days of incubation.    Narrative:       From different peripheral sites, if not done within the last  24 hours (Per Hospital Policy: Only change specimen source to  \"Line\" if specified by physician order)  Left Hand    GRAM STAIN [[id number]] Collected:  09/10/19 0549    Order Status:  Completed Specimen:  Wound Updated:  09/11/19 0557     Significant Indicator .     Source WND     Site Left Knee     Gram Stain Result Many WBCs.  Rare Gram positive cocci.      Narrative:       Surgery - swabs received          Assessment:  Active Hospital Problems    Diagnosis   • *Septic arthritis of knee, left (HCC) [M00.9]         ASSESSMENT/PLAN:      Deanna Moe is a 53 y.o.  admitted 9/10/2019. Pt has no significant past medical history.  [facility] is in the shelter [facility] and states that approximately 1 month ago he \"bumped\" his knee.  Since that time he had slowly progressive edema, redness and pain.  Symptoms progressed until just prior to admit when he had severe pain edema and was unable to ambulate.  He was initially brought to an outside facility ([facility] in [place]) and per notes arthrocentesis revealed markedly elevated WBC count and initial Gram stain with GPC's.  Per notes the outside facility arthrocentesis with WBC neutral count of 112,000.  He went to the OR on 9/10 for washout of the knee.     Left knee septic arthritis  Febrile Tmax 100.8  No leukocytosis  - Injury some weeks ago and progressed, no prior his of knee injury and native joint   - Arthrocentesis at [facility] with WBC of 112,000 confirmed verbally and gram stain with rare GPCs, no growth on culture and final at 72 hours.  They have no remaining specimen.  - OR on 9/10, no op note yet- OR cultures with GPCs and so far no growth (on abx prior)  Microbiology holding cultures for 14 days  Continue vancomycin and ceftriaxone   Monitor renal function " and vanco trough  VT 12.7 today    Ongoing fevers  Tmax 100.8  Blood cultures obtained today  On abx above  If persistent, needs ortho re-evaluation for possible surgical intervention      Plan of care discussed with KRUPA Terrell M.D..

## 2019-09-16 NOTE — CARE PLAN
Problem: Safety  Goal: Will remain free from injury  Outcome: PROGRESSING AS EXPECTED  Goal: Will remain free from falls  Outcome: PROGRESSING AS EXPECTED     Problem: Infection  Goal: Will remain free from infection  Outcome: PROGRESSING AS EXPECTED

## 2019-09-16 NOTE — CARE PLAN
Problem: Communication  Goal: The ability to communicate needs accurately and effectively will improve  Outcome: PROGRESSING AS EXPECTED     Problem: Safety  Goal: Will remain free from injury  Outcome: PROGRESSING AS EXPECTED    Patient able to communicate needs effectively, verbalizes understanding to fall precautions in place.

## 2019-09-17 ENCOUNTER — APPOINTMENT (OUTPATIENT)
Dept: RADIOLOGY | Facility: MEDICAL CENTER | Age: 54
DRG: 485 | End: 2019-09-17
Attending: NURSE PRACTITIONER
Payer: COMMERCIAL

## 2019-09-17 LAB
ANION GAP SERPL CALC-SCNC: 10 MMOL/L (ref 0–11.9)
BASOPHILS # BLD AUTO: 0.5 % (ref 0–1.8)
BASOPHILS # BLD: 0.04 K/UL (ref 0–0.12)
BUN SERPL-MCNC: 17 MG/DL (ref 8–22)
CALCIUM SERPL-MCNC: 8.9 MG/DL (ref 8.5–10.5)
CHLORIDE SERPL-SCNC: 96 MMOL/L (ref 96–112)
CO2 SERPL-SCNC: 26 MMOL/L (ref 20–33)
CREAT SERPL-MCNC: 1.02 MG/DL (ref 0.5–1.4)
EOSINOPHIL # BLD AUTO: 0.27 K/UL (ref 0–0.51)
EOSINOPHIL NFR BLD: 3.2 % (ref 0–6.9)
ERYTHROCYTE [DISTWIDTH] IN BLOOD BY AUTOMATED COUNT: 42.5 FL (ref 35.9–50)
GLUCOSE SERPL-MCNC: 150 MG/DL (ref 65–99)
HCT VFR BLD AUTO: 40.9 % (ref 42–52)
HGB BLD-MCNC: 13.1 G/DL (ref 14–18)
IMM GRANULOCYTES # BLD AUTO: 0.14 K/UL (ref 0–0.11)
IMM GRANULOCYTES NFR BLD AUTO: 1.7 % (ref 0–0.9)
INR PPP: 1.14 (ref 0.87–1.13)
LYMPHOCYTES # BLD AUTO: 1.65 K/UL (ref 1–4.8)
LYMPHOCYTES NFR BLD: 19.7 % (ref 22–41)
MCH RBC QN AUTO: 28.8 PG (ref 27–33)
MCHC RBC AUTO-ENTMCNC: 32 G/DL (ref 33.7–35.3)
MCV RBC AUTO: 89.9 FL (ref 81.4–97.8)
MONOCYTES # BLD AUTO: 0.86 K/UL (ref 0–0.85)
MONOCYTES NFR BLD AUTO: 10.3 % (ref 0–13.4)
NEUTROPHILS # BLD AUTO: 5.43 K/UL (ref 1.82–7.42)
NEUTROPHILS NFR BLD: 64.6 % (ref 44–72)
NRBC # BLD AUTO: 0 K/UL
NRBC BLD-RTO: 0 /100 WBC
PLATELET # BLD AUTO: 440 K/UL (ref 164–446)
PMV BLD AUTO: 9.7 FL (ref 9–12.9)
POTASSIUM SERPL-SCNC: 3.9 MMOL/L (ref 3.6–5.5)
PROTHROMBIN TIME: 14.8 SEC (ref 12–14.6)
RBC # BLD AUTO: 4.55 M/UL (ref 4.7–6.1)
SODIUM SERPL-SCNC: 132 MMOL/L (ref 135–145)
WBC # BLD AUTO: 8.4 K/UL (ref 4.8–10.8)

## 2019-09-17 PROCEDURE — 700111 HCHG RX REV CODE 636 W/ 250 OVERRIDE (IP): Performed by: INTERNAL MEDICINE

## 2019-09-17 PROCEDURE — 80048 BASIC METABOLIC PNL TOTAL CA: CPT

## 2019-09-17 PROCEDURE — 700105 HCHG RX REV CODE 258: Performed by: INTERNAL MEDICINE

## 2019-09-17 PROCEDURE — 85610 PROTHROMBIN TIME: CPT

## 2019-09-17 PROCEDURE — 99233 SBSQ HOSP IP/OBS HIGH 50: CPT | Performed by: INTERNAL MEDICINE

## 2019-09-17 PROCEDURE — 85025 COMPLETE CBC W/AUTO DIFF WBC: CPT

## 2019-09-17 PROCEDURE — A9270 NON-COVERED ITEM OR SERVICE: HCPCS | Performed by: HOSPITALIST

## 2019-09-17 PROCEDURE — 700102 HCHG RX REV CODE 250 W/ 637 OVERRIDE(OP): Performed by: INTERNAL MEDICINE

## 2019-09-17 PROCEDURE — 99232 SBSQ HOSP IP/OBS MODERATE 35: CPT | Performed by: HOSPITALIST

## 2019-09-17 PROCEDURE — 700102 HCHG RX REV CODE 250 W/ 637 OVERRIDE(OP): Performed by: HOSPITALIST

## 2019-09-17 PROCEDURE — A9270 NON-COVERED ITEM OR SERVICE: HCPCS | Performed by: INTERNAL MEDICINE

## 2019-09-17 PROCEDURE — 36415 COLL VENOUS BLD VENIPUNCTURE: CPT

## 2019-09-17 PROCEDURE — 770001 HCHG ROOM/CARE - MED/SURG/GYN PRIV*

## 2019-09-17 RX ADMIN — SENNOSIDES, DOCUSATE SODIUM 2 TABLET: 50; 8.6 TABLET, FILM COATED ORAL at 05:51

## 2019-09-17 RX ADMIN — ENOXAPARIN SODIUM 40 MG: 100 INJECTION SUBCUTANEOUS at 05:51

## 2019-09-17 RX ADMIN — VANCOMYCIN HYDROCHLORIDE 1500 MG: 500 INJECTION, POWDER, LYOPHILIZED, FOR SOLUTION INTRAVENOUS at 09:21

## 2019-09-17 RX ADMIN — HYDROMORPHONE HYDROCHLORIDE 2 MG: 2 TABLET ORAL at 04:45

## 2019-09-17 RX ADMIN — HYDROMORPHONE HYDROCHLORIDE 2 MG: 2 TABLET ORAL at 15:09

## 2019-09-17 RX ADMIN — OXYCODONE HYDROCHLORIDE 10 MG: 10 TABLET ORAL at 01:27

## 2019-09-17 RX ADMIN — OXYCODONE HYDROCHLORIDE 10 MG: 10 TABLET ORAL at 09:22

## 2019-09-17 RX ADMIN — OXYCODONE HYDROCHLORIDE 10 MG: 10 TABLET ORAL at 21:03

## 2019-09-17 RX ADMIN — ATORVASTATIN CALCIUM 20 MG: 20 TABLET, FILM COATED ORAL at 18:21

## 2019-09-17 RX ADMIN — OXYCODONE HYDROCHLORIDE 10 MG: 10 TABLET ORAL at 16:00

## 2019-09-17 RX ADMIN — VANCOMYCIN HYDROCHLORIDE 1500 MG: 500 INJECTION, POWDER, LYOPHILIZED, FOR SOLUTION INTRAVENOUS at 21:12

## 2019-09-17 RX ADMIN — CEFTRIAXONE SODIUM 2 G: 2 INJECTION, POWDER, FOR SOLUTION INTRAMUSCULAR; INTRAVENOUS at 05:51

## 2019-09-17 RX ADMIN — HYDROMORPHONE HYDROCHLORIDE 2 MG: 2 TABLET ORAL at 19:16

## 2019-09-17 RX ADMIN — HYDROMORPHONE HYDROCHLORIDE 2 MG: 2 TABLET ORAL at 10:22

## 2019-09-17 ASSESSMENT — ENCOUNTER SYMPTOMS
CONSTIPATION: 1
CHILLS: 0
VOMITING: 0
ABDOMINAL PAIN: 0
CONSTIPATION: 0
WEIGHT LOSS: 0
DIARRHEA: 0
COUGH: 0
PALPITATIONS: 0
FEVER: 0
NAUSEA: 0
SHORTNESS OF BREATH: 0
DIZZINESS: 0
HEADACHES: 0
MYALGIAS: 1
ORTHOPNEA: 0

## 2019-09-17 NOTE — PROGRESS NOTES
HOSPITAL MEDICINE PROGRESS NOTE    Date of service  9/16/2019    Chief Complaint  Leg Pain      Hospital Course:    54 yo man p/w left septic knee.  This was I&D on 9/10 by Dr. Coon.  Drain was removed 9/11.  Culture of the knee fluid finalized on September 14, 2018, negative for any organism.  Report from outside hospital of the fluid cultures also negative, Gram stain show gram-positive cocci.  Despite negative culture and wash out, clinically not improved, spiked fever, so ID consulted and added CFTX to vancomycin.       Interval History Updates:  Hospital Day #Hospital Day: 6   Febrile o/n; T 38.2 C.  OP note from Dr. Coon available today so I reviewed it.      Consultants/Specialty  Orthopaedics   ID    Review of Systems   Review of Systems   Constitutional: Positive for fever. Negative for chills.   Respiratory: Negative for shortness of breath.    Cardiovascular: Negative for chest pain, palpitations and leg swelling.   Gastrointestinal: Negative for abdominal pain, constipation, diarrhea, nausea and vomiting.   Musculoskeletal: Positive for joint pain and myalgias.        Patient report left knee feel slightly better today.  He was able to get up to the bathroom better than yesterday.     Skin: Negative for rash.   Neurological: Negative for focal weakness.   Endo/Heme/Allergies: Negative.    All other systems reviewed and are negative.       Medications:  • enoxaparin  40 mg DAILY   • acetaminophen  650 mg Q6HRS PRN   • vancomycin  13.5 mg/kg Q12HR   • cefTRIAXone (ROCEPHIN) IV  2 g Q24HRS   • HYDROmorphone  2 mg Q4HRS PRN   • atorvastatin  20 mg Q EVENING   • MD Alert...Vancomycin per Pharmacy   PHARMACY TO DOSE   • senna-docusate  2 Tab BID    And   • polyethylene glycol/lytes  1 Packet QDAY PRN    And   • magnesium hydroxide  30 mL QDAY PRN    And   • bisacodyl  10 mg QDAY PRN   • Pharmacy Consult Request  1 Each PHARMACY TO DOSE    And   • oxyCODONE immediate-release  5 mg Q3HRS PRN    And   •  oxyCODONE immediate-release  10 mg Q3HRS PRN    And   • morphine injection  4 mg Q3HRS PRN   • ondansetron  4 mg Q4HRS PRN   • ondansetron  4 mg Q4HRS PRN   • promethazine  12.5-25 mg Q4HRS PRN   • promethazine  12.5-25 mg Q4HRS PRN   • prochlorperazine  5-10 mg Q4HRS PRN       Physical Exam   Vitals:    09/15/19 2100 09/16/19 0100 09/16/19 0500 09/16/19 0840   BP:  107/69 111/71 107/66   Pulse:  75 90 92   Resp:  16 17 18   Temp: (!) 38.2 °C (100.8 °F) 36.8 °C (98.3 °F) 37.2 °C (99 °F) 36.7 °C (98.1 °F)   TempSrc: Oral Temporal Temporal Temporal   SpO2:  97% 97% 98%   Weight:       Height:           Physical Exam   Constitutional: He is oriented to person, place, and time and well-developed, well-nourished, and in no distress. No distress.   HENT:   Head: Normocephalic and atraumatic.   Eyes: Pupils are equal, round, and reactive to light. Conjunctivae are normal. No scleral icterus.   Neck: Normal range of motion. Neck supple. No JVD present.   Cardiovascular: Normal rate, regular rhythm and normal heart sounds. Exam reveals no gallop and no friction rub.   No murmur heard.  Pulmonary/Chest: Effort normal and breath sounds normal. No respiratory distress. He has no wheezes. He has no rales. He exhibits no tenderness.   Abdominal: Soft. Bowel sounds are normal. There is no tenderness. There is no rebound.   Musculoskeletal: He exhibits tenderness. He exhibits no edema or deformity.   Left knee incision C/D/I.  Staples intact.  Drain removed.  Moderate degree of erythema surrounding the left knee.  No drainage.  More swelling today on exam.   Neurological: He is alert and oriented to person, place, and time.   Skin: Skin is warm and dry. No rash noted. He is not diaphoretic. No erythema.   Psychiatric: Mood and affect normal.   Nursing note and vitals reviewed.       Recent Labs     09/14/19  0421 09/15/19  0226 09/16/19  0423   WBC 8.2 10.0 8.9   RBC 4.34* 4.67* 4.52*   HEMOGLOBIN 12.7* 13.6* 13.8*   HEMATOCRIT  39.8* 42.7 42.2   MCV 91.7 91.4 93.4   MCH 29.3 29.1 30.5   MCHC 31.9* 31.9* 32.7*   RDW 45.1 44.4 45.0   PLATELETCT 295 372 382   MPV 9.9 9.7 9.6      Laboratory   Recent Labs     09/14/19  0421 09/15/19  0226 09/16/19  0423   WBC 8.2 10.0 8.9   RBC 4.34* 4.67* 4.52*   HEMOGLOBIN 12.7* 13.6* 13.8*   HEMATOCRIT 39.8* 42.7 42.2   PLATELETCT 295 372 382     Recent Labs     09/14/19  0421 09/15/19  0226 09/16/19  0423   SODIUM 135 133* 133*   POTASSIUM 4.0 4.2 4.3   CHLORIDE 99 97 98   CO2 29 27 25   GLUCOSE 116* 124* 115*   BUN 15 13 18   CREATININE 0.94 0.94 1.01      Recent Labs     09/14/19  0421 09/15/19  0226 09/16/19  0423   GLUCOSE 116* 124* 115*           Lactic 2.1, 1.9, 3.4, 2.4, 3.0, 1.8, 1.7  ESR 64  CRP 20       Microbiology:   Gram stain of knee fluid:  Rare gram+ cocci  Culture finalized September 14, 2098, negative.    Report of synovial fluid culture from outside hospital negative.  Gram stain of the fluid show gram-positive cocci.     Labs reviewed by me and noted above.    Radiology  IR-US GUIDED PIV  Narrative: EXAMINATION:                                                                          HISTORY/REASON FOR EXAM:  Ultrasound Guided PIV.     TECHNIQUE/EXAM DESCRIPTION AND NUMBER OF VIEWS:  Peripheral IV insertion   with ultrasound guidance.  The procedure was prepared using maximal   sterile barrier technique including sterile gown, mask, cap, and donning   of sterile gloves following appropriate hand hygiene and/or sterile scrub.   Patient skin site was prepped with 2% Chlorhexidine solution.       FINDINGS: Peripheral IV insertion with Ultrasound Guidance was performed   by qualified imaging nursing staff without the assistance of a   Radiologist.  Impression:  Ultrasound-guided PERIPHERAL IV INSERTION performed by   qualified nursing staff as above.  US-EXTREMITY VENOUS LOWER UNILAT LEFT   Vascular Laboratory   CONCLUSIONS   No prior study is available for comparison.    Normal left lower  extremity superficial and deep venous examination.      URBAN GALVAN     Exam Date:     09/15/2019 08:40     Room #:     Inpatient     Priority:     Routine     Ht (in):             Wt (lb):     Ordering Physician:        MARIELOS OLIVARES     Referring Physician:       [id number]     Sonographer:               Lucila Wilson RVT, RDCS     Study Type:                Complete Unilateral     Technical Quality:         Good     Age:    53    Gender:     M     MRN:    [id number]     :    1965      BSA:     Indications:     Pain in Limb, Edema     CPT Codes:       45071     ICD Codes:       729.5  782.3     History:         Septic left knee with pain and swelling     Limitations:     PROCEDURES:   Left lower extremity venous duplex imaging.    The following venous structures were evaluated: common femoral, profunda    femoral, greater saphenous, femoral, popliteal , peroneal and posterior    tibial veins.    Serial compression, augmentation maneuvers,  color and spectral Doppler    flow evaluations were performed.      FINDINGS:   Left lower extremity -   Complete color filling and compressibility with normal venous flow dynamics    including spontaneous flow, response to augmentation maneuvers, and    respiratory phasicity.    No superficial or deep venous thrombosis.      Flow was evaluated in the contralateral common femoral vein and normal    venous flow dynamics including spontaneous flow, respiratory phasic    variation and augmentation were demonstrated.      Kwan Atkins MD   (Electronically Signed)   Final Date:      15 September 2019                     09:53              Assessment and Plan    Principal Problem:    Arthritis, septic (HCC) POA: Yes.  S/p I&D 9/10.  Both synovial fluid at the outside facility and collected during surgery here finalize negative for bacteria, although both of them had GS positive gram positive cocci.  Drain out .  US LLE neg  DVT 9/14.  On vancomycin (9/10 - ) and CFTX (9/14 - ) per ID.  Might need 2 wks IV abx then transition to oral abx.  Discussed with Dr. Da Silva.        Fever, ongoing (new) POA: No.  Spiked again this morning; 38.2 C.  I ordered blood culture x2 and placed order to reflex blood cultures if febrile again.  Dr. Da Silva is aware.  Ortho might need to re-explore the left knee if continues to spike fever.        Sepsis (new).  Resolved after knee wash out and started antibiotic, but now with new persistent fever.  Plan as above.      Lactic acidosis POA: Yes.  Resolving with aggressive IVF resuscitation.  Most likely due to septic arthitic.  Hemodynamics stable.  Treatment of underlying cause as above.      Acute hyperglycemia POA: Yes.  No h/o DM2.  Suspect due to septic knee/infection/sepsis physiology.  Patient decline monitor, so discontinued FSG monitor, ISS.  A1C is 6.0.      HLD (hyperlipidemia) POA: Yes.  Cont on Lipitor.      DVT prophylaxis: SCD, Lovenox    CODE STATUS:  FULL CODE    Disposition: To be determined.    Case was discussed with Primary RN and Charge Nurse, Medical SW, , and Pharmacist on IDTround in addition to individual(s) mentioned above.    I have performed a physical exam and reviewed and updated ROS as of today, 9/16/2019.  In review of yesterday's note dated, 9/15/2019, there are no changes except as documented above.      Liliya Terrell M.D.

## 2019-09-17 NOTE — PROGRESS NOTES
Assumed care at 1900    Received report from day shift RN.    Reviewed recent lab results, notes, orders, and MAR  POC discussed and updated on care board  Bed is in the lowest and locked position, call light within reach       (x2) at bedside   RA  +voiding   +BM  Tolerating diet  Denies n/v  Pain well controlled with medication per MAR  IV antibiotics   IV assessed to be infiltrated    Ultrasound guided IV placed by SICU Rapid RN   2nd ultrasound IV in 2 days, perhaps patient would benefit from midline.    Left knee is swollen, staples well approximated open to air.     Weight bearing as tolerated.    SBA with FWW

## 2019-09-17 NOTE — PROGRESS NOTES
Pharmacy Kinetics 53 y.o. male on vancomycin day # 7   2019    Currently on Vancomycin 1500 mg iv q12hr (0900 2100)     Provider specified end date: TBD, ID consulting      Indication for Treatment: SSTI, Septic joint     Pertinent history per medical record: Admitted on 9/10/2019 for Septic Joint.  52 yo man w left septic knee, I&D on 9/10 by Dr. Coon.  Drain was removed .  Culture of the knee fluid pending.         Other antibiotics: Rocephin 2 g iv q24hr      Allergies: Patient has no known allergies.      List concerns for renal function  obesity     Pertinent cultures to date:   09/10/19:PBCx2:NGTD  09/10/19:Lt Knee,WND:NGTD     MRSA nares swab if pneumonia is a concern (ordered/positive/negative/n-a): NA    Recent Labs     09/15/19  0226 19  0423 19  0435   WBC 10.0 8.9 8.4   NEUTSPOLYS 61.10 61.40 64.60     Recent Labs     09/15/19  0226 19  0423 19  0435   BUN 13 18 17   CREATININE 0.94 1.01 1.02     Recent Labs     19  0907   VANCOTROUGH 12.7       Intake/Output Summary (Last 24 hours) at 2019 1149  Last data filed at 2019 0100  Gross per 24 hour   Intake 720 ml   Output 700 ml   Net 20 ml      /73   Pulse 89   Temp 37.6 °C (99.6 °F) (Temporal)   Resp 16   Ht 1.829 m (6')   Wt 113.6 kg (250 lb 7.1 oz)   SpO2 97%  Temp (24hrs), Av.4 °C (99.3 °F), Min:37.2 °C (99 °F), Max:37.6 °C (99.6 °F)      A/P   1. Vancomycin dose change: No.   2. Next vancomycin level: ~ 2 days, renal function stable. Plan for     3. Goal trough:  12 - 16 mcg/mL   4. Comments: Renal indices stable. Trough level resulted yesterday within goal range at 12.7 mcg/mL. Continue current regimen. ID consulting - Plan for 4 weeks of IV abx from 9/10, Stop date 10/08. Repeat trough level in a couple days to monitor for accumulation.    Vera FinnD

## 2019-09-17 NOTE — CARE PLAN
Problem: Communication  Goal: The ability to communicate needs accurately and effectively will improve  Outcome: PROGRESSING AS EXPECTED   Participation in POC  Problem: Safety  Goal: Will remain free from injury  Outcome: PROGRESSING AS EXPECTED   Fall precautions in place  Problem: Infection  Goal: Will remain free from infection  Outcome: PROGRESSING AS EXPECTED   Patient is receiving IV antibiotics

## 2019-09-17 NOTE — PROGRESS NOTES
HOSPITAL MEDICINE PROGRESS NOTE    Date of service  9/17/2019    Chief Complaint  Leg Pain      Hospital Course:    52 yo man p/w left septic knee.  This was I&D on 9/10 by Dr. Coon.  Drain was removed 9/11.  Culture of the knee fluid finalized on September 14, 2018, negative for any organism.  Report from outside hospital of the fluid cultures also negative, Gram stain show gram-positive cocci.  Despite negative culture and wash out, clinically not improved, spiked fever, so ID consulted and added CFTX to vancomycin.       Interval History Updates:  C/O knee and limb pain - denies fever, night sweats, malaise  CDI  Small effusion  Doppler reviewed and neg for DVT  Tman 99.6  VSS  WBC normal  Na 132  Stable  Glu 150 - ok  Vanc and Ceftriax for SSTI/Septic joint; Cr OK  Stop date 10/8      Consultants/Specialty  Ortho  ID    Review of Systems   Review of Systems   Constitutional: Negative for chills, fever and weight loss.   Cardiovascular: Negative for chest pain, palpitations and orthopnea.   Gastrointestinal: Positive for constipation. Negative for abdominal pain, nausea and vomiting.   Genitourinary: Negative for dysuria and frequency.   Musculoskeletal: Positive for joint pain.   Skin: Negative for rash.   Neurological: Negative for dizziness.   All other systems reviewed and are negative.       Medications:  • enoxaparin  40 mg DAILY   • acetaminophen  650 mg Q6HRS PRN   • vancomycin  13.5 mg/kg Q12HR   • cefTRIAXone (ROCEPHIN) IV  2 g Q24HRS   • HYDROmorphone  2 mg Q4HRS PRN   • atorvastatin  20 mg Q EVENING   • MD Alert...Vancomycin per Pharmacy   PHARMACY TO DOSE   • senna-docusate  2 Tab BID    And   • polyethylene glycol/lytes  1 Packet QDAY PRN    And   • magnesium hydroxide  30 mL QDAY PRN    And   • bisacodyl  10 mg QDAY PRN   • Pharmacy Consult Request  1 Each PHARMACY TO DOSE    And   • oxyCODONE immediate-release  5 mg Q3HRS PRN    And   • oxyCODONE immediate-release  10 mg Q3HRS PRN    And   •  morphine injection  4 mg Q3HRS PRN   • ondansetron  4 mg Q4HRS PRN   • ondansetron  4 mg Q4HRS PRN   • promethazine  12.5-25 mg Q4HRS PRN   • promethazine  12.5-25 mg Q4HRS PRN   • prochlorperazine  5-10 mg Q4HRS PRN       Physical Exam   Vitals:    09/16/19 1650 09/16/19 2100 09/17/19 0500 09/17/19 0753   BP: 131/84 119/77 117/74 105/73   Pulse: 100 98 67 89   Resp: 18 17 17 16   Temp: 37.2 °C (99 °F) 37.6 °C (99.6 °F) 37.3 °C (99.1 °F) 37.6 °C (99.6 °F)   TempSrc: Temporal Temporal Temporal Temporal   SpO2: 96% 97%     Weight:       Height:           Physical Exam   Constitutional: He is oriented to person, place, and time and well-developed, well-nourished, and in no distress. No distress.   HENT:   Head: Normocephalic and atraumatic.   Eyes: Pupils are equal, round, and reactive to light. Conjunctivae are normal.   Neck: Neck supple.   Cardiovascular: Normal rate, regular rhythm and normal heart sounds. Exam reveals no friction rub.   No murmur heard.  Pulmonary/Chest: Effort normal and breath sounds normal. No respiratory distress. He has no wheezes. He has no rales.   Abdominal: Soft. Bowel sounds are normal. He exhibits no distension. There is no tenderness.   Musculoskeletal:   L knee CDI w staples   Neurological: He is alert and oriented to person, place, and time. No cranial nerve deficit. GCS score is 15.   Skin: Skin is warm and dry. He is not diaphoretic.        Recent Labs     09/15/19  0226 09/16/19  0423 09/17/19  0435   WBC 10.0 8.9 8.4   RBC 4.67* 4.52* 4.55*   HEMOGLOBIN 13.6* 13.8* 13.1*   HEMATOCRIT 42.7 42.2 40.9*   MCV 91.4 93.4 89.9   MCH 29.1 30.5 28.8   MCHC 31.9* 32.7* 32.0*   RDW 44.4 45.0 42.5   PLATELETCT 372 382 440   MPV 9.7 9.6 9.7      Laboratory   Recent Labs     09/15/19  0226 09/16/19  0423 09/17/19  0435   WBC 10.0 8.9 8.4   RBC 4.67* 4.52* 4.55*   HEMOGLOBIN 13.6* 13.8* 13.1*   HEMATOCRIT 42.7 42.2 40.9*   PLATELETCT 372 382 440     Recent Labs     09/15/19  0226 09/16/19  0423  19  0435   SODIUM 133* 133* 132*   POTASSIUM 4.2 4.3 3.9   CHLORIDE 97 98 96   CO2 27 25 26   GLUCOSE 124* 115* 150*   BUN 13 18 17   CREATININE 0.94 1.01 1.02      Recent Labs     09/15/19  0226 19  0423 19  0435   GLUCOSE 124* 115* 150*      Recent Labs     19  0435   INR 1.14*               Radiology  IR-US GUIDED PIV  Narrative: EXAMINATION:                                                                          HISTORY/REASON FOR EXAM:  Ultrasound Guided PIV.     TECHNIQUE/EXAM DESCRIPTION AND NUMBER OF VIEWS:  Peripheral IV insertion   with ultrasound guidance.  The procedure was prepared using maximal   sterile barrier technique including sterile gown, mask, cap, and donning   of sterile gloves following appropriate hand hygiene and/or sterile scrub.   Patient skin site was prepped with 2% Chlorhexidine solution.       FINDINGS: Peripheral IV insertion with Ultrasound Guidance was performed   by qualified imaging nursing staff without the assistance of a   Radiologist.  Impression:  Ultrasound-guided PERIPHERAL IV INSERTION performed by   qualified nursing staff as above.  US-EXTREMITY VENOUS LOWER UNILAT LEFT   Vascular Laboratory   CONCLUSIONS   No prior study is available for comparison.    Normal left lower extremity superficial and deep venous examination.      [facility]     Exam Date:     09/15/2019 08:40     Room #:     Inpatient     Priority:     Routine     Ht (in):             Wt (lb):     Ordering Physician:        MARIELOS OLIVARES     Referring Physician:       [id number]     Sonographer:               Lucila Wilson RVT, RDCS     Study Type:                Complete Unilateral     Technical Quality:         Good     Age:    53    Gender:     M     MRN:    [id number]     :    1965      BSA:     Indications:     Pain in Limb, Edema     CPT Codes:       57404     ICD Codes:       729.5  782.3     History:         Septic left knee  with pain and swelling     Limitations:     PROCEDURES:   Left lower extremity venous duplex imaging.    The following venous structures were evaluated: common femoral, profunda    femoral, greater saphenous, femoral, popliteal , peroneal and posterior    tibial veins.    Serial compression, augmentation maneuvers,  color and spectral Doppler    flow evaluations were performed.      FINDINGS:   Left lower extremity -   Complete color filling and compressibility with normal venous flow dynamics    including spontaneous flow, response to augmentation maneuvers, and    respiratory phasicity.    No superficial or deep venous thrombosis.      Flow was evaluated in the contralateral common femoral vein and normal    venous flow dynamics including spontaneous flow, respiratory phasic    variation and augmentation were demonstrated.      Kwan Atkins MD   (Electronically Signed)   Final Date:      15 September 2019                     09:53              Assessment and Plan    Principal Problem:    Arthritis, septic (HCC) POA: Yes.  S/p I&D 9/10.  Both synovial fluid at the outside facility and collected during surgery here finalize negative for bacteria, although both of them had GS positive gram positive cocci.  Drain out 9/12.  US LLE neg DVT 9/14.  On vancomycin (9/10 - ) and CFTX (9/14 - ) per ID.  Might need 2 wks IV abx then transition to oral abx.  Discussed with Dr. Da Silva.  9/17 Midline ordered as PIV cont to fail      Fever, ongoing (new) POA: No. No new fevers.  Ortho might need to re-explore the left knee if continues to spike fever.  Follow      Sepsis (new).  Resolved after knee wash out and started antibiotic, but now with new persistent fever.  Plan as above.      Lactic acidosis POA: Yes.  Resolving with aggressive IVF resuscitation.  Most likely due to septic arthitic.  Hemodynamics stable.  Treatment of underlying cause as above.      Acute hyperglycemia POA: Yes.  No h/o DM2.  Suspect due to  septic knee/infection/sepsis physiology.  Patient decline monitor, so discontinued FSG monitor, ISS.  A1C is 6.0.      HLD (hyperlipidemia) POA: Yes.  Cont on Lipitor.      DVT prophylaxis: SCD, Lovenox    CODE STATUS:  FULL CODE    Disposition: To be determined.    Case was discussed with Primary RN and Charge Nurse, Medical SW, , and Pharmacist on IDTround in addition to individual(s) mentioned above.    I have performed a physical exam and reviewed and updated ROS as of today, 9/17/2019.  In review of yesterday's note dated, 9/16/2019, there are no changes except as documented above.      DILLON Maldonado.

## 2019-09-17 NOTE — PROGRESS NOTES
Infectious Disease Progress Note    Author: Yumiko Da Silva M.D. Date & Time of service: 2019  11:39 AM    Chief Complaint:  FU Left septic knee     Interval History:   Tmax 100.8 WBC 8.9 Ongoing left knee pain. Unable to stand or ambulate. Tolerating IV abx without issues.   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 Ongoing left knee pain 8/10 in severity with pain meds.     Review of Systems:  Review of Systems   Constitutional: Negative for chills, fever and malaise/fatigue.   Respiratory: Negative for cough and shortness of breath.    Gastrointestinal: Negative for abdominal pain, constipation, diarrhea, nausea and vomiting.   Musculoskeletal: Positive for joint pain and myalgias.   Neurological: Negative for dizziness and headaches.   All other systems reviewed and are negative.      Hemodynamics:  Temp (24hrs), Av.4 °C (99.3 °F), Min:37.2 °C (99 °F), Max:37.6 °C (99.6 °F)  Temperature: 37.6 °C (99.6 °F)  Pulse  Av.9  Min: 63  Max: 104   Blood Pressure: 105/73       Physical Exam:  Physical Exam   Constitutional: He is oriented to person, place, and time. He appears well-developed and well-nourished.   HENT:   Head: Normocephalic and atraumatic.   Fair dentition   Eyes: Pupils are equal, round, and reactive to light. Conjunctivae and EOM are normal.   Cardiovascular: Normal rate, regular rhythm and normal heart sounds.   Pulmonary/Chest: Effort normal and breath sounds normal.   Abdominal: Soft. Bowel sounds are normal. He exhibits no distension. There is tenderness. There is no rebound and no guarding.   Musculoskeletal: He exhibits edema and tenderness.   Left knee surgical site with staples in place, well approximated. No erythema. No draingage       Neurological: He is alert and oriented to person, place, and time.   Skin: Skin is warm and dry.   Multiple tattoos   Psychiatric: He has a normal mood and affect. His behavior is normal.        Meds:    Current Facility-Administered Medications:   •  enoxaparin  •  acetaminophen  •  vancomycin  •  cefTRIAXone (ROCEPHIN) IV  •  HYDROmorphone  •  atorvastatin  •  MD Alert...Vancomycin per Pharmacy  •  senna-docusate **AND** polyethylene glycol/lytes **AND** magnesium hydroxide **AND** bisacodyl  •  Notify provider if pain remains uncontrolled **AND** Use the numeric rating scale (NRS-11) on regular floors and Critical-Care Pain Observation Tool (CPOT) on ICUs/Trauma to assess pain **AND** Pulse Ox (Oximetry) **AND** Pharmacy Consult Request **AND** If patient difficult to arouse and/or has respiratory depression, stop any opiates that are currently infusing and call a Rapid Response. **AND** oxyCODONE immediate-release **AND** oxyCODONE immediate-release **AND** morphine injection  •  ondansetron  •  ondansetron  •  promethazine  •  promethazine  •  prochlorperazine    Labs:  Recent Labs     09/15/19  0226 09/16/19  0423 09/17/19  0435   WBC 10.0 8.9 8.4   RBC 4.67* 4.52* 4.55*   HEMOGLOBIN 13.6* 13.8* 13.1*   HEMATOCRIT 42.7 42.2 40.9*   MCV 91.4 93.4 89.9   MCH 29.1 30.5 28.8   RDW 44.4 45.0 42.5   PLATELETCT 372 382 440   MPV 9.7 9.6 9.7   NEUTSPOLYS 61.10 61.40 64.60   LYMPHOCYTES 20.60* 16.00* 19.70*   MONOCYTES 14.60* 17.40* 10.30   EOSINOPHILS 2.10 3.10 3.20   BASOPHILS 0.50 0.70 0.50     Recent Labs     09/15/19  0226 09/16/19  0423 09/17/19  0435   SODIUM 133* 133* 132*   POTASSIUM 4.2 4.3 3.9   CHLORIDE 97 98 96   CO2 27 25 26   GLUCOSE 124* 115* 150*   BUN 13 18 17     Recent Labs     09/15/19  0226 09/16/19  0423 09/17/19  0435   CREATININE 0.94 1.01 1.02       Imaging:  Ir-us Guided Piv    Result Date: 9/15/2019  EXAMINATION:                                                                    HISTORY/REASON FOR EXAM:  Ultrasound Guided PIV.  TECHNIQUE/EXAM DESCRIPTION AND NUMBER OF VIEWS:  Peripheral IV insertion with ultrasound guidance.  The procedure was prepared using maximal sterile  barrier technique including sterile gown, mask, cap, and donning of sterile gloves following appropriate hand hygiene and/or sterile scrub. Patient skin site was prepped with 2% Chlorhexidine solution.   FINDINGS: Peripheral IV insertion with Ultrasound Guidance was performed by qualified imaging nursing staff without the assistance of a Radiologist.      Ultrasound-guided PERIPHERAL IV INSERTION performed by qualified nursing staff as above.     Us-extremity Venous Lower Unilat Left    Result Date: 9/15/2019   Vascular Laboratory  CONCLUSIONS  No prior study is available for comparison.  Normal left lower extremity superficial and deep venous examination.  [facility]  Exam Date:     09/15/2019 08:40  Room #:     Inpatient  Priority:     Routine  Ht (in):             Wt (lb):  Ordering Physician:        MARIELOS OLIVARES  Referring Physician:       [id number] Moore  Sonographer:               Lucila Wilson RVT, RDCS  Study Type:                Complete Unilateral  Technical Quality:         Good  Age:    53    Gender:     M  MRN:    [id number]  :    1965      BSA:  Indications:     Pain in Limb, Edema  CPT Codes:       04734  ICD Codes:       729.5  782.3  History:         Septic left knee with pain and swelling  Limitations:  PROCEDURES:  Left lower extremity venous duplex imaging.  The following venous structures were evaluated: common femoral, profunda  femoral, greater saphenous, femoral, popliteal , peroneal and posterior  tibial veins.  Serial compression, augmentation maneuvers,  color and spectral Doppler  flow evaluations were performed.  FINDINGS:  Left lower extremity -  Complete color filling and compressibility with normal venous flow dynamics  including spontaneous flow, response to augmentation maneuvers, and  respiratory phasicity.  No superficial or deep venous thrombosis.  Flow was evaluated in the contralateral common femoral vein and normal  venous flow  "dynamics including spontaneous flow, respiratory phasic  variation and augmentation were demonstrated.  Kwan Atkins MD  (Electronically Signed)  Final Date:      15 September 2019                   09:53      Micro:  Results     Procedure Component Value Units Date/Time    CULTURE WOUND W/ GRAM STAIN [[id number]] Collected:  09/10/19 2309    Order Status:  Completed Specimen:  Wound Updated:  09/17/19 0847     Significant Indicator NEG     Source WND     Site Left Knee     Culture Result No growth at 6 days.     Gram Stain Result Many WBCs.  Rare Gram positive cocci.      Narrative:       Surgery - swabs received    Anaerobic Culture [[id number]] Collected:  09/10/19 2309    Order Status:  Completed Specimen:  Wound Updated:  09/17/19 0847     Significant Indicator NEG     Source WND     Site Left Knee     Culture Result Culture in progress.    Narrative:       Surgery - swabs received    BLOOD CULTURE [[id number]] Collected:  09/16/19 1006    Order Status:  Completed Specimen:  Blood from Peripheral Updated:  09/17/19 0829     Significant Indicator NEG     Source BLD     Site PERIPHERAL     Culture Result No Growth  Note: Blood cultures are incubated for 5 days and  are monitored continuously.Positive blood cultures  are called to the RN and reported as soon as  they are identified.      Narrative:       Per Hospital Policy: Only change Specimen Src: to \"Line\" if  specified by physician order.  Right Hand    BLOOD CULTURE [[id number]] Collected:  09/16/19 1006    Order Status:  Completed Specimen:  Blood from Peripheral Updated:  09/17/19 0829     Significant Indicator NEG     Source BLD     Site PERIPHERAL     Culture Result No Growth  Note: Blood cultures are incubated for 5 days and  are monitored continuously.Positive blood cultures  are called to the RN and reported as soon as  they are identified.      Narrative:       Per Hospital Policy: Only change Specimen Src: to \"Line\" if  specified by physician " "order.  Left Hand    Blood Culture [[id number]]     Order Status:  Canceled Specimen:  Blood from Peripheral     Blood Culture [[id number]] Collected:  09/10/19 2225    Order Status:  Completed Specimen:  Blood from Peripheral Updated:  09/16/19 0100     Significant Indicator NEG     Source BLD     Site PERIPHERAL     Culture Result No growth after 5 days of incubation.    Narrative:       From different peripheral sites, if not done within the last  24 hours (Per Hospital Policy: Only change specimen source to  \"Line\" if specified by physician order)  Left AC    Blood Culture [[id number]] Collected:  09/10/19 2225    Order Status:  Completed Specimen:  Blood from Peripheral Updated:  09/16/19 0100     Significant Indicator NEG     Source BLD     Site PERIPHERAL     Culture Result No growth after 5 days of incubation.    Narrative:       From different peripheral sites, if not done within the last  24 hours (Per Hospital Policy: Only change specimen source to  \"Line\" if specified by physician order)  Left Hand    GRAM STAIN [[id number]] Collected:  09/10/19 2309    Order Status:  Completed Specimen:  Wound Updated:  09/11/19 0557     Significant Indicator .     Source WND     Site Left Knee     Gram Stain Result Many WBCs.  Rare Gram positive cocci.      Narrative:       Surgery - swabs received          Assessment:  Active Hospital Problems    Diagnosis   • *Septic arthritis of knee, left (HCC) [M00.9]       ASSESSMENT/PLAN:      Deanna Moe is a 53 y.o.  admitted 9/10/2019. Pt has no significant past medical history.  He is in the assisted system and states that approximately 1 month ago he \"bumped\" his knee.  Since that time he had slowly progressive edema, redness and pain.  Symptoms progressed until just prior to admit when he had severe pain edema and was unable to ambulate.  He was initially brought to an outside facility ([facility] in [place]) and per notes arthrocentesis revealed markedly elevated WBC " count and initial Gram stain with GPC's.  Per notes the outside facility arthrocentesis with WBC neutral count of 112,000.  He went to the OR on 9/10 for washout of the knee.     Left knee septic arthritis  Fever curve improving  No leukocytosis  Injury some weeks ago and progressed, no prior his of knee injury and native joint   Arthrocentesis at [facility] with WBC of 112,000 confirmed verbally and gram stain with rare GPCs, no growth on culture and final at 72 hours.  They have no remaining specimen.  S/p I&D with synovectomy on 9/10. A significant amount of purulent fluid seen in the joint and extensive  synovitis per OP note  OR cultures with GPCs and no growth (on abx prior)  Doppler US - neg for DVT  Microbiology holding cultures for 14 days  Continue IV vancomycin and ceftriaxone 2 g daily  Monitor renal function and vanco trough  VT 12.7 on 9/16  Plan for 4 weeks of IV abx from 9/10  Stop date 10/08/19    Ongoing fevers, improving  Tmax 99.6  Blood cultures 9/16 - NGTD  On abx above  If persistent, needs ortho re-evaluation for possible surgical intervention    I have performed a physical exam and reviewed and updated ROS and plan today 9/17/2019.  In review of yesterday's note 9/16/2019, there are no changes except as documented above.    Plan of care discussed with Reinaldo DELEON

## 2019-09-17 NOTE — PROGRESS NOTES
Pharmacy Kinetics 53 y.o. male on vancomycin day # 6   2019    Currently on Vancomycin 1500 mg iv q12hr (0900 2100)    Provider specified end date: TBD, ID consulting      Indication for Treatment: SSTI, Septic joint     Pertinent history per medical record: Admitted on 9/10/2019 for Septic Joint.  54 yo man w left septic knee, I&D on 9/10 by Dr. Coon.  Drain was removed .  Culture of the knee fluid pending.         Other antibiotics: Rocephin 2 g iv q24hr      Allergies: Patient has no known allergies.      List concerns for renal function  obesity     Pertinent cultures to date:   09/10/19:PBCx2:NGTD  09/10/19:Lt Knee,WND:NGTD     MRSA nares swab if pneumonia is a concern (ordered/positive/negative/n-a): NA    Recent Labs     19  0421 09/15/19  0226 19  0423   WBC 8.2 10.0 8.9   NEUTSPOLYS 54.80 61.10 61.40     Recent Labs     19  0421 09/15/19  0226 19  0423   BUN 15 13 18   CREATININE 0.94 0.94 1.01     Recent Labs     19  0421 19  0907   VANCOTROUGH 17.3 12.7       Intake/Output Summary (Last 24 hours) at 2019 1707  Last data filed at 2019 0840  Gross per 24 hour   Intake 480 ml   Output 1100 ml   Net -620 ml      /66   Pulse 92   Temp 36.7 °C (98.1 °F) (Temporal)   Resp 18   Ht 1.829 m (6')   Wt 113.6 kg (250 lb 7.1 oz)   SpO2 98%  Temp (24hrs), Av.4 °C (99.3 °F), Min:36.7 °C (98.1 °F), Max:38.2 °C (100.8 °F)      A/P   1. Vancomycin dose change: No.   2. Next vancomycin level: ~ 2 days   3. Goal trough:  12 - 16 mcg/mL   4. Comments: SCr with mild trend upward, CTM while on vancomycin. Trough level resulted today within goal range at 12.7 mcg/mL, very close to true trough level drawn just 30 minutes late. Continue current regimen. ID consulting, continue current coverage for now. Repeat trough level in a couple days if Vancomycin continues to monitor for accumulation.     Nabila Domingo, VeraD

## 2019-09-18 ENCOUNTER — APPOINTMENT (OUTPATIENT)
Dept: RADIOLOGY | Facility: MEDICAL CENTER | Age: 54
DRG: 485 | End: 2019-09-18
Attending: NURSE PRACTITIONER
Payer: COMMERCIAL

## 2019-09-18 LAB
ANION GAP SERPL CALC-SCNC: 9 MMOL/L (ref 0–11.9)
BUN SERPL-MCNC: 13 MG/DL (ref 8–22)
CALCIUM SERPL-MCNC: 8.9 MG/DL (ref 8.5–10.5)
CHLORIDE SERPL-SCNC: 99 MMOL/L (ref 96–112)
CO2 SERPL-SCNC: 24 MMOL/L (ref 20–33)
CREAT SERPL-MCNC: 0.85 MG/DL (ref 0.5–1.4)
ERYTHROCYTE [DISTWIDTH] IN BLOOD BY AUTOMATED COUNT: 43.4 FL (ref 35.9–50)
GLUCOSE SERPL-MCNC: 159 MG/DL (ref 65–99)
HCT VFR BLD AUTO: 41.1 % (ref 42–52)
HGB BLD-MCNC: 13.6 G/DL (ref 14–18)
MCH RBC QN AUTO: 30.1 PG (ref 27–33)
MCHC RBC AUTO-ENTMCNC: 33.1 G/DL (ref 33.7–35.3)
MCV RBC AUTO: 90.9 FL (ref 81.4–97.8)
PLATELET # BLD AUTO: 418 K/UL (ref 164–446)
PMV BLD AUTO: 10 FL (ref 9–12.9)
POTASSIUM SERPL-SCNC: 4.1 MMOL/L (ref 3.6–5.5)
RBC # BLD AUTO: 4.52 M/UL (ref 4.7–6.1)
SODIUM SERPL-SCNC: 132 MMOL/L (ref 135–145)
WBC # BLD AUTO: 8.3 K/UL (ref 4.8–10.8)

## 2019-09-18 PROCEDURE — 770001 HCHG ROOM/CARE - MED/SURG/GYN PRIV*

## 2019-09-18 PROCEDURE — 85027 COMPLETE CBC AUTOMATED: CPT

## 2019-09-18 PROCEDURE — A9270 NON-COVERED ITEM OR SERVICE: HCPCS | Performed by: HOSPITALIST

## 2019-09-18 PROCEDURE — A9270 NON-COVERED ITEM OR SERVICE: HCPCS | Performed by: INTERNAL MEDICINE

## 2019-09-18 PROCEDURE — 700102 HCHG RX REV CODE 250 W/ 637 OVERRIDE(OP): Performed by: INTERNAL MEDICINE

## 2019-09-18 PROCEDURE — 700111 HCHG RX REV CODE 636 W/ 250 OVERRIDE (IP): Performed by: INTERNAL MEDICINE

## 2019-09-18 PROCEDURE — 700105 HCHG RX REV CODE 258: Performed by: INTERNAL MEDICINE

## 2019-09-18 PROCEDURE — 80048 BASIC METABOLIC PNL TOTAL CA: CPT

## 2019-09-18 PROCEDURE — C1751 CATH, INF, PER/CENT/MIDLINE: HCPCS

## 2019-09-18 PROCEDURE — 05HY33Z INSERTION OF INFUSION DEVICE INTO UPPER VEIN, PERCUTANEOUS APPROACH: ICD-10-PCS | Performed by: HOSPITALIST

## 2019-09-18 PROCEDURE — 99232 SBSQ HOSP IP/OBS MODERATE 35: CPT | Performed by: HOSPITALIST

## 2019-09-18 PROCEDURE — 36415 COLL VENOUS BLD VENIPUNCTURE: CPT

## 2019-09-18 PROCEDURE — 99232 SBSQ HOSP IP/OBS MODERATE 35: CPT | Performed by: INTERNAL MEDICINE

## 2019-09-18 PROCEDURE — 700102 HCHG RX REV CODE 250 W/ 637 OVERRIDE(OP): Performed by: HOSPITALIST

## 2019-09-18 RX ORDER — BACLOFEN 10 MG/1
10 TABLET ORAL 3 TIMES DAILY PRN
Status: DISCONTINUED | OUTPATIENT
Start: 2019-09-18 | End: 2019-09-25 | Stop reason: HOSPADM

## 2019-09-18 RX ADMIN — HYDROMORPHONE HYDROCHLORIDE 2 MG: 2 TABLET ORAL at 02:57

## 2019-09-18 RX ADMIN — OXYCODONE HYDROCHLORIDE 10 MG: 10 TABLET ORAL at 22:02

## 2019-09-18 RX ADMIN — OXYCODONE HYDROCHLORIDE 10 MG: 10 TABLET ORAL at 09:21

## 2019-09-18 RX ADMIN — ENOXAPARIN SODIUM 40 MG: 100 INJECTION SUBCUTANEOUS at 05:03

## 2019-09-18 RX ADMIN — CEFTRIAXONE SODIUM 2 G: 2 INJECTION, POWDER, FOR SOLUTION INTRAMUSCULAR; INTRAVENOUS at 05:02

## 2019-09-18 RX ADMIN — HYDROMORPHONE HYDROCHLORIDE 2 MG: 2 TABLET ORAL at 20:06

## 2019-09-18 RX ADMIN — HYDROMORPHONE HYDROCHLORIDE 2 MG: 2 TABLET ORAL at 14:19

## 2019-09-18 RX ADMIN — OXYCODONE HYDROCHLORIDE 10 MG: 10 TABLET ORAL at 18:36

## 2019-09-18 RX ADMIN — HYDROMORPHONE HYDROCHLORIDE 2 MG: 2 TABLET ORAL at 07:23

## 2019-09-18 RX ADMIN — VANCOMYCIN HYDROCHLORIDE 1500 MG: 500 INJECTION, POWDER, LYOPHILIZED, FOR SOLUTION INTRAVENOUS at 09:21

## 2019-09-18 RX ADMIN — ATORVASTATIN CALCIUM 20 MG: 20 TABLET, FILM COATED ORAL at 18:36

## 2019-09-18 RX ADMIN — OXYCODONE HYDROCHLORIDE 10 MG: 10 TABLET ORAL at 05:04

## 2019-09-18 RX ADMIN — SENNOSIDES, DOCUSATE SODIUM 2 TABLET: 50; 8.6 TABLET, FILM COATED ORAL at 05:04

## 2019-09-18 RX ADMIN — VANCOMYCIN HYDROCHLORIDE 1500 MG: 500 INJECTION, POWDER, LYOPHILIZED, FOR SOLUTION INTRAVENOUS at 21:55

## 2019-09-18 ASSESSMENT — ENCOUNTER SYMPTOMS
BACK PAIN: 0
ORTHOPNEA: 0
VOMITING: 0
MYALGIAS: 1
WEIGHT LOSS: 0
CLAUDICATION: 0
CONSTIPATION: 0
CHILLS: 0
COUGH: 0
HEADACHES: 0
ABDOMINAL PAIN: 0
HEARTBURN: 0
DIARRHEA: 0
NAUSEA: 0
SHORTNESS OF BREATH: 0
NECK PAIN: 1
DIZZINESS: 0
FEVER: 0
DIAPHORESIS: 0
PALPITATIONS: 0

## 2019-09-18 NOTE — CARE PLAN
Problem: Infection  Goal: Will remain free from infection  Outcome: PROGRESSING AS EXPECTED  Pt reinforced education on s/s of infection and how to reduce risks, patient verbalized understanding. Pt afebrile at this time, VSS, on antibiotics therapy.     Problem: Discharge Barriers/Planning  Goal: Patient's continuum of care needs will be met  Outcome: PROGRESSING AS EXPECTED   Pt updated on POC, informed of midline placement likely tomorrow, educated on need for antibiotics for infection, pt denies further needs, questions addressed.

## 2019-09-18 NOTE — PROGRESS NOTES
Pharmacy Kinetics 53 y.o. male on vancomycin day # 8   2019    Currently on Vancomycin 1500 mg iv q12hr (0900, 2100)     Provider specified end date: TBD, ID consulting      Indication for Treatment: SSTI, Septic joint     Pertinent history per medical record: Admitted on 9/10/2019 for Septic Joint.  54 yo man w left septic knee, I&D on 9/10 by Dr. Coon.  Drain was removed .  Culture of the knee fluid pending.         Other antibiotics: Rocephin 2 g iv q24hr      Allergies: Patient has no known allergies.      List concerns for renal function  obesity     Pertinent cultures to date:   09/10/19:PBCx2:NGTD  09/10/19:Lt Knee,WND:NGTD     MRSA nares swab if pneumonia is a concern (ordered/positive/negative/n-a): NA    Recent Labs     19  0423 19  0435 19  0929   WBC 8.9 8.4 8.3   NEUTSPOLYS 61.40 64.60  --      Recent Labs     19  0423 19  0435 19  0929   BUN 18 17 13   CREATININE 1.01 1.02 0.85     Recent Labs     19  0907   VANCOTROUGH 12.7       Intake/Output Summary (Last 24 hours) at 2019 1605  Last data filed at 2019 1500  Gross per 24 hour   Intake 50 ml   Output 2100 ml   Net -2050 ml      /74   Pulse 89   Temp 37 °C (98.6 °F) (Temporal)   Resp 17   Ht 1.829 m (6')   Wt 113.6 kg (250 lb 7.1 oz)   SpO2 90%  Temp (24hrs), Av.3 °C (99.2 °F), Min:37 °C (98.6 °F), Max:37.7 °C (99.8 °F)      A/P   1. Vancomycin dose change: No.   2. Next vancomycin level:  at 0830   3. Goal trough: 12 - 16 mcg/mL   4. Comments: Renal indices stable, SCr trending downward today. First trough level resulted within goal range at 12.7 mcg/mL. ID consulting - Plan for 4 weeks of IV abx from 9/10, Stop date 10/08. Repeat trough level tomorrow to monitor for accumulation as vancomycin therapy continues.     Vera FinnD

## 2019-09-18 NOTE — PROGRESS NOTES
Hospital Medicine Daily Progress Note    Date of Service  9/18/2019    Chief Complaint  53 y.o. male admitted 9/10/2019 with lactic acidosis, sepsis septic left knee    Hospital Course    54 yo man w left septic knee.  This was I&D on 9/10 by Dr. Coon.  Drain was removed 9/11.  Culture of the knee fluid finalized on September 14, 2018, negative for any organism.  Report from outside hospital of the fluid cultures also negative, Gram stain show gram-positive cocci.  Despite negative culture and wash out, clinically not improved, spiked fever, so ID consulted and added CFTX to vancomycin.      Interval Problem Update  Patient complained of muscle spasms.  Pain is controlled otherwise.  No fevers, chills, nausea, vomiting or other medical events overnight.  Last bowel movement yesterday  Voiding without difficulty  Tolerating p.o. diet  Requires 4 weeks of IV antibiotics with a stop date of 10/8  Currently receiving vancomycin and ceftriaxone    Consultants/Specialty  Infectious disease  Orthopedics    Code Status  Full    Disposition  To be determined    Review of Systems  Review of Systems   Constitutional: Negative for chills, diaphoresis, fever, malaise/fatigue and weight loss.   Cardiovascular: Negative for chest pain, palpitations, orthopnea and claudication.   Gastrointestinal: Negative for abdominal pain, constipation, diarrhea, heartburn, nausea and vomiting.   Genitourinary: Negative for dysuria and urgency.   Musculoskeletal: Positive for joint pain, myalgias and neck pain. Negative for back pain.   Skin: Negative for rash.   All other systems reviewed and are negative.       Physical Exam  Temp:  [37 °C (98.6 °F)-37.7 °C (99.8 °F)] 37 °C (98.6 °F)  Pulse:  [] 89  Resp:  [16-18] 17  BP: (118-133)/(67-75) 118/74  SpO2:  [90 %-97 %] 90 %    Physical Exam   Constitutional: He is oriented to person, place, and time. He appears well-developed and well-nourished. No distress.   HENT:   Head: Normocephalic  and atraumatic.   Eyes: Pupils are equal, round, and reactive to light. EOM are normal.   Neck: Neck supple.   Cardiovascular: Normal rate, regular rhythm and normal heart sounds.   Pulmonary/Chest: Effort normal and breath sounds normal. No respiratory distress. He has no wheezes. He has no rales.   Abdominal: Soft. He exhibits no distension. There is no tenderness.   Musculoskeletal:   Left knee CDI   Neurological: He is alert and oriented to person, place, and time. No cranial nerve deficit.   Skin: Skin is warm and dry.       Fluids    Intake/Output Summary (Last 24 hours) at 9/18/2019 1419  Last data filed at 9/18/2019 0500  Gross per 24 hour   Intake 50 ml   Output 900 ml   Net -850 ml       Laboratory  Recent Labs     09/16/19 0423 09/17/19  0435 09/18/19  0929   WBC 8.9 8.4 8.3   RBC 4.52* 4.55* 4.52*   HEMOGLOBIN 13.8* 13.1* 13.6*   HEMATOCRIT 42.2 40.9* 41.1*   MCV 93.4 89.9 90.9   MCH 30.5 28.8 30.1   MCHC 32.7* 32.0* 33.1*   RDW 45.0 42.5 43.4   PLATELETCT 382 440 418   MPV 9.6 9.7 10.0     Recent Labs     09/16/19 0423 09/17/19  0435 09/18/19  0929   SODIUM 133* 132* 132*   POTASSIUM 4.3 3.9 4.1   CHLORIDE 98 96 99   CO2 25 26 24   GLUCOSE 115* 150* 159*   BUN 18 17 13   CREATININE 1.01 1.02 0.85   CALCIUM 9.1 8.9 8.9     Recent Labs     09/17/19  0435   INR 1.14*               Imaging  IR-MIDLINE CATHETER INSERTION WO GUIDANCE > AGE 3   Final Result                  Ultrasound-guided midline placement performed by qualified nursing staff    as above.          IR-US GUIDED PIV   Final Result    Ultrasound-guided PERIPHERAL IV INSERTION performed by    qualified nursing staff as above.            US-EXTREMITY VENOUS LOWER UNILAT LEFT   Final Result      US-FOREIGN FILM ULTRASOUND   Final Result           Assessment/Plan  * Septic arthritis of knee, left (HCC)- (present on admission)  Assessment & Plan  Left knee septic arthritis with greater than 100,000 WBC on arthrocentesis   Status post I&D 9/10 by  Dr. Coon  IV abx per infectious disease--ceftriaxone added to vancomycin after patient respect fevers  Stop date 10/8  Pt/ot pain control --added baclofen 9/18  Need to follow arthrocentesis cultures at OSH  Labs q. 48  Appreciate ID assistance    HLD (hyperlipidemia)- (present on admission)  Assessment & Plan  Resume home medications when appropriate    Obesity- (present on admission)  Assessment & Plan  Encouraged weight loss       VTE prophylaxis: Enoxaparin    Patient examined no diagnostic tests reviewed.  There is no change to the review of symptoms and plan of care or physical exam except as noted and/or dated compared to the prior note.

## 2019-09-18 NOTE — PROGRESS NOTES
Infectious Disease Progress Note    Author: Yumiko Da Silva M.D. Date & Time of service: 2019  1:33 PM    Chief Complaint:  FU Left septic knee     Interval History:   Tmax 100.8 WBC 8.9 Ongoing left knee pain. Unable to stand or ambulate. Tolerating IV abx without issues.   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 Ongoing left knee pain 8/10 in severity with pain meds.    T-max 99.8 WBC 8.3 patient is doing well without any new issues to report.  PICC line placed today    Review of Systems:  Review of Systems   Constitutional: Negative for chills, fever and malaise/fatigue.   Respiratory: Negative for cough and shortness of breath.    Gastrointestinal: Negative for abdominal pain, constipation, diarrhea, nausea and vomiting.   Musculoskeletal: Positive for joint pain.        Left knee   Neurological: Negative for dizziness and headaches.   All other systems reviewed and are negative.      Hemodynamics:  Temp (24hrs), Av.3 °C (99.2 °F), Min:37 °C (98.6 °F), Max:37.7 °C (99.8 °F)  Temperature: (Q8 per RN)  Pulse  Av.7  Min: 63  Max: 106   Blood Pressure: 118/74       Physical Exam:  Physical Exam   Constitutional: He is oriented to person, place, and time. He appears well-developed and well-nourished.   HENT:   Head: Normocephalic and atraumatic.   Fair dentition   Eyes: Pupils are equal, round, and reactive to light. Conjunctivae and EOM are normal.   Cardiovascular: Normal rate, regular rhythm and normal heart sounds.   Pulmonary/Chest: Effort normal and breath sounds normal.   Abdominal: Soft. Bowel sounds are normal. He exhibits no distension. There is tenderness. There is no rebound and no guarding.   Musculoskeletal: He exhibits edema and tenderness.   Left knee surgical site with staples in place, well approximated. No erythema. No draingage    Right upper extremity midline-nontender, no surrounding erythema   Neurological: He is alert  and oriented to person, place, and time.   Skin: Skin is warm and dry.   Multiple tattoos   Psychiatric: He has a normal mood and affect. His behavior is normal.       Meds:    Current Facility-Administered Medications:   •  enoxaparin  •  acetaminophen  •  vancomycin  •  cefTRIAXone (ROCEPHIN) IV  •  HYDROmorphone  •  atorvastatin  •  MD Alert...Vancomycin per Pharmacy  •  senna-docusate **AND** polyethylene glycol/lytes **AND** magnesium hydroxide **AND** bisacodyl  •  Notify provider if pain remains uncontrolled **AND** Use the numeric rating scale (NRS-11) on regular floors and Critical-Care Pain Observation Tool (CPOT) on ICUs/Trauma to assess pain **AND** Pulse Ox (Oximetry) **AND** Pharmacy Consult Request **AND** If patient difficult to arouse and/or has respiratory depression, stop any opiates that are currently infusing and call a Rapid Response. **AND** oxyCODONE immediate-release **AND** oxyCODONE immediate-release **AND** morphine injection  •  ondansetron  •  ondansetron  •  promethazine  •  promethazine  •  prochlorperazine    Labs:  Recent Labs     09/16/19 0423 09/17/19 0435 09/18/19  0929   WBC 8.9 8.4 8.3   RBC 4.52* 4.55* 4.52*   HEMOGLOBIN 13.8* 13.1* 13.6*   HEMATOCRIT 42.2 40.9* 41.1*   MCV 93.4 89.9 90.9   MCH 30.5 28.8 30.1   RDW 45.0 42.5 43.4   PLATELETCT 382 440 418   MPV 9.6 9.7 10.0   NEUTSPOLYS 61.40 64.60  --    LYMPHOCYTES 16.00* 19.70*  --    MONOCYTES 17.40* 10.30  --    EOSINOPHILS 3.10 3.20  --    BASOPHILS 0.70 0.50  --      Recent Labs     09/16/19 0423 09/17/19 0435 09/18/19  0929   SODIUM 133* 132* 132*   POTASSIUM 4.3 3.9 4.1   CHLORIDE 98 96 99   CO2 25 26 24   GLUCOSE 115* 150* 159*   BUN 18 17 13     Recent Labs     09/16/19 0423 09/17/19  0435 09/18/19  0929   CREATININE 1.01 1.02 0.85       Imaging:  Ir-us Guided Piv    Result Date: 9/15/2019  EXAMINATION:                                                                    HISTORY/REASON FOR EXAM:  Ultrasound Guided  PIV.  TECHNIQUE/EXAM DESCRIPTION AND NUMBER OF VIEWS:  Peripheral IV insertion with ultrasound guidance.  The procedure was prepared using maximal sterile barrier technique including sterile gown, mask, cap, and donning of sterile gloves following appropriate hand hygiene and/or sterile scrub. Patient skin site was prepped with 2% Chlorhexidine solution.   FINDINGS: Peripheral IV insertion with Ultrasound Guidance was performed by qualified imaging nursing staff without the assistance of a Radiologist.      Ultrasound-guided PERIPHERAL IV INSERTION performed by qualified nursing staff as above.     Us-extremity Venous Lower Unilat Left    Result Date: 9/15/2019   Vascular Laboratory  CONCLUSIONS  No prior study is available for comparison.  Normal left lower extremity superficial and deep venous examination.  [facility]  Exam Date:     09/15/2019 08:40  Room #:     Inpatient  Priority:     Routine  Ht (in):             Wt (lb):  Ordering Physician:        MARIELOS OLIVARES  Referring Physician:       [id number]  Sonographer:               Lucila Wilson RVT, RDCS  Study Type:                Complete Unilateral  Technical Quality:         Good  Age:    53    Gender:     M  MRN:    [id number]  :    1965      BSA:  Indications:     Pain in Limb, Edema  CPT Codes:       52077  ICD Codes:       729.5  782.3  History:         Septic left knee with pain and swelling  Limitations:  PROCEDURES:  Left lower extremity venous duplex imaging.  The following venous structures were evaluated: common femoral, profunda  femoral, greater saphenous, femoral, popliteal , peroneal and posterior  tibial veins.  Serial compression, augmentation maneuvers,  color and spectral Doppler  flow evaluations were performed.  FINDINGS:  Left lower extremity -  Complete color filling and compressibility with normal venous flow dynamics  including spontaneous flow, response to augmentation maneuvers, and   "respiratory phasicity.  No superficial or deep venous thrombosis.  Flow was evaluated in the contralateral common femoral vein and normal  venous flow dynamics including spontaneous flow, respiratory phasic  variation and augmentation were demonstrated.  Kwan Atkins MD  (Electronically Signed)  Final Date:      15 September 2019                   09:53      Micro:  Results     Procedure Component Value Units Date/Time    CULTURE WOUND W/ GRAM STAIN [[id number]] Collected:  09/10/19 2309    Order Status:  Completed Specimen:  Wound Updated:  09/18/19 0834     Significant Indicator NEG     Source WND     Site Left Knee     Culture Result No growth at 7 days.     Gram Stain Result Many WBCs.  Rare Gram positive cocci.      Narrative:       Surgery - swabs received    Anaerobic Culture [[id number]] Collected:  09/10/19 2309    Order Status:  Completed Specimen:  Wound Updated:  09/18/19 0834     Significant Indicator NEG     Source WND     Site Left Knee     Culture Result Culture in progress.    Narrative:       Surgery - swabs received    BLOOD CULTURE [[id number]] Collected:  09/16/19 1006    Order Status:  Completed Specimen:  Blood from Peripheral Updated:  09/17/19 0829     Significant Indicator NEG     Source BLD     Site PERIPHERAL     Culture Result No Growth  Note: Blood cultures are incubated for 5 days and  are monitored continuously.Positive blood cultures  are called to the RN and reported as soon as  they are identified.      Narrative:       Per Hospital Policy: Only change Specimen Src: to \"Line\" if  specified by physician order.  Right Hand    BLOOD CULTURE [[id number]] Collected:  09/16/19 1006    Order Status:  Completed Specimen:  Blood from Peripheral Updated:  09/17/19 0829     Significant Indicator NEG     Source BLD     Site PERIPHERAL     Culture Result No Growth  Note: Blood cultures are incubated for 5 days and  are monitored continuously.Positive blood cultures  are called to the RN and " "reported as soon as  they are identified.      Narrative:       Per Hospital Policy: Only change Specimen Src: to \"Line\" if  specified by physician order.  Left Hand    Blood Culture [[id number]]     Order Status:  Canceled Specimen:  Blood from Peripheral     Blood Culture [[id number]] Collected:  09/10/19 2225    Order Status:  Completed Specimen:  Blood from Peripheral Updated:  09/16/19 0100     Significant Indicator NEG     Source BLD     Site PERIPHERAL     Culture Result No growth after 5 days of incubation.    Narrative:       From different peripheral sites, if not done within the last  24 hours (Per Hospital Policy: Only change specimen source to  \"Line\" if specified by physician order)  Left AC    Blood Culture [[id number]] Collected:  09/10/19 2225    Order Status:  Completed Specimen:  Blood from Peripheral Updated:  09/16/19 0100     Significant Indicator NEG     Source BLD     Site PERIPHERAL     Culture Result No growth after 5 days of incubation.    Narrative:       From different peripheral sites, if not done within the last  24 hours (Per Hospital Policy: Only change specimen source to  \"Line\" if specified by physician order)  Left Hand          Assessment:  Active Hospital Problems    Diagnosis   • *Septic arthritis of knee, left (HCC) [M00.9]       ASSESSMENT/PLAN:      Deanna Moe is a 53 y.o.  admitted 9/10/2019. Pt has no significant past medical history.  He is in the [facility] system and states that approximately 1 month ago he \"bumped\" his knee.  Since that time he had slowly progressive edema, redness and pain.  Symptoms progressed until just prior to admit when he had severe pain edema and was unable to ambulate.  He was initially brought to an outside facility ([facility] in [place]) and per notes arthrocentesis revealed markedly elevated WBC count and initial Gram stain with GPC's.  Per notes the outside facility arthrocentesis with WBC neutral count of 112,000.  He went to the OR on " 9/10 for washout of the knee.     Left knee septic arthritis  Fever curve improving  No leukocytosis  Injury some weeks ago and progressed, no prior his of knee injury and native joint   Arthrocentesis at [facility] with WBC of 112,000 confirmed verbally and gram stain with rare GPCs, no growth on culture and final at 72 hours.  They have no remaining specimen.  S/p I&D with synovectomy on 9/10. A significant amount of purulent fluid seen in the joint and extensive  synovitis per OP note  OR cultures with GPCs and no growth (on abx prior)  Doppler US - neg for DVT  Microbiology holding cultures for 14 days  Continue IV vancomycin and ceftriaxone 2 g daily  Monitor renal function and vanco trough  VT 12.7 on 9/16  Plan for 4 weeks of IV abx from 9/10  Stop date 10/08/19    Ongoing fevers, improving overall  Blood cultures 9/16 - NGTD  On abx above  If persistent, needs ortho re-evaluation for possible surgical intervention    I have performed a physical exam and reviewed and updated ROS and plan today 9/18/2019.  In review of yesterday's note 9/17/2019, there are no changes except as documented above.    Plan of care discussed with Reianldo DELEON

## 2019-09-18 NOTE — CARE PLAN
Problem: Safety  Goal: Will remain free from injury  Outcome: PROGRESSING AS EXPECTED  Goal: Will remain free from falls  Outcome: PROGRESSING AS EXPECTED     Problem: Infection  Goal: Will remain free from infection  Outcome: PROGRESSING AS EXPECTED     Problem: Discharge Barriers/Planning  Goal: Patient's continuum of care needs will be met  Outcome: PROGRESSING AS EXPECTED

## 2019-09-18 NOTE — PROCEDURES
Vascular Access Team    Date of Insertion: 9/18/19  Arm Circumference: 37  Internal length: 17  External Length: 0  Vein Occupancy %: 35  Reason for Midline: Antibiotic therapy  Labs: WBC 8.4, , BUN 17, Cr 1.02, GFR >60, INR n/a    Orders confirmed, vessel patency confirmed with ultrasound. Risks and benefits of procedure explained to patient and education regarding line associated bloodstream infections provided. Questions answered.     Power Midline placed in RUE per licensed provider order with ultrasound guidance. 4 Fr, single lumen Power Midline placed in basilic vein after 1 attempt(s). 2 mL of 1% lidocaine injected intradermally, 21 gauge microintroducer needle and modified Seldinger technique used. 17 cm catheter inserted with good blood return. Secured at 0 cm marker. Each lumen flushed without resistance with 10 mL 0.9% normal saline. Midline secured with Biopatch and Tegaderm.     Midline placement is confirmed by nurse using ultrasound and ability to flush and draw blood. Midline is appropriate for use at this time.  No X-ray is needed for placement confirmation. Pt tolerated procedure well.  Patient condition relayed to unit RN or ordering physician via this post procedure note in the EMR.     Ultrasound images uploaded to PACS and viewable in the EMR - yes  Ultrasound imaged printed and placed in paper chart - no     BARD Power Midline ref # [id number], Lot # [id number]

## 2019-09-19 ENCOUNTER — APPOINTMENT (OUTPATIENT)
Dept: RADIOLOGY | Facility: MEDICAL CENTER | Age: 54
DRG: 485 | End: 2019-09-19
Attending: NURSE PRACTITIONER
Payer: COMMERCIAL

## 2019-09-19 LAB
ANION GAP SERPL CALC-SCNC: 10 MMOL/L (ref 0–11.9)
BUN SERPL-MCNC: 13 MG/DL (ref 8–22)
CALCIUM SERPL-MCNC: 8.8 MG/DL (ref 8.5–10.5)
CHLORIDE SERPL-SCNC: 97 MMOL/L (ref 96–112)
CO2 SERPL-SCNC: 25 MMOL/L (ref 20–33)
CREAT SERPL-MCNC: 0.96 MG/DL (ref 0.5–1.4)
ERYTHROCYTE [DISTWIDTH] IN BLOOD BY AUTOMATED COUNT: 42.8 FL (ref 35.9–50)
GLUCOSE SERPL-MCNC: 125 MG/DL (ref 65–99)
HCT VFR BLD AUTO: 37.9 % (ref 42–52)
HGB BLD-MCNC: 12.7 G/DL (ref 14–18)
MAGNESIUM SERPL-MCNC: 2.3 MG/DL (ref 1.5–2.5)
MCH RBC QN AUTO: 30.2 PG (ref 27–33)
MCHC RBC AUTO-ENTMCNC: 33.5 G/DL (ref 33.7–35.3)
MCV RBC AUTO: 90 FL (ref 81.4–97.8)
PHOSPHATE SERPL-MCNC: 3.4 MG/DL (ref 2.5–4.5)
PLATELET # BLD AUTO: 595 K/UL (ref 164–446)
PMV BLD AUTO: 9.5 FL (ref 9–12.9)
POTASSIUM SERPL-SCNC: 4 MMOL/L (ref 3.6–5.5)
RBC # BLD AUTO: 4.21 M/UL (ref 4.7–6.1)
SODIUM SERPL-SCNC: 132 MMOL/L (ref 135–145)
VANCOMYCIN TROUGH SERPL-MCNC: 14.9 UG/ML (ref 10–20)
WBC # BLD AUTO: 11.7 K/UL (ref 4.8–10.8)

## 2019-09-19 PROCEDURE — 700111 HCHG RX REV CODE 636 W/ 250 OVERRIDE (IP): Performed by: INTERNAL MEDICINE

## 2019-09-19 PROCEDURE — 700105 HCHG RX REV CODE 258: Performed by: INTERNAL MEDICINE

## 2019-09-19 PROCEDURE — 80202 ASSAY OF VANCOMYCIN: CPT

## 2019-09-19 PROCEDURE — 84100 ASSAY OF PHOSPHORUS: CPT

## 2019-09-19 PROCEDURE — A9270 NON-COVERED ITEM OR SERVICE: HCPCS | Performed by: HOSPITALIST

## 2019-09-19 PROCEDURE — 700102 HCHG RX REV CODE 250 W/ 637 OVERRIDE(OP): Performed by: INTERNAL MEDICINE

## 2019-09-19 PROCEDURE — 93971 EXTREMITY STUDY: CPT | Mod: 26 | Performed by: INTERNAL MEDICINE

## 2019-09-19 PROCEDURE — 85027 COMPLETE CBC AUTOMATED: CPT

## 2019-09-19 PROCEDURE — A9270 NON-COVERED ITEM OR SERVICE: HCPCS | Performed by: INTERNAL MEDICINE

## 2019-09-19 PROCEDURE — 73723 MRI JOINT LWR EXTR W/O&W/DYE: CPT | Mod: LT

## 2019-09-19 PROCEDURE — 99232 SBSQ HOSP IP/OBS MODERATE 35: CPT | Performed by: INTERNAL MEDICINE

## 2019-09-19 PROCEDURE — 700102 HCHG RX REV CODE 250 W/ 637 OVERRIDE(OP): Performed by: HOSPITALIST

## 2019-09-19 PROCEDURE — 99232 SBSQ HOSP IP/OBS MODERATE 35: CPT | Performed by: HOSPITALIST

## 2019-09-19 PROCEDURE — A9576 INJ PROHANCE MULTIPACK: HCPCS | Performed by: NURSE PRACTITIONER

## 2019-09-19 PROCEDURE — 700117 HCHG RX CONTRAST REV CODE 255: Performed by: NURSE PRACTITIONER

## 2019-09-19 PROCEDURE — 80048 BASIC METABOLIC PNL TOTAL CA: CPT

## 2019-09-19 PROCEDURE — 36415 COLL VENOUS BLD VENIPUNCTURE: CPT

## 2019-09-19 PROCEDURE — 83735 ASSAY OF MAGNESIUM: CPT

## 2019-09-19 PROCEDURE — 93971 EXTREMITY STUDY: CPT | Mod: LT

## 2019-09-19 PROCEDURE — 770001 HCHG ROOM/CARE - MED/SURG/GYN PRIV*

## 2019-09-19 RX ADMIN — OXYCODONE HYDROCHLORIDE 10 MG: 10 TABLET ORAL at 17:24

## 2019-09-19 RX ADMIN — OXYCODONE HYDROCHLORIDE 10 MG: 10 TABLET ORAL at 05:50

## 2019-09-19 RX ADMIN — ENOXAPARIN SODIUM 40 MG: 100 INJECTION SUBCUTANEOUS at 05:51

## 2019-09-19 RX ADMIN — SENNOSIDES, DOCUSATE SODIUM 2 TABLET: 50; 8.6 TABLET, FILM COATED ORAL at 17:24

## 2019-09-19 RX ADMIN — VANCOMYCIN HYDROCHLORIDE 1500 MG: 500 INJECTION, POWDER, LYOPHILIZED, FOR SOLUTION INTRAVENOUS at 21:03

## 2019-09-19 RX ADMIN — HYDROMORPHONE HYDROCHLORIDE 2 MG: 2 TABLET ORAL at 21:13

## 2019-09-19 RX ADMIN — OXYCODONE HYDROCHLORIDE 10 MG: 10 TABLET ORAL at 11:09

## 2019-09-19 RX ADMIN — VANCOMYCIN HYDROCHLORIDE 1500 MG: 500 INJECTION, POWDER, LYOPHILIZED, FOR SOLUTION INTRAVENOUS at 09:04

## 2019-09-19 RX ADMIN — HYDROMORPHONE HYDROCHLORIDE 2 MG: 2 TABLET ORAL at 14:44

## 2019-09-19 RX ADMIN — CEFTRIAXONE SODIUM 2 G: 2 INJECTION, POWDER, FOR SOLUTION INTRAMUSCULAR; INTRAVENOUS at 05:50

## 2019-09-19 RX ADMIN — HYDROMORPHONE HYDROCHLORIDE 2 MG: 2 TABLET ORAL at 09:02

## 2019-09-19 RX ADMIN — HYDROMORPHONE HYDROCHLORIDE 2 MG: 2 TABLET ORAL at 00:40

## 2019-09-19 RX ADMIN — GADOTERIDOL 20 ML: 279.3 INJECTION, SOLUTION INTRAVENOUS at 18:17

## 2019-09-19 RX ADMIN — ATORVASTATIN CALCIUM 20 MG: 20 TABLET, FILM COATED ORAL at 17:24

## 2019-09-19 RX ADMIN — ACETAMINOPHEN 650 MG: 325 TABLET, FILM COATED ORAL at 14:47

## 2019-09-19 ASSESSMENT — ENCOUNTER SYMPTOMS
CONSTIPATION: 0
NAUSEA: 0
ORTHOPNEA: 0
COUGH: 0
ABDOMINAL PAIN: 0
CLAUDICATION: 0
BACK PAIN: 0
NECK PAIN: 1
DIARRHEA: 0
FEVER: 0
SHORTNESS OF BREATH: 0
HEARTBURN: 0
VOMITING: 0
MYALGIAS: 1
PALPITATIONS: 0
DIAPHORESIS: 0
CHILLS: 0
WEIGHT LOSS: 0
DIZZINESS: 0
HEADACHES: 0

## 2019-09-19 NOTE — CARE PLAN
Problem: Venous Thromboembolism (VTW)/Deep Vein Thrombosis (DVT) Prevention:  Goal: Patient will participate in Venous Thrombosis (VTE)/Deep Vein Thrombosis (DVT)Prevention Measures  Outcome: PROGRESSING AS EXPECTED     Problem: Knowledge Deficit  Goal: Knowledge of the prescribed therapeutic regimen will improve  Outcome: PROGRESSING AS EXPECTED

## 2019-09-19 NOTE — PROGRESS NOTES
Hospital Medicine Daily Progress Note    Date of Service  9/19/2019    Chief Complaint  53 y.o. male admitted 9/10/2019 with lactic acidosis, sepsis septic left knee    Hospital Course    52 yo man w left septic knee.  This was I&D on 9/10 by Dr. Cono.  Drain was removed 9/11.  Culture of the knee fluid finalized on September 14, 2018, negative for any organism.  Report from outside hospital of the fluid cultures also negative, Gram stain show gram-positive cocci.  Despite negative culture and wash out, clinically not improved, spiked fever, so ID consulted and added CFTX to vancomycin. Patient again with leukocytosis, worrisome for OM.      Interval Problem Update  Patient without complaints  No events overnight  Leukocytosis has jumped from 9-11  Venous doppler again negative  Left knee , edematous but without obvious weeping or purulence  Plan was for 4 weeks of IV antibiotics with a stop date of 10/8  Currently receiving vancomycin and ceftriaxone       Consultants/Specialty  Infectious disease  Orthopedics    Code Status  Full    Disposition  To be determined    Review of Systems  Review of Systems   Constitutional: Negative for chills, diaphoresis, fever, malaise/fatigue and weight loss.   Cardiovascular: Negative for chest pain, palpitations, orthopnea and claudication.   Gastrointestinal: Negative for abdominal pain, constipation, diarrhea, heartburn, nausea and vomiting.   Genitourinary: Negative for dysuria and urgency.   Musculoskeletal: Positive for joint pain, myalgias and neck pain. Negative for back pain.   Skin: Negative for rash.   All other systems reviewed and are negative.       Physical Exam  Temp:  [36.6 °C (97.8 °F)-37.7 °C (99.9 °F)] 37 °C (98.6 °F)  Pulse:  [] 105  Resp:  [17] 17  BP: (127-133)/(60-79) 133/79  SpO2:  [92 %-97 %] 97 %    Physical Exam   Constitutional: He is oriented to person, place, and time. He appears well-developed and well-nourished. No distress.    HENT:   Head: Normocephalic and atraumatic.   Eyes: Pupils are equal, round, and reactive to light. EOM are normal.   Neck: Neck supple.   Cardiovascular: Normal rate, regular rhythm and normal heart sounds.   Pulmonary/Chest: Effort normal and breath sounds normal. No respiratory distress. He has no wheezes. He has no rales.   Abdominal: Soft. He exhibits no distension. There is no tenderness.   Musculoskeletal:   Left knee CDI   Neurological: He is alert and oriented to person, place, and time. No cranial nerve deficit.   Skin: Skin is warm and dry.       Fluids    Intake/Output Summary (Last 24 hours) at 9/19/2019 1413  Last data filed at 9/19/2019 1300  Gross per 24 hour   Intake 1530 ml   Output 2975 ml   Net -1445 ml       Laboratory  Recent Labs     09/17/19  0435 09/18/19  0929 09/19/19  0115   WBC 8.4 8.3 11.7*   RBC 4.55* 4.52* 4.21*   HEMOGLOBIN 13.1* 13.6* 12.7*   HEMATOCRIT 40.9* 41.1* 37.9*   MCV 89.9 90.9 90.0   MCH 28.8 30.1 30.2   MCHC 32.0* 33.1* 33.5*   RDW 42.5 43.4 42.8   PLATELETCT 440 418 595*   MPV 9.7 10.0 9.5     Recent Labs     09/17/19  0435 09/18/19  0929 09/19/19  0115   SODIUM 132* 132* 132*   POTASSIUM 3.9 4.1 4.0   CHLORIDE 96 99 97   CO2 26 24 25   GLUCOSE 150* 159* 125*   BUN 17 13 13   CREATININE 1.02 0.85 0.96   CALCIUM 8.9 8.9 8.8     Recent Labs     09/17/19  0435   INR 1.14*               Imaging  US-EXTREMITY VENOUS LOWER UNILAT LEFT         IR-MIDLINE CATHETER INSERTION WO GUIDANCE > AGE 3   Final Result                  Ultrasound-guided midline placement performed by qualified nursing staff    as above.          IR-US GUIDED PIV   Final Result    Ultrasound-guided PERIPHERAL IV INSERTION performed by    qualified nursing staff as above.            US-EXTREMITY VENOUS LOWER UNILAT LEFT   Final Result      US-FOREIGN FILM ULTRASOUND   Final Result      MR-KNEE-WITH & W/O LEFT    (Results Pending)        Assessment/Plan  * Septic arthritis of knee, left (HCC)- (present on  admission)  Assessment & Plan  Left knee septic arthritis with greater than 100,000 WBC on arthrocentesis   Status post I&D 9/10 by Dr. Coon  IV abx per infectious disease--ceftriaxone added to vancomycin after patient respect fevers  Stop date 10/8  Pt/ot pain control --added baclofen 9/18  Need to follow arthrocentesis cultures at OSH  Given recurrent leukocytosis repeat imaging of the need to rule out osteomyelitis  Follow CBC daily  Repeat venous Doppler given pain and edema    HLD (hyperlipidemia)- (present on admission)  Assessment & Plan  Resume home medications when appropriate    Obesity- (present on admission)  Assessment & Plan  Encouraged weight loss       VTE prophylaxis: Enoxaparin

## 2019-09-19 NOTE — PROGRESS NOTES
Pharmacy Kinetics 53 y.o. male on vancomycin day # 9 2019    Currently on Vancomycin 1500 mg iv q12hr  Provider specified end date: TBD  Plan 4 weeks IV ABX (10/08/2019) per ID    Indication for Treatment: Septic arthritis of left knee, SSTI    Pertinent history per medical record: Admitted on 9/10/2019 for septic joint. I&D performed on 9/10 by Dr. Coon. Drain was removed on . Pt has ongoing low grade temperatures and knee pain. Repeat imaging ordered to r/o abscess or osteomyelitis.    Other antibiotics: ceftriaxone 2 g IV Q24H    Allergies: Patient has no known allergies.     List concerns for renal function: obesity (BMI 33.97 kg/m2)    Pertinent cultures to date:   9/10 - Blood cultures x2 (peripheral): No growth after 5 days  9/10 - Wound culture w/ gram stain: no growth; gram stain showed many WBCs and rare GPC  9/10 - Anaerobic culture from wound: pending   - Blood cultures x2 (peripheral): NGTD    MRSA nares swab if pneumonia is a concern (ordered/positive/negative/n-a): N/A    Recent Labs     19  0435 19  0929 19  0115   WBC 8.4 8.3 11.7*   NEUTSPOLYS 64.60  --   --      Recent Labs     19  0435 19  0929 19  0115   BUN 17 13 13   CREATININE 1.02 0.85 0.96     Recent Labs     19  0830   VANCOTROUGH 14.9       Intake/Output Summary (Last 24 hours) at 2019 1524  Last data filed at 2019 1300  Gross per 24 hour   Intake 1530 ml   Output 2675 ml   Net -1145 ml      /79   Pulse (!) 105   Temp 37 °C (98.6 °F) (Temporal)   Resp 17   Ht 1.829 m (6')   Wt 113.6 kg (250 lb 7.1 oz)   SpO2 97%  Temp (24hrs), Av.1 °C (98.7 °F), Min:36.6 °C (97.8 °F), Max:37.7 °C (99.9 °F)      A/P   1. Vancomycin dose change: None. Continue Vancomycin 1,500 mg Q12H (~13 mg/kg; due at 0900 and 2100)  2. Next vancomycin level:  @ 0830 (not ordered)  3. Goal trough: 12-16 mcg/mL  4. Comments: Max temp of 99.9, episodes of tachycardia (max 106), and  increase in WBCs to 11.7 today. SCr with a mild upward trend, but patient has been fluctuating throughout admission. Will continue to monitor renal function while the patient is on vancomycin. Patient is at risk for drug accumulation due to increased body habitus. Trough was drawn appropriately and resulted as 14.9 this AM. Continue current vanco regimen. Repeat level in two days. Pharmacy will continue to follow and adjust as needed.I     Juliane Miller, VeraD

## 2019-09-19 NOTE — PROGRESS NOTES
Pt exhibits no significant change today, and remains focused on pain medication schedule. Pt educated that he should reserve medications for when pain is peaking, after he requests them every three hours, and frequently questions when they are due.  Swelling is significant in knee. Knee elevated on pillows, pt is eating and resting.   1724) Pt down to MRI with night shift protective staff and transport aid.

## 2019-09-19 NOTE — CARE PLAN
Problem: Venous Thromboembolism (VTW)/Deep Vein Thrombosis (DVT) Prevention:  Goal: Patient will participate in Venous Thrombosis (VTE)/Deep Vein Thrombosis (DVT)Prevention Measures  Outcome: PROGRESSING AS EXPECTED  Lovenox given per MAR     Problem: Knowledge Deficit  Goal: Knowledge of the prescribed therapeutic regimen will improve  Outcome: PROGRESSING AS EXPECTED   Knowledge of medications assessed. Questions answered about medication and educated on side effects

## 2019-09-19 NOTE — PROGRESS NOTES
Patient A&O X4. One person assist with front wheel walker. Right sinle lumen midline with good blood return. Left leg swollen, Left knee incision with staples, clean, dry and intact. Weight as tolerated on that left leg. Complained of left knee pain, Oxycodone and Dilaudid given per MAR. Bed alarm on, bed in lowest position. Needs met at this time. WCTM.

## 2019-09-19 NOTE — CARE PLAN
Problem: Safety  Goal: Will remain free from injury  Outcome: PROGRESSING AS EXPECTED  Goal: Will remain free from falls  Outcome: PROGRESSING AS EXPECTED     Problem: Infection  Goal: Will remain free from infection  Outcome: PROGRESSING AS EXPECTED     Problem: Knowledge Deficit  Goal: Knowledge of disease process/condition, treatment plan, diagnostic tests, and medications will improve  Outcome: PROGRESSING AS EXPECTED  Goal: Knowledge of the prescribed therapeutic regimen will improve  Outcome: PROGRESSING AS EXPECTED

## 2019-09-19 NOTE — PROGRESS NOTES
Infectious Disease Progress Note    Author: Yumiko Da Silva M.D. Date & Time of service: 2019  12:44 PM    Chief Complaint:  FU Left septic knee     Interval History:   Tmax 100.8 WBC 8.9 Ongoing left knee pain. Unable to stand or ambulate. Tolerating IV abx without issues.   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 Ongoing left knee pain 8/10 in severity with pain meds.    T-max 99.8 WBC 8.3 patient is doing well without any new issues to report.  PICC line placed today   T-max 99.9 WBC 11.7 ongoing left knee pain associated with swelling.  Denies any cough, sore throat, dysuria or diarrhea    Review of Systems:  Review of Systems   Constitutional: Negative for chills, fever and malaise/fatigue.   Respiratory: Negative for cough and shortness of breath.    Gastrointestinal: Negative for abdominal pain, constipation, diarrhea, nausea and vomiting.   Musculoskeletal: Positive for joint pain.        Left knee   Neurological: Negative for dizziness and headaches.   All other systems reviewed and are negative.      Hemodynamics:  Temp (24hrs), Av.1 °C (98.7 °F), Min:36.6 °C (97.8 °F), Max:37.7 °C (99.9 °F)  Temperature: (Q8 per RN)  Pulse  Av.8  Min: 63  Max: 106   Blood Pressure: 133/79       Physical Exam:  Physical Exam   Constitutional: He is oriented to person, place, and time. He appears well-developed and well-nourished.   HENT:   Head: Normocephalic and atraumatic.   Fair dentition   Eyes: Pupils are equal, round, and reactive to light. Conjunctivae and EOM are normal.   Cardiovascular: Normal rate, regular rhythm and normal heart sounds.   Pulmonary/Chest: Effort normal and breath sounds normal.   Abdominal: Soft. Bowel sounds are normal. He exhibits no distension. There is tenderness. There is no rebound and no guarding.   Musculoskeletal: He exhibits edema and tenderness.   Left knee surgical site with staples in place, well  approximated. +Edema extending down to the ankle peer no erythema. No draingage    Right upper extremity midline-nontender, no surrounding erythema   Neurological: He is alert and oriented to person, place, and time.   Skin: Skin is warm and dry.   Multiple tattoos   Psychiatric: He has a normal mood and affect. His behavior is normal.       Meds:    Current Facility-Administered Medications:   •  [START ON 9/20/2019] cefTRIAXone (ROCEPHIN) IV  •  MD Alert...Vancomycin per Pharmacy  •  baclofen  •  enoxaparin  •  acetaminophen  •  vancomycin  •  HYDROmorphone  •  atorvastatin  •  senna-docusate **AND** polyethylene glycol/lytes **AND** magnesium hydroxide **AND** bisacodyl  •  Notify provider if pain remains uncontrolled **AND** Use the numeric rating scale (NRS-11) on regular floors and Critical-Care Pain Observation Tool (CPOT) on ICUs/Trauma to assess pain **AND** Pulse Ox (Oximetry) **AND** Pharmacy Consult Request **AND** If patient difficult to arouse and/or has respiratory depression, stop any opiates that are currently infusing and call a Rapid Response. **AND** oxyCODONE immediate-release **AND** oxyCODONE immediate-release **AND** morphine injection  •  ondansetron  •  ondansetron  •  promethazine  •  promethazine  •  prochlorperazine    Labs:  Recent Labs     09/17/19 0435 09/18/19 0929 09/19/19  0115   WBC 8.4 8.3 11.7*   RBC 4.55* 4.52* 4.21*   HEMOGLOBIN 13.1* 13.6* 12.7*   HEMATOCRIT 40.9* 41.1* 37.9*   MCV 89.9 90.9 90.0   MCH 28.8 30.1 30.2   RDW 42.5 43.4 42.8   PLATELETCT 440 418 595*   MPV 9.7 10.0 9.5   NEUTSPOLYS 64.60  --   --    LYMPHOCYTES 19.70*  --   --    MONOCYTES 10.30  --   --    EOSINOPHILS 3.20  --   --    BASOPHILS 0.50  --   --      Recent Labs     09/17/19 0435 09/18/19 0929 09/19/19  0115   SODIUM 132* 132* 132*   POTASSIUM 3.9 4.1 4.0   CHLORIDE 96 99 97   CO2 26 24 25   GLUCOSE 150* 159* 125*   BUN 17 13 13     Recent Labs     09/17/19  0435 09/18/19  0929 09/19/19  0115    CREATININE 1.02 0.85 0.96       Imaging:  Ir-us Guided Piv    Result Date: 9/15/2019  EXAMINATION:                                                                    HISTORY/REASON FOR EXAM:  Ultrasound Guided PIV.  TECHNIQUE/EXAM DESCRIPTION AND NUMBER OF VIEWS:  Peripheral IV insertion with ultrasound guidance.  The procedure was prepared using maximal sterile barrier technique including sterile gown, mask, cap, and donning of sterile gloves following appropriate hand hygiene and/or sterile scrub. Patient skin site was prepped with 2% Chlorhexidine solution.   FINDINGS: Peripheral IV insertion with Ultrasound Guidance was performed by qualified imaging nursing staff without the assistance of a Radiologist.      Ultrasound-guided PERIPHERAL IV INSERTION performed by qualified nursing staff as above.     Us-extremity Venous Lower Unilat Left    Result Date: 9/15/2019   Vascular Laboratory  CONCLUSIONS  No prior study is available for comparison.  Normal left lower extremity superficial and deep venous examination.  COLEMAN[place]URBAN  Exam Date:     09/15/2019 08:40  Room #:     Inpatient  Priority:     Routine  Ht (in):             Wt (lb):  Ordering Physician:        MARIELOS OLIVARES  Referring Physician:       [id number] Duran  Sonographer:               Lucila Wilson RVT, RDCS  Study Type:                Complete Unilateral  Technical Quality:         Good  Age:    53    Gender:     M  MRN:    [id number]  :    1965      BSA:  Indications:     Pain in Limb, Edema  CPT Codes:       79918  ICD Codes:       729.5  782.3  History:         Septic left knee with pain and swelling  Limitations:  PROCEDURES:  Left lower extremity venous duplex imaging.  The following venous structures were evaluated: common femoral, profunda  femoral, greater saphenous, femoral, popliteal , peroneal and posterior  tibial veins.  Serial compression, augmentation maneuvers,  color and spectral Doppler  flow  "evaluations were performed.  FINDINGS:  Left lower extremity -  Complete color filling and compressibility with normal venous flow dynamics  including spontaneous flow, response to augmentation maneuvers, and  respiratory phasicity.  No superficial or deep venous thrombosis.  Flow was evaluated in the contralateral common femoral vein and normal  venous flow dynamics including spontaneous flow, respiratory phasic  variation and augmentation were demonstrated.  Kwan Atkins MD  (Electronically Signed)  Final Date:      15 September 2019                   09:53      Micro:  Results     Procedure Component Value Units Date/Time    CULTURE WOUND W/ GRAM STAIN [[id number]] Collected:  09/10/19 2309    Order Status:  Completed Specimen:  Wound Updated:  09/19/19 0642     Significant Indicator NEG     Source WND     Site Left Knee     Culture Result No growth at 8 days.     Gram Stain Result Many WBCs.  Rare Gram positive cocci.      Narrative:       Surgery - swabs received    Anaerobic Culture [[id number]] Collected:  09/10/19 2309    Order Status:  Completed Specimen:  Wound Updated:  09/19/19 0642     Significant Indicator NEG     Source WND     Site Left Knee     Culture Result Culture in progress.    Narrative:       Surgery - swabs received    BLOOD CULTURE [[id number]] Collected:  09/16/19 1006    Order Status:  Completed Specimen:  Blood from Peripheral Updated:  09/17/19 0829     Significant Indicator NEG     Source BLD     Site PERIPHERAL     Culture Result No Growth  Note: Blood cultures are incubated for 5 days and  are monitored continuously.Positive blood cultures  are called to the RN and reported as soon as  they are identified.      Narrative:       Per Hospital Policy: Only change Specimen Src: to \"Line\" if  specified by physician order.  Right Hand    BLOOD CULTURE [[id number]] Collected:  09/16/19 1006    Order Status:  Completed Specimen:  Blood from Peripheral Updated:  09/17/19 0829     " "Significant Indicator NEG     Source BLD     Site PERIPHERAL     Culture Result No Growth  Note: Blood cultures are incubated for 5 days and  are monitored continuously.Positive blood cultures  are called to the RN and reported as soon as  they are identified.      Narrative:       Per Hospital Policy: Only change Specimen Src: to \"Line\" if  specified by physician order.  Left Hand    Blood Culture [[id number]]     Order Status:  Canceled Specimen:  Blood from Peripheral     Blood Culture [[id number]] Collected:  09/10/19 2225    Order Status:  Completed Specimen:  Blood from Peripheral Updated:  09/16/19 0100     Significant Indicator NEG     Source BLD     Site PERIPHERAL     Culture Result No growth after 5 days of incubation.    Narrative:       From different peripheral sites, if not done within the last  24 hours (Per Hospital Policy: Only change specimen source to  \"Line\" if specified by physician order)  Left AC    Blood Culture [[id number]] Collected:  09/10/19 2225    Order Status:  Completed Specimen:  Blood from Peripheral Updated:  09/16/19 0100     Significant Indicator NEG     Source BLD     Site PERIPHERAL     Culture Result No growth after 5 days of incubation.    Narrative:       From different peripheral sites, if not done within the last  24 hours (Per Hospital Policy: Only change specimen source to  \"Line\" if specified by physician order)  Left Hand          Assessment:  Active Hospital Problems    Diagnosis   • *Septic arthritis of knee, left (HCC) [M00.9]       ASSESSMENT/PLAN:      Deanna Moe is a 53 y.o.  admitted 9/10/2019. Pt has no significant past medical history.  He is in the [facility] system and states that approximately 1 month ago he \"bumped\" his knee.  Since that time he had slowly progressive edema, redness and pain.  Symptoms progressed until just prior to admit when he had severe pain edema and was unable to ambulate.  He was initially brought to an outside facility ([facility] in " [place]) and per notes arthrocentesis revealed markedly elevated WBC count and initial Gram stain with GPC's.  Per notes the outside facility arthrocentesis with WBC neutral count of 112,000.  He went to the OR on 9/10 for washout of the knee.     Left knee septic arthritis-not improving  Fever curve improving  No leukocytosis  Injury some weeks ago and progressed, no prior his of knee injury and native joint   Arthrocentesis at [facility] with WBC of 112,000 confirmed verbally and gram stain with rare GPCs, no growth on culture and final at 72 hours.  They have no remaining specimen.  S/p I&D with synovectomy on 9/10. A significant amount of purulent fluid seen in the joint and extensive  synovitis per OP note  OR cultures with GPCs and no growth (on abx prior)  Doppler US - neg for DVT  Microbiology holding cultures for 14 days  Continue IV vancomycin and ceftriaxone 2 g daily  Monitor renal function and vanco trough  Vanco trough 14.9 today  Plan for 4 weeks of IV abx from 9/10  Stop date 10/08/19  Given continue low-grade temperatures and minimal improvement in regards to swelling, and pain, agree with repeat imaging to rule out abscess or osteomyelitis    Ongoing fevers  T-max 99.9  Blood cultures 9/16 - NGTD  On abx above  If persistent, needs ortho re-evaluation for possible surgical intervention    I have performed a physical exam and reviewed and updated ROS and plan today 9/19/2019.  In review of yesterday's note 9/18/2019, there are no changes except as documented above.    Plan of care discussed with Reinaldo DELEON

## 2019-09-20 ENCOUNTER — ANESTHESIA EVENT (OUTPATIENT)
Dept: SURGERY | Facility: MEDICAL CENTER | Age: 54
DRG: 485 | End: 2019-09-20
Payer: COMMERCIAL

## 2019-09-20 ENCOUNTER — ANESTHESIA (OUTPATIENT)
Dept: SURGERY | Facility: MEDICAL CENTER | Age: 54
DRG: 485 | End: 2019-09-20
Payer: COMMERCIAL

## 2019-09-20 ENCOUNTER — APPOINTMENT (OUTPATIENT)
Dept: RADIOLOGY | Facility: MEDICAL CENTER | Age: 54
DRG: 485 | End: 2019-09-20
Attending: HOSPITALIST
Payer: COMMERCIAL

## 2019-09-20 LAB
ERYTHROCYTE [DISTWIDTH] IN BLOOD BY AUTOMATED COUNT: 43.5 FL (ref 35.9–50)
HCT VFR BLD AUTO: 36.4 % (ref 42–52)
HGB BLD-MCNC: 11.9 G/DL (ref 14–18)
MCH RBC QN AUTO: 29.5 PG (ref 27–33)
MCHC RBC AUTO-ENTMCNC: 32.7 G/DL (ref 33.7–35.3)
MCV RBC AUTO: 90.3 FL (ref 81.4–97.8)
PLATELET # BLD AUTO: 550 K/UL (ref 164–446)
PMV BLD AUTO: 9.2 FL (ref 9–12.9)
RBC # BLD AUTO: 4.03 M/UL (ref 4.7–6.1)
WBC # BLD AUTO: 8.6 K/UL (ref 4.8–10.8)

## 2019-09-20 PROCEDURE — 700105 HCHG RX REV CODE 258: Performed by: ANESTHESIOLOGY

## 2019-09-20 PROCEDURE — 700102 HCHG RX REV CODE 250 W/ 637 OVERRIDE(OP)

## 2019-09-20 PROCEDURE — 36415 COLL VENOUS BLD VENIPUNCTURE: CPT

## 2019-09-20 PROCEDURE — A9270 NON-COVERED ITEM OR SERVICE: HCPCS

## 2019-09-20 PROCEDURE — 99233 SBSQ HOSP IP/OBS HIGH 50: CPT | Performed by: INTERNAL MEDICINE

## 2019-09-20 PROCEDURE — 160038 HCHG SURGERY MINUTES - EA ADDL 1 MIN LEVEL 2: Performed by: ORTHOPAEDIC SURGERY

## 2019-09-20 PROCEDURE — 05HY33Z INSERTION OF INFUSION DEVICE INTO UPPER VEIN, PERCUTANEOUS APPROACH: ICD-10-PCS | Performed by: HOSPITALIST

## 2019-09-20 PROCEDURE — 87070 CULTURE OTHR SPECIMN AEROBIC: CPT

## 2019-09-20 PROCEDURE — 700111 HCHG RX REV CODE 636 W/ 250 OVERRIDE (IP): Performed by: ANESTHESIOLOGY

## 2019-09-20 PROCEDURE — 500891 HCHG PACK, ORTHO MAJOR: Performed by: ORTHOPAEDIC SURGERY

## 2019-09-20 PROCEDURE — 87015 SPECIMEN INFECT AGNT CONCNTJ: CPT | Mod: 91

## 2019-09-20 PROCEDURE — 700111 HCHG RX REV CODE 636 W/ 250 OVERRIDE (IP)

## 2019-09-20 PROCEDURE — A9270 NON-COVERED ITEM OR SERVICE: HCPCS | Performed by: HOSPITALIST

## 2019-09-20 PROCEDURE — A9270 NON-COVERED ITEM OR SERVICE: HCPCS | Performed by: INTERNAL MEDICINE

## 2019-09-20 PROCEDURE — 501838 HCHG SUTURE GENERAL: Performed by: ORTHOPAEDIC SURGERY

## 2019-09-20 PROCEDURE — 700102 HCHG RX REV CODE 250 W/ 637 OVERRIDE(OP): Performed by: HOSPITALIST

## 2019-09-20 PROCEDURE — 700111 HCHG RX REV CODE 636 W/ 250 OVERRIDE (IP): Performed by: INTERNAL MEDICINE

## 2019-09-20 PROCEDURE — 85027 COMPLETE CBC AUTOMATED: CPT

## 2019-09-20 PROCEDURE — 0SBD0ZZ EXCISION OF LEFT KNEE JOINT, OPEN APPROACH: ICD-10-PCS | Performed by: ORTHOPAEDIC SURGERY

## 2019-09-20 PROCEDURE — 87116 MYCOBACTERIA CULTURE: CPT

## 2019-09-20 PROCEDURE — 700111 HCHG RX REV CODE 636 W/ 250 OVERRIDE (IP): Performed by: HOSPITALIST

## 2019-09-20 PROCEDURE — 99232 SBSQ HOSP IP/OBS MODERATE 35: CPT | Performed by: HOSPITALIST

## 2019-09-20 PROCEDURE — 700105 HCHG RX REV CODE 258: Performed by: INTERNAL MEDICINE

## 2019-09-20 PROCEDURE — 160027 HCHG SURGERY MINUTES - 1ST 30 MINS LEVEL 2: Performed by: ORTHOPAEDIC SURGERY

## 2019-09-20 PROCEDURE — 87205 SMEAR GRAM STAIN: CPT

## 2019-09-20 PROCEDURE — 700102 HCHG RX REV CODE 250 W/ 637 OVERRIDE(OP): Performed by: INTERNAL MEDICINE

## 2019-09-20 PROCEDURE — 700105 HCHG RX REV CODE 258: Performed by: HOSPITALIST

## 2019-09-20 PROCEDURE — C1751 CATH, INF, PER/CENT/MIDLINE: HCPCS

## 2019-09-20 PROCEDURE — 0SCD0ZZ EXTIRPATION OF MATTER FROM LEFT KNEE JOINT, OPEN APPROACH: ICD-10-PCS | Performed by: ORTHOPAEDIC SURGERY

## 2019-09-20 PROCEDURE — 160002 HCHG RECOVERY MINUTES (STAT): Performed by: ORTHOPAEDIC SURGERY

## 2019-09-20 PROCEDURE — 160009 HCHG ANES TIME/MIN: Performed by: ORTHOPAEDIC SURGERY

## 2019-09-20 PROCEDURE — 87206 SMEAR FLUORESCENT/ACID STAI: CPT

## 2019-09-20 PROCEDURE — 160048 HCHG OR STATISTICAL LEVEL 1-5: Performed by: ORTHOPAEDIC SURGERY

## 2019-09-20 PROCEDURE — 700101 HCHG RX REV CODE 250: Performed by: ANESTHESIOLOGY

## 2019-09-20 PROCEDURE — 87102 FUNGUS ISOLATION CULTURE: CPT

## 2019-09-20 PROCEDURE — 770001 HCHG ROOM/CARE - MED/SURG/GYN PRIV*

## 2019-09-20 PROCEDURE — 87075 CULTR BACTERIA EXCEPT BLOOD: CPT

## 2019-09-20 PROCEDURE — 160035 HCHG PACU - 1ST 60 MINS PHASE I: Performed by: ORTHOPAEDIC SURGERY

## 2019-09-20 RX ORDER — ONDANSETRON 2 MG/ML
INJECTION INTRAMUSCULAR; INTRAVENOUS PRN
Status: DISCONTINUED | OUTPATIENT
Start: 2019-09-20 | End: 2019-09-20 | Stop reason: SURG

## 2019-09-20 RX ORDER — MEPERIDINE HYDROCHLORIDE 25 MG/ML
25 INJECTION INTRAMUSCULAR; INTRAVENOUS; SUBCUTANEOUS
Status: DISCONTINUED | OUTPATIENT
Start: 2019-09-20 | End: 2019-09-20 | Stop reason: HOSPADM

## 2019-09-20 RX ORDER — SODIUM CHLORIDE, SODIUM LACTATE, POTASSIUM CHLORIDE, CALCIUM CHLORIDE 600; 310; 30; 20 MG/100ML; MG/100ML; MG/100ML; MG/100ML
INJECTION, SOLUTION INTRAVENOUS CONTINUOUS
Status: DISCONTINUED | OUTPATIENT
Start: 2019-09-20 | End: 2019-09-20 | Stop reason: HOSPADM

## 2019-09-20 RX ORDER — ONDANSETRON 2 MG/ML
4 INJECTION INTRAMUSCULAR; INTRAVENOUS
Status: DISCONTINUED | OUTPATIENT
Start: 2019-09-20 | End: 2019-09-20 | Stop reason: HOSPADM

## 2019-09-20 RX ORDER — OXYCODONE HYDROCHLORIDE AND ACETAMINOPHEN 5; 325 MG/1; MG/1
1 TABLET ORAL
Status: DISCONTINUED | OUTPATIENT
Start: 2019-09-20 | End: 2019-09-20 | Stop reason: HOSPADM

## 2019-09-20 RX ORDER — DIPHENHYDRAMINE HYDROCHLORIDE 50 MG/ML
12.5 INJECTION INTRAMUSCULAR; INTRAVENOUS
Status: DISCONTINUED | OUTPATIENT
Start: 2019-09-20 | End: 2019-09-20 | Stop reason: HOSPADM

## 2019-09-20 RX ORDER — HALOPERIDOL 5 MG/ML
INJECTION INTRAMUSCULAR
Status: COMPLETED
Start: 2019-09-20 | End: 2019-09-20

## 2019-09-20 RX ORDER — MIDAZOLAM HYDROCHLORIDE 1 MG/ML
2 INJECTION INTRAMUSCULAR; INTRAVENOUS
Status: DISCONTINUED | OUTPATIENT
Start: 2019-09-20 | End: 2019-09-20 | Stop reason: HOSPADM

## 2019-09-20 RX ORDER — SODIUM CHLORIDE, SODIUM LACTATE, POTASSIUM CHLORIDE, CALCIUM CHLORIDE 600; 310; 30; 20 MG/100ML; MG/100ML; MG/100ML; MG/100ML
INJECTION, SOLUTION INTRAVENOUS
Status: DISCONTINUED | OUTPATIENT
Start: 2019-09-20 | End: 2019-09-20 | Stop reason: SURG

## 2019-09-20 RX ORDER — LABETALOL HYDROCHLORIDE 5 MG/ML
5 INJECTION, SOLUTION INTRAVENOUS
Status: DISCONTINUED | OUTPATIENT
Start: 2019-09-20 | End: 2019-09-20 | Stop reason: HOSPADM

## 2019-09-20 RX ORDER — OXYCODONE HYDROCHLORIDE AND ACETAMINOPHEN 5; 325 MG/1; MG/1
2 TABLET ORAL
Status: DISCONTINUED | OUTPATIENT
Start: 2019-09-20 | End: 2019-09-20 | Stop reason: HOSPADM

## 2019-09-20 RX ORDER — OXYCODONE HCL 5 MG/5 ML
10 SOLUTION, ORAL ORAL
Status: COMPLETED | OUTPATIENT
Start: 2019-09-20 | End: 2019-09-20

## 2019-09-20 RX ORDER — OXYCODONE HCL 5 MG/5 ML
5 SOLUTION, ORAL ORAL
Status: COMPLETED | OUTPATIENT
Start: 2019-09-20 | End: 2019-09-20

## 2019-09-20 RX ORDER — OXYCODONE HCL 5 MG/5 ML
SOLUTION, ORAL ORAL
Status: COMPLETED
Start: 2019-09-20 | End: 2019-09-20

## 2019-09-20 RX ORDER — HALOPERIDOL 5 MG/ML
1 INJECTION INTRAMUSCULAR
Status: DISCONTINUED | OUTPATIENT
Start: 2019-09-20 | End: 2019-09-20 | Stop reason: HOSPADM

## 2019-09-20 RX ORDER — MEPERIDINE HYDROCHLORIDE 25 MG/ML
INJECTION INTRAMUSCULAR; INTRAVENOUS; SUBCUTANEOUS PRN
Status: DISCONTINUED | OUTPATIENT
Start: 2019-09-20 | End: 2019-09-20 | Stop reason: SURG

## 2019-09-20 RX ADMIN — OXYCODONE HYDROCHLORIDE 10 MG: 5 SOLUTION ORAL at 18:26

## 2019-09-20 RX ADMIN — ONDANSETRON 4 MG: 2 INJECTION INTRAMUSCULAR; INTRAVENOUS at 18:08

## 2019-09-20 RX ADMIN — ATORVASTATIN CALCIUM 20 MG: 20 TABLET, FILM COATED ORAL at 20:31

## 2019-09-20 RX ADMIN — ROCURONIUM BROMIDE 5 MG: 10 INJECTION, SOLUTION INTRAVENOUS at 17:42

## 2019-09-20 RX ADMIN — SUCCINYLCHOLINE CHLORIDE 60 MG: 20 INJECTION, SOLUTION INTRAMUSCULAR; INTRAVENOUS at 17:44

## 2019-09-20 RX ADMIN — HYDROMORPHONE HYDROCHLORIDE 2 MG: 2 TABLET ORAL at 02:51

## 2019-09-20 RX ADMIN — HALOPERIDOL LACTATE 1 MG: 5 INJECTION, SOLUTION INTRAMUSCULAR at 18:27

## 2019-09-20 RX ADMIN — OXYCODONE HYDROCHLORIDE 10 MG: 10 TABLET ORAL at 06:26

## 2019-09-20 RX ADMIN — PROPOFOL 100 MG: 10 INJECTION, EMULSION INTRAVENOUS at 17:44

## 2019-09-20 RX ADMIN — PROPOFOL 30 MG: 10 INJECTION, EMULSION INTRAVENOUS at 17:42

## 2019-09-20 RX ADMIN — ALFENTANIL HYDROCHLORIDE 1000 MCG: 500 INJECTION, SOLUTION INTRAVENOUS at 17:42

## 2019-09-20 RX ADMIN — SODIUM CHLORIDE, POTASSIUM CHLORIDE, SODIUM LACTATE AND CALCIUM CHLORIDE: 600; 310; 30; 20 INJECTION, SOLUTION INTRAVENOUS at 17:41

## 2019-09-20 RX ADMIN — HALOPERIDOL 1 MG: 5 INJECTION INTRAMUSCULAR at 18:27

## 2019-09-20 RX ADMIN — VANCOMYCIN HYDROCHLORIDE 1500 MG: 500 INJECTION, POWDER, LYOPHILIZED, FOR SOLUTION INTRAVENOUS at 20:29

## 2019-09-20 RX ADMIN — CEFTRIAXONE SODIUM 2 G: 2 INJECTION, POWDER, FOR SOLUTION INTRAMUSCULAR; INTRAVENOUS at 10:29

## 2019-09-20 RX ADMIN — VANCOMYCIN HYDROCHLORIDE 1500 MG: 500 INJECTION, POWDER, LYOPHILIZED, FOR SOLUTION INTRAVENOUS at 11:33

## 2019-09-20 RX ADMIN — Medication 10 MG: at 18:26

## 2019-09-20 RX ADMIN — OXYCODONE HYDROCHLORIDE 10 MG: 10 TABLET ORAL at 11:32

## 2019-09-20 RX ADMIN — ENOXAPARIN SODIUM 40 MG: 100 INJECTION SUBCUTANEOUS at 06:11

## 2019-09-20 RX ADMIN — OXYCODONE HYDROCHLORIDE 10 MG: 10 TABLET ORAL at 23:02

## 2019-09-20 RX ADMIN — SENNOSIDES, DOCUSATE SODIUM 2 TABLET: 50; 8.6 TABLET, FILM COATED ORAL at 06:12

## 2019-09-20 RX ADMIN — MEPERIDINE HYDROCHLORIDE 25 MG: 25 INJECTION INTRAMUSCULAR; INTRAVENOUS; SUBCUTANEOUS at 17:51

## 2019-09-20 RX ADMIN — MEPERIDINE HYDROCHLORIDE 25 MG: 25 INJECTION INTRAMUSCULAR; INTRAVENOUS; SUBCUTANEOUS at 18:05

## 2019-09-20 ASSESSMENT — ENCOUNTER SYMPTOMS
CLAUDICATION: 0
ORTHOPNEA: 0
CONSTIPATION: 0
COUGH: 0
FEVER: 0
DIAPHORESIS: 0
DIZZINESS: 0
NAUSEA: 0
HEADACHES: 0
ABDOMINAL PAIN: 0
HEARTBURN: 0
NECK PAIN: 1
MYALGIAS: 1
DIARRHEA: 0
CHILLS: 0
BACK PAIN: 0
WEIGHT LOSS: 0
SHORTNESS OF BREATH: 0
VOMITING: 0
PALPITATIONS: 0

## 2019-09-20 ASSESSMENT — PAIN SCALES - GENERAL: PAIN_LEVEL: 1

## 2019-09-20 NOTE — PROGRESS NOTES
Hospital Medicine Daily Progress Note    Date of Service  9/20/2019    Chief Complaint  53 y.o. male admitted 9/10/2019 with lactic acidosis, sepsis septic left knee    Hospital Course    54 yo man w left septic knee.  This was I&D on 9/10 by Dr. Coon.  Drain was removed 9/11.  Culture of the knee fluid finalized on September 14, 2018, negative for any organism.  Report from outside hospital of the fluid cultures also negative, Gram stain show gram-positive cocci.  Despite negative culture and wash out, clinically not improved, spiked fever, so ID consulted and added CFTX to vancomycin. Patient again with leukocytosis, worrisome for OM.      Interval Problem Update  No event overnights  WBC back to normal today  MRI obtained and shows large joint effusion, marrow edema involving the tibia and fibula centrally in the areas of the origin/attachment of the ACL  Reached to Dr. Coon to determine if further surgical intervention warranted  Patient now currently NPO with plans for repeat I/D      Consultants/Specialty  Infectious disease  Orthopedics    Code Status  Full    Disposition  To be determined    Review of Systems  Review of Systems   Constitutional: Negative for chills, diaphoresis, fever, malaise/fatigue and weight loss.   Cardiovascular: Negative for chest pain, palpitations, orthopnea and claudication.   Gastrointestinal: Negative for abdominal pain, constipation, diarrhea, heartburn, nausea and vomiting.   Genitourinary: Negative for dysuria and urgency.   Musculoskeletal: Positive for joint pain, myalgias and neck pain. Negative for back pain.   Skin: Negative for rash.   All other systems reviewed and are negative.       Physical Exam  Temp:  [37.2 °C (99 °F)-37.4 °C (99.4 °F)] 37.3 °C (99.2 °F)  Pulse:  [] 104  Resp:  [17-18] 17  BP: (119-127)/(69-79) 119/69  SpO2:  [94 %-97 %] 95 %    Physical Exam   Constitutional: He is oriented to person, place, and time. He appears well-developed and  well-nourished. No distress.   HENT:   Head: Normocephalic and atraumatic.   Eyes: Pupils are equal, round, and reactive to light. EOM are normal.   Neck: Neck supple.   Cardiovascular: Normal rate, regular rhythm and normal heart sounds.   Pulmonary/Chest: Effort normal and breath sounds normal. No respiratory distress. He has no wheezes. He has no rales.   Abdominal: Soft. He exhibits no distension. There is no tenderness.   Musculoskeletal:   Left knee CDI   Neurological: He is alert and oriented to person, place, and time. No cranial nerve deficit.   Skin: Skin is warm and dry.       Fluids    Intake/Output Summary (Last 24 hours) at 9/20/2019 1103  Last data filed at 9/20/2019 0900  Gross per 24 hour   Intake 480 ml   Output 2900 ml   Net -2420 ml       Laboratory  Recent Labs     09/18/19  0929 09/19/19  0115 09/20/19  0320   WBC 8.3 11.7* 8.6   RBC 4.52* 4.21* 4.03*   HEMOGLOBIN 13.6* 12.7* 11.9*   HEMATOCRIT 41.1* 37.9* 36.4*   MCV 90.9 90.0 90.3   MCH 30.1 30.2 29.5   MCHC 33.1* 33.5* 32.7*   RDW 43.4 42.8 43.5   PLATELETCT 418 595* 550*   MPV 10.0 9.5 9.2     Recent Labs     09/18/19  0929 09/19/19  0115   SODIUM 132* 132*   POTASSIUM 4.1 4.0   CHLORIDE 99 97   CO2 24 25   GLUCOSE 159* 125*   BUN 13 13   CREATININE 0.85 0.96   CALCIUM 8.9 8.8                   Imaging  MR-KNEE-WITH & W/O LEFT   Final Result      1.  Very limited study due to extensive patient motion artifact.      2.  Large joint effusion with synovitis. There are also punctate low signal foci within the joint fluid suggesting areas of gas. Consideration should be given for septic arthritis.      3.  Limited evaluation of the anterior cruciate ligament. The anterior cruciate ligament is ill-defined with diffuse increased signal possibly representing a high-grade partial-thickness tear with mucoid degeneration. The amount of motion artifact    precludes the ability to completely exclude complete disruption of the anterior cruciate ligament.       4.  Marrow edema involving the distal femur and proximal tibia in the areas of the origin and attachment of the anterior cruciate ligament. This may represent reactive change related to chronic tear and mucoid degeneration. Early osteomyelitis cannot    definitely be excluded.      5.  Edema and enhancement of the soft tissues posterior and lateral to the knee consistent with cellulitis.      6.  Skin staples anterior to the patella.      US-EXTREMITY VENOUS LOWER UNILAT LEFT   Final Result      IR-MIDLINE CATHETER INSERTION WO GUIDANCE > AGE 3   Final Result                  Ultrasound-guided midline placement performed by qualified nursing staff    as above.          IR-US GUIDED PIV   Final Result    Ultrasound-guided PERIPHERAL IV INSERTION performed by    qualified nursing staff as above.            US-EXTREMITY VENOUS LOWER UNILAT LEFT   Final Result      US-FOREIGN FILM ULTRASOUND   Final Result      IR-MIDLINE CATHETER INSERTION WO GUIDANCE > AGE 3    (Results Pending)        Assessment/Plan  * Septic arthritis of knee, left (HCC)- (present on admission)  Assessment & Plan  Left knee septic arthritis with greater than 100,000 WBC on arthrocentesis   Status post I&D 9/10 by Dr. Coon  IV abx per infectious disease--ceftriaxone added to vancomycin after patient respect fevers  Stop date 10/8  Pt/ot pain control --added baclofen 9/18  Need to follow arthrocentesis cultures at OSH  Follow CBC daily  Repeat venous Doppler given pain and edema  Repeat MRI with effusion --- ortho planning repeat I/D 9/20    HLD (hyperlipidemia)- (present on admission)  Assessment & Plan  Resume home medications when appropriate    Obesity- (present on admission)  Assessment & Plan  Encouraged weight loss       VTE prophylaxis: Enoxaparin

## 2019-09-20 NOTE — PROCEDURES
Vascular Access Team    Date of Insertion: 9/20/19  Arm Circumference: 37  Internal length: 17  External Length: 0  Vein Occupancy %: 29  Reason for Midline: IV abx  Labs: WBC 8.6, , BUN 13, Cr 0.96, GFR >60, INR 1.14    Orders confirmed, vessel patency confirmed with ultrasound. Risks and benefits of procedure explained to patient and education regarding line associated bloodstream infections provided. Questions answered.     Power Midline placed in LUE per licensed provider order with ultrasound guidance. 4 Fr, 1 lumen Power Midline placed in cephalic vein after 1 attempt(s). 2 mL of 1% lidocaine injected intradermally, 21 gauge microintroducer needle and modified Seldinger technique used. 17 cm catheter inserted with good blood return. Secured at 0 cm marker. Each lumen flushed without resistance with 10 mL 0.9% normal saline. Midline secured with Biopatch and Tegaderm.     Midline placement is confirmed by nurse using ultrasound and ability to flush and draw blood. Midline is appropriate for use at this time.  No X-ray is needed for placement confirmation. Pt tolerated procedure well.  Patient condition relayed to unit RN or ordering physician via this post procedure note in the EMR.     Ultrasound images uploaded to PACS and viewable in the EMR - yes  Ultrasound imaged printed and placed in paper chart - no     BARD Power Midline ref # [id number], Lot # [id number]

## 2019-09-20 NOTE — PROGRESS NOTES
Pharmacy Kinetics 53 y.o. male on vancomycin day # 10   2019    Currently on Vancomycin 1500 mg iv q12hr (0900 2100)  Provider specified end date: 10/08/19    Indication for Treatment: SSTI , Septic knee    Pertinent history per medical record: Admitted on 9/10/2019 for septic joint. I&D performed on 9/10 by Dr. Coon. Drain was removed on . Pt has ongoing low grade temperatures and knee pain. Repeat imaging ordered to r/o abscess or osteomyelitis.     Other antibiotics: ceftriaxone 2 g IV Q24H     Allergies: Patient has no known allergies.      List concerns for renal function: obesity (BMI 33.97 kg/m2)    Pertinent cultures to date:   09/10/19:PBCx2:NGTD  09/10/19:Lt Knee,WND:Rare Gram positive cocci.   19:PBCx2:NGTD    MRSA nares swab if pneumonia is a concern (ordered/positive/negative/n-a): NA    Recent Labs     19  0929 19  0115 19  0320   WBC 8.3 11.7* 8.6     Recent Labs     19  0929 19  0115   BUN 13 13   CREATININE 0.85 0.96     Recent Labs     19  0830   VANCOTROUGH 14.9       Intake/Output Summary (Last 24 hours) at 2019 0900  Last data filed at 2019 0253  Gross per 24 hour   Intake 840 ml   Output 2100 ml   Net -1260 ml      /76   Pulse 94   Temp 37.2 °C (99 °F) (Temporal)   Resp 18   Ht 1.829 m (6')   Wt 113.6 kg (250 lb 7.1 oz)   SpO2 94%  Temp (24hrs), Av.3 °C (99.1 °F), Min:37.2 °C (99 °F), Max:37.4 °C (99.4 °F)      A/P   1. Vancomycin dose change: No change   2. Next vancomycin level: 19@0830  3. Goal trough: 12-16 mcg/mL   4. Comments: Wbc trended down, cx listed above. Tmax 99.4 (has been taking APAP). Last renal indices stable , vancomycin level ordered. ID following , continue same.     Jermaine Valero PharmD BCPS

## 2019-09-20 NOTE — PROGRESS NOTES
Progress Note               Author: Brad Coon Date & Time created: 2019  12:00 PM     Interval History:  Painful knee    Review of Systems:  ROS    Physical Exam:  Physical Exam    Labs:          Recent Labs     19  011   SODIUM 132* 132*   POTASSIUM 4.1 4.0   CHLORIDE 99 97   CO2 24 25   BUN 13 13   CREATININE 0.85 0.96   MAGNESIUM  --  2.3   PHOSPHORUS  --  3.4   CALCIUM 8.9 8.8     Recent Labs     19  0115   GLUCOSE 159* 125*     Recent Labs     19  0919  0115 19  0320   RBC 4.52* 4.21* 4.03*   HEMOGLOBIN 13.6* 12.7* 11.9*   HEMATOCRIT 41.1* 37.9* 36.4*   PLATELETCT 418 595* 550*     Recent Labs     195 19  0320   WBC 8.3 11.7* 8.6     Hemodynamics:  Temp (24hrs), Av.3 °C (99.2 °F), Min:37.2 °C (99 °F), Max:37.4 °C (99.4 °F)  Temperature: 37.3 °C (99.2 °F)  Pulse  Av.7  Min: 63  Max: 106   Blood Pressure: 119/69     Respiratory:    Respiration: 17, Pulse Oximetry: 95 %        RUL Breath Sounds: Clear, RML Breath Sounds: Clear, RLL Breath Sounds: Clear, JARVIS Breath Sounds: Clear, LLL Breath Sounds: Clear  Fluids:    Intake/Output Summary (Last 24 hours) at 2019 1200  Last data filed at 2019 0900  Gross per 24 hour   Intake 480 ml   Output 2900 ml   Net -2420 ml        GI/Nutrition:  Orders Placed This Encounter   Procedures   • Diet NPO     Standing Status:   Standing     Number of Occurrences:   1     Order Specific Question:   Restrict to:     Answer:   Sips with Medications [3]     Medical Decision Making, by Problem:  Active Hospital Problems    Diagnosis   • *Septic arthritis of knee, left (HCC) [M00.9]   • Lactic acidosis [E87.2]   • Acute hyperglycemia [R73.9]   • Obesity [E66.9]   • HLD (hyperlipidemia) [E78.5]       Plan:  MRI with large effusion.  Recommend repeat I and D.  NPO.      Quality-Core Measures

## 2019-09-20 NOTE — ANESTHESIA PREPROCEDURE EVALUATION
Relevant Problems   Other   (+) Septic arthritis of knee, left (HCC)       Physical Exam    Anesthesia Plan    ASA 2       Plan - general       Airway plan will be ETT        Induction: intravenous and rapid sequence    Postoperative Plan: Postoperative administration of opioids is intended.    Pertinent diagnostic labs and testing reviewed    Informed Consent:    Anesthetic plan and risks discussed with patient.    Use of blood products discussed with: patient whom consented to blood products.

## 2019-09-20 NOTE — CARE PLAN
Problem: Communication  Goal: The ability to communicate needs accurately and effectively will improve  Outcome: PROGRESSING AS EXPECTED     Problem: Safety  Goal: Will remain free from falls  Outcome: PROGRESSING AS EXPECTED     Pt uses call bell effectively, makes needs known; safety care staff at bedside, pt has remained free from falls

## 2019-09-20 NOTE — ANESTHESIA PREPROCEDURE EVALUATION
Relevant Problems   Other   (+) Septic arthritis of knee, left (HCC)       Physical Exam    Airway   Mallampati: II  TM distance: >3 FB  Neck ROM: full       Cardiovascular - normal exam  Rhythm: regular  Rate: normal     Dental - normal exam         Pulmonary - normal exam  Breath sounds clear to auscultation     Abdominal    Neurological - normal exam                 Anesthesia Plan    ASA 2- EMERGENT   ASA physical status emergent criteria: sepsis    Plan - general       Airway plan will be ETT        Induction: intravenous and rapid sequence    Postoperative Plan: Postoperative administration of opioids is intended.    Pertinent diagnostic labs and testing reviewed    Informed Consent:    Anesthetic plan and risks discussed with patient.    Use of blood products discussed with: patient whom consented to blood products.

## 2019-09-20 NOTE — PROGRESS NOTES
Infectious Disease Progress Note    Author: Yumiko Da Silva M.D. Date & Time of service: 2019  10:35 AM    Chief Complaint:  FU Left septic knee     Interval History:   Tmax 100.8 WBC 8.9 Ongoing left knee pain. Unable to stand or ambulate. Tolerating IV abx without issues.   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 Ongoing left knee pain 8/10 in severity with pain meds.    T-max 99.8 WBC 8.3 patient is doing well without any new issues to report.  PICC line placed today   T-max 99.9 WBC 11.7 ongoing left knee pain associated with swelling.  Denies any cough, sore throat, dysuria or diarrhea   afebrile WBC 8.6 patient's midline was dislodged overnight.  No changes in pain or swelling.  MRI reveals significant knee effusion    Review of Systems:  Review of Systems   Constitutional: Negative for chills, fever and malaise/fatigue.   Respiratory: Negative for cough and shortness of breath.    Gastrointestinal: Negative for abdominal pain, constipation, diarrhea, nausea and vomiting.   Musculoskeletal: Positive for joint pain.        Left knee   Neurological: Negative for dizziness and headaches.   All other systems reviewed and are negative.      Hemodynamics:  Temp (24hrs), Av.3 °C (99.2 °F), Min:37.2 °C (99 °F), Max:37.4 °C (99.4 °F)  Temperature: 37.3 °C (99.2 °F)  Pulse  Av.7  Min: 63  Max: 106   Blood Pressure: 119/69       Physical Exam:  Physical Exam   Constitutional: He is oriented to person, place, and time. He appears well-developed and well-nourished.   HENT:   Head: Normocephalic and atraumatic.   Fair dentition   Eyes: Pupils are equal, round, and reactive to light. Conjunctivae and EOM are normal.   Cardiovascular: Normal rate, regular rhythm and normal heart sounds.   Pulmonary/Chest: Effort normal and breath sounds normal.   Abdominal: Soft. Bowel sounds are normal. He exhibits no distension. There is tenderness. There is  no rebound and no guarding.   Musculoskeletal: He exhibits edema and tenderness.   Left knee surgical site with staples in place, well approximated. +Edema extending down to the ankle.  No erythema. No draingage    Right upper extremity midline-nontender, no surrounding erythema   Neurological: He is alert and oriented to person, place, and time.   Skin: Skin is warm and dry.   Multiple tattoos   Psychiatric: He has a normal mood and affect. His behavior is normal.       Meds:    Current Facility-Administered Medications:   •  cefTRIAXone (ROCEPHIN) IV  •  MD Alert...Vancomycin per Pharmacy  •  baclofen  •  enoxaparin  •  acetaminophen  •  vancomycin  •  HYDROmorphone  •  atorvastatin  •  senna-docusate **AND** polyethylene glycol/lytes **AND** magnesium hydroxide **AND** bisacodyl  •  Notify provider if pain remains uncontrolled **AND** Use the numeric rating scale (NRS-11) on regular floors and Critical-Care Pain Observation Tool (CPOT) on ICUs/Trauma to assess pain **AND** Pulse Ox (Oximetry) **AND** Pharmacy Consult Request **AND** If patient difficult to arouse and/or has respiratory depression, stop any opiates that are currently infusing and call a Rapid Response. **AND** oxyCODONE immediate-release **AND** oxyCODONE immediate-release **AND** morphine injection  •  ondansetron  •  ondansetron  •  promethazine  •  promethazine  •  prochlorperazine    Labs:  Recent Labs     09/18/19 0929 09/19/19  0115 09/20/19  0320   WBC 8.3 11.7* 8.6   RBC 4.52* 4.21* 4.03*   HEMOGLOBIN 13.6* 12.7* 11.9*   HEMATOCRIT 41.1* 37.9* 36.4*   MCV 90.9 90.0 90.3   MCH 30.1 30.2 29.5   RDW 43.4 42.8 43.5   PLATELETCT 418 595* 550*   MPV 10.0 9.5 9.2     Recent Labs     09/18/19 0929 09/19/19  0115   SODIUM 132* 132*   POTASSIUM 4.1 4.0   CHLORIDE 99 97   CO2 24 25   GLUCOSE 159* 125*   BUN 13 13     Recent Labs     09/18/19 0929 09/19/19  0115   CREATININE 0.85 0.96       Imaging:  Ir-us Guided Piv    Result Date:  9/15/2019  EXAMINATION:                                                                    HISTORY/REASON FOR EXAM:  Ultrasound Guided PIV.  TECHNIQUE/EXAM DESCRIPTION AND NUMBER OF VIEWS:  Peripheral IV insertion with ultrasound guidance.  The procedure was prepared using maximal sterile barrier technique including sterile gown, mask, cap, and donning of sterile gloves following appropriate hand hygiene and/or sterile scrub. Patient skin site was prepped with 2% Chlorhexidine solution.   FINDINGS: Peripheral IV insertion with Ultrasound Guidance was performed by qualified imaging nursing staff without the assistance of a Radiologist.      Ultrasound-guided PERIPHERAL IV INSERTION performed by qualified nursing staff as above.     Us-extremity Venous Lower Unilat Left    Result Date: 9/15/2019   Vascular Laboratory  CONCLUSIONS  No prior study is available for comparison.  Normal left lower extremity superficial and deep venous examination.  [facility]  Exam Date:     09/15/2019 08:40  Room #:     Inpatient  Priority:     Routine  Ht (in):             Wt (lb):  Ordering Physician:        MARIELOS OLIVARES  Referring Physician:       [id number] Moore  Sonographer:               Lucila Wilson RVT, RDCS  Study Type:                Complete Unilateral  Technical Quality:         Good  Age:    53    Gender:     M  MRN:    [id number]  :    1965      BSA:  Indications:     Pain in Limb, Edema  CPT Codes:       46769  ICD Codes:       729.5  782.3  History:         Septic left knee with pain and swelling  Limitations:  PROCEDURES:  Left lower extremity venous duplex imaging.  The following venous structures were evaluated: common femoral, profunda  femoral, greater saphenous, femoral, popliteal , peroneal and posterior  tibial veins.  Serial compression, augmentation maneuvers,  color and spectral Doppler  flow evaluations were performed.  FINDINGS:  Left lower extremity -  Complete  "color filling and compressibility with normal venous flow dynamics  including spontaneous flow, response to augmentation maneuvers, and  respiratory phasicity.  No superficial or deep venous thrombosis.  Flow was evaluated in the contralateral common femoral vein and normal  venous flow dynamics including spontaneous flow, respiratory phasic  variation and augmentation were demonstrated.  Kwan Atkins MD  (Electronically Signed)  Final Date:      15 September 2019                   09:53      Micro:  Results     Procedure Component Value Units Date/Time    CULTURE WOUND W/ GRAM STAIN [[id number]] Collected:  09/10/19 2309    Order Status:  Completed Specimen:  Wound Updated:  09/20/19 0723     Significant Indicator NEG     Source WND     Site Left Knee     Culture Result No growth at 9 days.     Gram Stain Result Many WBCs.  Rare Gram positive cocci.      Narrative:       Surgery - swabs received    Anaerobic Culture [[id number]] Collected:  09/10/19 2309    Order Status:  Completed Specimen:  Wound Updated:  09/20/19 0723     Significant Indicator NEG     Source WND     Site Left Knee     Culture Result Culture in progress.    Narrative:       Surgery - swabs received    BLOOD CULTURE [[id number]] Collected:  09/16/19 1006    Order Status:  Completed Specimen:  Blood from Peripheral Updated:  09/17/19 0829     Significant Indicator NEG     Source BLD     Site PERIPHERAL     Culture Result No Growth  Note: Blood cultures are incubated for 5 days and  are monitored continuously.Positive blood cultures  are called to the RN and reported as soon as  they are identified.      Narrative:       Per Hospital Policy: Only change Specimen Src: to \"Line\" if  specified by physician order.  Right Hand    BLOOD CULTURE [[id number]] Collected:  09/16/19 1006    Order Status:  Completed Specimen:  Blood from Peripheral Updated:  09/17/19 0829     Significant Indicator NEG     Source BLD     Site PERIPHERAL     Culture Result " "No Growth  Note: Blood cultures are incubated for 5 days and  are monitored continuously.Positive blood cultures  are called to the RN and reported as soon as  they are identified.      Narrative:       Per Hospital Policy: Only change Specimen Src: to \"Line\" if  specified by physician order.  Left Hand    Blood Culture [[id number]]     Order Status:  Canceled Specimen:  Blood from Peripheral     Blood Culture [[id number]] Collected:  09/10/19 2225    Order Status:  Completed Specimen:  Blood from Peripheral Updated:  09/16/19 0100     Significant Indicator NEG     Source BLD     Site PERIPHERAL     Culture Result No growth after 5 days of incubation.    Narrative:       From different peripheral sites, if not done within the last  24 hours (Per Hospital Policy: Only change specimen source to  \"Line\" if specified by physician order)  Left AC    Blood Culture [[id number]] Collected:  09/10/19 2225    Order Status:  Completed Specimen:  Blood from Peripheral Updated:  09/16/19 0100     Significant Indicator NEG     Source BLD     Site PERIPHERAL     Culture Result No growth after 5 days of incubation.    Narrative:       From different peripheral sites, if not done within the last  24 hours (Per Hospital Policy: Only change specimen source to  \"Line\" if specified by physician order)  Left Hand          Assessment:  Active Hospital Problems    Diagnosis   • *Septic arthritis of knee, left (HCC) [M00.9]       ASSESSMENT/PLAN:      Deanna Moe is a 53 y.o.  admitted 9/10/2019. Pt has no significant past medical history.  He is in the alf system and states that approximately 1 month ago he \"bumped\" his knee.  Since that time he had slowly progressive edema, redness and pain.  Symptoms progressed until just prior to admit when he had severe pain edema and was unable to ambulate.  He was initially brought to an outside facility ([facility] in [place]) and per notes arthrocentesis revealed markedly elevated WBC count " and initial Gram stain with GPC's.  Per notes the outside facility arthrocentesis with WBC neutral count of 112,000.  He went to the OR on 9/10 for washout of the knee.     Left knee septic arthritis  Persistent low-grade temperatures  No leukocytosis  Injury some weeks ago and progressed, no prior his of knee injury and native joint   Arthrocentesis at [facility] with WBC of 112,000 confirmed verbally and gram stain with rare GPCs, no growth on culture and final at 72 hours.  They have no remaining specimen.  S/p I&D with synovectomy on 9/10. A significant amount of purulent fluid seen in the joint and extensive  synovitis per OP note  OR cultures with GPCs and no growth (on abx prior)  Doppler US - neg for DVT  Microbiology holding cultures for 14 days  Continue IV vancomycin and ceftriaxone 2 g daily  Monitor renal function and vanco trough  Vanco trough 14.9 on 9/19  Plan for 4 weeks of IV abx from 9/10  Stop date 10/08/19 pending clinical improvement  MRI on 9/19 + large joint effusion  Needs reevaluation from Ortho given MRI findings and lack of significant improvement    Ongoing fevers  Low-grade temperatures. Stable  T-max 99.9  Blood cultures 9/16 - NGTD  On abx above    I have performed a physical exam and reviewed and updated ROS and plan today 9/20/2019.  In review of yesterday's note 9/19/2019, there are no changes except as documented above.    Plan of care discussed with Reinaldo DELEON

## 2019-09-20 NOTE — PROGRESS NOTES
Case D/w Dr. Coon and Dr. Sylvester, RN and patient  L knee quite swollen and warm.  Incision is clean dry and well-approximated  MR - large effusion    A/ L knee effusion, POD#10 S/P I+D L knee  P/ OR today for repeat procedure with Dr. Sylvester

## 2019-09-20 NOTE — PROGRESS NOTES
Came to bedside to assess midline which was reported as leaking.  Upon assessment, dressing was saturated and leaking when flushed.  Dressing had been extensively reinforced with surgical tape.  When dressing was removed by this RN, the midline catheter had already been removed beneath the dressing.  Tip was intact and no bleeding from previous insertion site.  Patient noted that he had been having nightmares and was turning frequently last night and may have dislodged the line.  No notes from NOC RN on how the midline was removed while the dressing remained intact.    We will place a new midline as the patient will still need IV access for abx.

## 2019-09-21 PROBLEM — E87.20 LACTIC ACIDOSIS: Status: RESOLVED | Noted: 2019-09-11 | Resolved: 2019-09-21

## 2019-09-21 PROBLEM — E87.1 HYPONATREMIA: Status: ACTIVE | Noted: 2019-09-21

## 2019-09-21 PROBLEM — D75.839 THROMBOCYTOSIS: Status: ACTIVE | Noted: 2019-09-21

## 2019-09-21 PROBLEM — D64.9 NORMOCYTIC ANEMIA: Status: ACTIVE | Noted: 2019-09-21

## 2019-09-21 LAB
ANION GAP SERPL CALC-SCNC: 9 MMOL/L (ref 0–11.9)
BACTERIA BLD CULT: NORMAL
BACTERIA BLD CULT: NORMAL
BUN SERPL-MCNC: 12 MG/DL (ref 8–22)
CALCIUM SERPL-MCNC: 8.8 MG/DL (ref 8.5–10.5)
CHLORIDE SERPL-SCNC: 99 MMOL/L (ref 96–112)
CO2 SERPL-SCNC: 25 MMOL/L (ref 20–33)
CREAT SERPL-MCNC: 0.87 MG/DL (ref 0.5–1.4)
ERYTHROCYTE [DISTWIDTH] IN BLOOD BY AUTOMATED COUNT: 42.4 FL (ref 35.9–50)
GLUCOSE SERPL-MCNC: 110 MG/DL (ref 65–99)
GRAM STN SPEC: NORMAL
HCT VFR BLD AUTO: 36.1 % (ref 42–52)
HGB BLD-MCNC: 12.1 G/DL (ref 14–18)
MAGNESIUM SERPL-MCNC: 2.1 MG/DL (ref 1.5–2.5)
MCH RBC QN AUTO: 29.7 PG (ref 27–33)
MCHC RBC AUTO-ENTMCNC: 33.5 G/DL (ref 33.7–35.3)
MCV RBC AUTO: 88.7 FL (ref 81.4–97.8)
OSMOLALITY SERPL: 285 MOSM/KG H2O (ref 278–298)
PHOSPHATE SERPL-MCNC: 4 MG/DL (ref 2.5–4.5)
PLATELET # BLD AUTO: 639 K/UL (ref 164–446)
PMV BLD AUTO: 9.1 FL (ref 9–12.9)
POTASSIUM SERPL-SCNC: 4.1 MMOL/L (ref 3.6–5.5)
RBC # BLD AUTO: 4.07 M/UL (ref 4.7–6.1)
RHODAMINE-AURAMINE STN SPEC: NORMAL
SIGNIFICANT IND 70042: NORMAL
SITE SITE: NORMAL
SODIUM SERPL-SCNC: 133 MMOL/L (ref 135–145)
SOURCE SOURCE: NORMAL
VANCOMYCIN TROUGH SERPL-MCNC: 10.6 UG/ML (ref 10–20)
WBC # BLD AUTO: 8.9 K/UL (ref 4.8–10.8)

## 2019-09-21 PROCEDURE — 700111 HCHG RX REV CODE 636 W/ 250 OVERRIDE (IP): Performed by: INTERNAL MEDICINE

## 2019-09-21 PROCEDURE — 700102 HCHG RX REV CODE 250 W/ 637 OVERRIDE(OP): Performed by: HOSPITALIST

## 2019-09-21 PROCEDURE — 80048 BASIC METABOLIC PNL TOTAL CA: CPT

## 2019-09-21 PROCEDURE — 84100 ASSAY OF PHOSPHORUS: CPT

## 2019-09-21 PROCEDURE — 700111 HCHG RX REV CODE 636 W/ 250 OVERRIDE (IP): Performed by: HOSPITALIST

## 2019-09-21 PROCEDURE — A9270 NON-COVERED ITEM OR SERVICE: HCPCS | Performed by: INTERNAL MEDICINE

## 2019-09-21 PROCEDURE — A9270 NON-COVERED ITEM OR SERVICE: HCPCS | Performed by: HOSPITALIST

## 2019-09-21 PROCEDURE — 80202 ASSAY OF VANCOMYCIN: CPT

## 2019-09-21 PROCEDURE — 85027 COMPLETE CBC AUTOMATED: CPT

## 2019-09-21 PROCEDURE — 700105 HCHG RX REV CODE 258: Performed by: HOSPITALIST

## 2019-09-21 PROCEDURE — 700102 HCHG RX REV CODE 250 W/ 637 OVERRIDE(OP): Performed by: INTERNAL MEDICINE

## 2019-09-21 PROCEDURE — 99232 SBSQ HOSP IP/OBS MODERATE 35: CPT | Performed by: HOSPITALIST

## 2019-09-21 PROCEDURE — 36415 COLL VENOUS BLD VENIPUNCTURE: CPT

## 2019-09-21 PROCEDURE — 99232 SBSQ HOSP IP/OBS MODERATE 35: CPT | Performed by: INTERNAL MEDICINE

## 2019-09-21 PROCEDURE — 700105 HCHG RX REV CODE 258: Performed by: INTERNAL MEDICINE

## 2019-09-21 PROCEDURE — 83930 ASSAY OF BLOOD OSMOLALITY: CPT

## 2019-09-21 PROCEDURE — 770001 HCHG ROOM/CARE - MED/SURG/GYN PRIV*

## 2019-09-21 PROCEDURE — 83735 ASSAY OF MAGNESIUM: CPT

## 2019-09-21 RX ADMIN — ACETAMINOPHEN 650 MG: 325 TABLET, FILM COATED ORAL at 20:49

## 2019-09-21 RX ADMIN — OXYCODONE HYDROCHLORIDE 10 MG: 10 TABLET ORAL at 12:45

## 2019-09-21 RX ADMIN — OXYCODONE HYDROCHLORIDE 10 MG: 10 TABLET ORAL at 09:12

## 2019-09-21 RX ADMIN — CEFTRIAXONE SODIUM 2 G: 2 INJECTION, POWDER, FOR SOLUTION INTRAMUSCULAR; INTRAVENOUS at 04:25

## 2019-09-21 RX ADMIN — VANCOMYCIN HYDROCHLORIDE 1600 MG: 500 INJECTION, POWDER, LYOPHILIZED, FOR SOLUTION INTRAVENOUS at 19:37

## 2019-09-21 RX ADMIN — ACETAMINOPHEN 650 MG: 325 TABLET, FILM COATED ORAL at 09:12

## 2019-09-21 RX ADMIN — ENOXAPARIN SODIUM 40 MG: 100 INJECTION SUBCUTANEOUS at 04:26

## 2019-09-21 RX ADMIN — OXYCODONE HYDROCHLORIDE 10 MG: 10 TABLET ORAL at 04:25

## 2019-09-21 RX ADMIN — SENNOSIDES, DOCUSATE SODIUM 2 TABLET: 50; 8.6 TABLET, FILM COATED ORAL at 17:48

## 2019-09-21 RX ADMIN — OXYCODONE HYDROCHLORIDE 10 MG: 10 TABLET ORAL at 17:48

## 2019-09-21 RX ADMIN — OXYCODONE HYDROCHLORIDE 10 MG: 10 TABLET ORAL at 20:50

## 2019-09-21 RX ADMIN — VANCOMYCIN HYDROCHLORIDE 1500 MG: 500 INJECTION, POWDER, LYOPHILIZED, FOR SOLUTION INTRAVENOUS at 09:07

## 2019-09-21 RX ADMIN — ATORVASTATIN CALCIUM 20 MG: 20 TABLET, FILM COATED ORAL at 17:48

## 2019-09-21 ASSESSMENT — ENCOUNTER SYMPTOMS
NERVOUS/ANXIOUS: 0
CHILLS: 0
DIAPHORESIS: 0
ABDOMINAL PAIN: 0
CONSTIPATION: 0
DEPRESSION: 0
SHORTNESS OF BREATH: 0
FOCAL WEAKNESS: 1
COUGH: 0
TINGLING: 1
NAUSEA: 0
MYALGIAS: 0
WEAKNESS: 0
HEADACHES: 0
VOMITING: 0
DIARRHEA: 0
SPEECH CHANGE: 0
INSOMNIA: 0
DIZZINESS: 0
SENSORY CHANGE: 0
PALPITATIONS: 0
FEVER: 0

## 2019-09-21 ASSESSMENT — COGNITIVE AND FUNCTIONAL STATUS - GENERAL
DRESSING REGULAR LOWER BODY CLOTHING: A LITTLE
SUGGESTED CMS G CODE MODIFIER DAILY ACTIVITY: CJ
CLIMB 3 TO 5 STEPS WITH RAILING: A LITTLE
MOVING FROM LYING ON BACK TO SITTING ON SIDE OF FLAT BED: A LITTLE
WALKING IN HOSPITAL ROOM: A LITTLE
TURNING FROM BACK TO SIDE WHILE IN FLAT BAD: A LITTLE
MOVING TO AND FROM BED TO CHAIR: A LITTLE
TOILETING: A LITTLE
SUGGESTED CMS G CODE MODIFIER MOBILITY: CK
STANDING UP FROM CHAIR USING ARMS: A LITTLE
DAILY ACTIVITIY SCORE: 22
MOBILITY SCORE: 18

## 2019-09-21 NOTE — OR NURSING
Pt states pain is a 9 but cannot keep eyes open or finish a sentence. Vss. Will continue to monitor.

## 2019-09-21 NOTE — ANESTHESIA QCDR
2019 [facility] Clinical Data Registry (for Quality Improvement)     Postoperative nausea/vomiting risk protocol (Adult = 18 yrs and Pediatric 3-17 yrs)- (430 and 463)  General inhalation anesthetic (NOT TIVA) with PONV risk factors: No  Provision of anti-emetic therapy with at least 2 different classes of agents: N/A  Patient DID NOT receive anti-emetic therapy and reason is documented in Medical Record: N/A    Multimodal Pain Management- (AQI59)  Patient undergoing Elective Surgery (i.e. Outpatient, or ASC, or Prescheduled Surgery prior to Hospital Admission): No  Use of Multimodal Pain Management, two or more drugs and/or interventions, NOT including systemic opioids: N/A  Exception: Documented allergy to multiple classes of analgesics: N/A    PACU assessment of acute postoperative pain prior to Anesthesia Care End- Applies to Patients Age = 18- (ABG7)  Initial PACU pain score is which of the following: < 7/10  Patient unable to report pain score: N/A    Post-anesthetic transfer of care checklist/protocol to PACU/ICU- (426 and 427)  Upon conclusion of case, patient transferred to which of the following locations: PACU/Non-ICU  Use of transfer checklist/protocol: Yes  Exclusion: Service Performed in Patient Hospital Room (and thus did not require transfer): N/A    PACU Reintubation- (AQI31)  General anesthesia requiring endotracheal intubation (ETT) along with subsequent extubation in OR or PACU: No  Required reintubation in the PACU: N/A  Extubation was a planned trial documented in the medical record prior to removal of the original airway device: N/A    Unplanned admission to ICU related to anesthesia service up through end of PACU care- (MD51)  Unplanned admission to ICU (not initially anticipated at anesthesia start time): No

## 2019-09-21 NOTE — CARE PLAN
Problem: Communication  Goal: The ability to communicate needs accurately and effectively will improve  Outcome: PROGRESSING AS EXPECTED     Problem: Safety  Goal: Will remain free from injury  Outcome: PROGRESSING AS EXPECTED     Problem: Infection  Goal: Will remain free from infection  Outcome: PROGRESSING AS EXPECTED     Problem: Pain Management  Goal: Pain level will decrease to patient's comfort goal  Outcome: PROGRESSING AS EXPECTED

## 2019-09-21 NOTE — ANESTHESIA POSTPROCEDURE EVALUATION
Patient: Four Edalma    Procedure Summary     Date:  09/20/19 Room / Location:  [place] [address] / SURGERY [facility]    Anesthesia Start:  1741 Anesthesia Stop:  1814    Procedure:  IRRIGATION AND DEBRIDEMENT, WOUND - Knee (Left Knee) Diagnosis:  (infected left knee)    Surgeon:  Sergio Sylvester M.D. Responsible Provider:  Iker Montes M.D.    Anesthesia Type:  general ASA Status:  2          Final Anesthesia Type: general  Last vitals  BP   Blood Pressure: 146/79    Temp   37.1 °C (98.8 °F)    Pulse   Pulse: 92   Resp   17    SpO2   95 %      Anesthesia Post Evaluation    Patient location during evaluation: PACU  Patient participation: complete - patient participated  Level of consciousness: awake and alert  Pain score: 1    Airway patency: patent  Anesthetic complications: no  Cardiovascular status: hemodynamically stable  Respiratory status: acceptable  Hydration status: euvolemic    PONV: none           Nurse Pain Score: 7 (NPRS)

## 2019-09-21 NOTE — OP REPORT
DATE OF SERVICE:  09/20/2019    PREOPERATIVE DIAGNOSIS:  Left septic knee.    POSTOPERATIVE DIAGNOSIS:  Left septic knee.    PROCEDURE:  Irrigation and debridement, left septic knee.    SURGEON:  Sergio Sylvester MD    ASSISTANT:  Franklyn Rachel PA-C    ESTIMATED BLOOD LOSS:  Minimal.    INDICATIONS:  This is a 53-year-old prisoner status post incision and   drainage, left knee 10 days ago who has developed a reaccumulation of fluid   and increasing pain and white count.  As a result, he was consented for repeat   irrigation and debridement.  Risks and benefits were discussed, which include   but not limited to bleeding, infection, neurovascular damage, pain,   stiffness, and need for further surgery.  They understand all these risks and   wished to proceed.    DESCRIPTION OF PROCEDURE:  The patient was sedated with LMA anesthesia and   administered preoperative antibiotics.  Left leg was prepped in the usual   sterile fashion.  His previous incision was reopened.  A large amount of   hematoma in his articular surface was entered.  A large amount of hematoma was   encountered and this was sent for culture and sensitivity.  The remainder of   the synovectomy was performed with a knife and a rongeur down to healthy   tissue.  Wounds were then irrigated with pulsatile lavage and closed in layers   over a drain.  Sterile dressing was applied.  Patient tolerated the procedure   well.    POSTOPERATIVE PLAN:  The patient will be weightbearing as tolerated, admitted   for perioperative antibiotics, DVT prophylaxis, and pain control.       ____________________________________     SERGIO SYLVESTER MD    SIMRAN / NTS    DD:  09/20/2019 18:14:33  DT:  09/20/2019 18:38:51    D#:  [id number]  Job#:  [id number]

## 2019-09-21 NOTE — ASSESSMENT & PLAN NOTE
-Anemia work-up largely unremarkable, though he does have a B12 level that is on the low end of normal.  Will replete IM x1.  Recommend rechecking level outpatient in approximately 1-2 months.  -No evidence of blood loss at present.  -H/H stable.

## 2019-09-21 NOTE — CARE PLAN
Problem: Communication  Goal: The ability to communicate needs accurately and effectively will improve  Outcome: PROGRESSING AS EXPECTED     Problem: Safety  Goal: Will remain free from injury  Outcome: PROGRESSING AS EXPECTED     Problem: Respiratory:  Goal: Respiratory status will improve  Outcome: PROGRESSING AS EXPECTED   Patient able to express basic needs. No falls while here. Respt within normal limits.

## 2019-09-21 NOTE — PROGRESS NOTES
Pharmacy Kinetics 53 y.o. male on vancomycin day # 11     2019    Currently on Vancomycin 1500 mg iv q12hr (0900 2100)  Provider specified end date: 10/08/19     Indication for Treatment: SSTI , Septic knee     Pertinent history per medical record: Admitted on 9/10/2019 for septic joint. I&D performed on 9/10 by Dr. Coon. Drain was removed on . Pt has ongoing low grade temperatures and knee pain. Repeat imaging ordered to r/o abscess or osteomyelitis.     Other antibiotics: ceftriaxone 2 g IV Q24H     Allergies: Patient has no known allergies.      List concerns for renal function: obesity (BMI 33.97 kg/m2)     Pertinent cultures to date:   09/10/19:PBCx2:NGTD  09/10/19:Lt Knee,WND:Rare Gram positive cocci.   19:PBCx2:NGTD  19:Lt Knee,TISS:NGTD     MRSA nares swab if pneumonia is a concern (ordered/positive/negative/n-a): NA      Recent Labs     19  0115 19  0320 19  0904   WBC 11.7* 8.6 8.9     Recent Labs     19  0115   BUN 13   CREATININE 0.96     Recent Labs     19  0830   VANCOTROUGH 14.9       Intake/Output Summary (Last 24 hours) at 2019 0822  Last data filed at 2019 0600  Gross per 24 hour   Intake 420 ml   Output 1770 ml   Net -1350 ml      /69   Pulse 93   Temp 37.1 °C (98.7 °F) (Temporal)   Resp 17   Ht 1.829 m (6')   Wt 113.6 kg (250 lb 7.1 oz)   SpO2 91%  Temp (24hrs), Av.2 °C (98.9 °F), Min:36.4 °C (97.6 °F), Max:37.8 °C (100.1 °F)      A/P        1. Vancomycin dose change: No change   2. Next vancomycin level: 19@0830  3. Goal trough: 12-16 mcg/mL   4. Comments: Wbc trended down, cx listed above. Tmax 100.1. Last renal indices stable (BMP ordered) , last vancomycin level @ goal. ID following , continue same.      Jermaine Valero PharmD BCPS     Update: 19@1711      2019 09:04   Bun 12   Creatinine 0.87   Vancomycin Trough 10.6       A/P        5. Vancomycin dose change: Vancomycin 1600 mg iv q12hr (0700  0297)  6. Next vancomycin level: 2-3 days   7. Goal trough: 12-16 mcg/mL   8. Comments: Level below goal , maintenance dose increased.      Jermaine GamezD BCPS

## 2019-09-21 NOTE — PROGRESS NOTES
Infectious Disease Progress Note    Author: Yumiko Da Silva M.D. Date & Time of service: 2019  2:29 PM    Chief Complaint:  FU Left septic knee     Interval History:   Tmax 100.8 WBC 8.9 Ongoing left knee pain. Unable to stand or ambulate. Tolerating IV abx without issues.   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 Ongoing left knee pain 8/10 in severity with pain meds.    T-max 99.8 WBC 8.3 patient is doing well without any new issues to report.  PICC line placed today   T-max 99.9 WBC 11.7 ongoing left knee pain associated with swelling.  Denies any cough, sore throat, dysuria or diarrhea   afebrile WBC 8.6 patient's midline was dislodged overnight.  No changes in pain or swelling.  MRI reveals significant knee effusion   Tmax 100 WBC 8.9 pt is s/p repeat I&D yesterday. Ongoing left knee pain and swelling. Hungry and wants to eat    Review of Systems:  Review of Systems   Constitutional: Negative for chills, fever and malaise/fatigue.   Respiratory: Negative for cough and shortness of breath.    Cardiovascular: Positive for leg swelling.   Gastrointestinal: Negative for abdominal pain, constipation, diarrhea, nausea and vomiting.   Musculoskeletal: Positive for joint pain.        Left knee   Neurological: Negative for dizziness and headaches.   All other systems reviewed and are negative.      Hemodynamics:  Temp (24hrs), Av.1 °C (98.8 °F), Min:36.4 °C (97.5 °F), Max:37.8 °C (100.1 °F)  Temperature: 36.4 °C (97.5 °F)  Pulse  Av  Min: 63  Max: 110   Blood Pressure: 107/71       Physical Exam:  Physical Exam   Constitutional: He is oriented to person, place, and time. He appears well-developed and well-nourished.   HENT:   Head: Normocephalic and atraumatic.   Fair dentition   Eyes: Pupils are equal, round, and reactive to light. Conjunctivae and EOM are normal.   Cardiovascular: Normal rate, regular rhythm and normal heart  sounds.   Pulmonary/Chest: Effort normal and breath sounds normal.   Abdominal: Soft. Bowel sounds are normal. He exhibits no distension. There is tenderness. There is no rebound and no guarding.   Musculoskeletal: He exhibits edema and tenderness.   Left knee dressed with hemovac in place. +LLE swelling    LUE midline-nontender, no surrounding erythema   Neurological: He is alert and oriented to person, place, and time.   Skin: Skin is warm and dry.   Multiple tattoos   Psychiatric: He has a normal mood and affect. His behavior is normal.       Meds:    Current Facility-Administered Medications:   •  cefTRIAXone (ROCEPHIN) IV  •  MD Alert...Vancomycin per Pharmacy  •  baclofen  •  enoxaparin  •  acetaminophen  •  vancomycin  •  HYDROmorphone  •  atorvastatin  •  senna-docusate **AND** polyethylene glycol/lytes **AND** magnesium hydroxide **AND** bisacodyl  •  Notify provider if pain remains uncontrolled **AND** Use the numeric rating scale (NRS-11) on regular floors and Critical-Care Pain Observation Tool (CPOT) on ICUs/Trauma to assess pain **AND** Pulse Ox (Oximetry) **AND** Pharmacy Consult Request **AND** If patient difficult to arouse and/or has respiratory depression, stop any opiates that are currently infusing and call a Rapid Response. **AND** oxyCODONE immediate-release **AND** oxyCODONE immediate-release **AND** morphine injection  •  ondansetron  •  ondansetron  •  promethazine  •  promethazine  •  prochlorperazine    Labs:  Recent Labs     09/19/19  0115 09/20/19  0320 09/21/19  0904   WBC 11.7* 8.6 8.9   RBC 4.21* 4.03* 4.07*   HEMOGLOBIN 12.7* 11.9* 12.1*   HEMATOCRIT 37.9* 36.4* 36.1*   MCV 90.0 90.3 88.7   MCH 30.2 29.5 29.7   RDW 42.8 43.5 42.4   PLATELETCT 595* 550* 639*   MPV 9.5 9.2 9.1     Recent Labs     09/19/19  0115 09/21/19  0904   SODIUM 132* 133*   POTASSIUM 4.0 4.1   CHLORIDE 97 99   CO2 25 25   GLUCOSE 125* 110*   BUN 13 12     Recent Labs     09/19/19  0115 09/21/19  0904    CREATININE 0.96 0.87       Imaging:  Ir-us Guided Piv    Result Date: 9/15/2019  EXAMINATION:                                                                    HISTORY/REASON FOR EXAM:  Ultrasound Guided PIV.  TECHNIQUE/EXAM DESCRIPTION AND NUMBER OF VIEWS:  Peripheral IV insertion with ultrasound guidance.  The procedure was prepared using maximal sterile barrier technique including sterile gown, mask, cap, and donning of sterile gloves following appropriate hand hygiene and/or sterile scrub. Patient skin site was prepped with 2% Chlorhexidine solution.   FINDINGS: Peripheral IV insertion with Ultrasound Guidance was performed by qualified imaging nursing staff without the assistance of a Radiologist.      Ultrasound-guided PERIPHERAL IV INSERTION performed by qualified nursing staff as above.     Us-extremity Venous Lower Unilat Left    Result Date: 9/15/2019   Vascular Laboratory  CONCLUSIONS  No prior study is available for comparison.  Normal left lower extremity superficial and deep venous examination.  [facility]  Exam Date:     09/15/2019 08:40  Room #:     Inpatient  Priority:     Routine  Ht (in):             Wt (lb):  Ordering Physician:        MARIELOS OLIVARES  Referring Physician:       [id number] Moore  Sonographer:               Lucila Wilson RVT, RDCS  Study Type:                Complete Unilateral  Technical Quality:         Good  Age:    53    Gender:     M  MRN:    [id number]  :    1965      BSA:  Indications:     Pain in Limb, Edema  CPT Codes:       64187  ICD Codes:       729.5  782.3  History:         Septic left knee with pain and swelling  Limitations:  PROCEDURES:  Left lower extremity venous duplex imaging.  The following venous structures were evaluated: common femoral, profunda  femoral, greater saphenous, femoral, popliteal , peroneal and posterior  tibial veins.  Serial compression, augmentation maneuvers,  color and spectral Doppler  flow  "evaluations were performed.  FINDINGS:  Left lower extremity -  Complete color filling and compressibility with normal venous flow dynamics  including spontaneous flow, response to augmentation maneuvers, and  respiratory phasicity.  No superficial or deep venous thrombosis.  Flow was evaluated in the contralateral common femoral vein and normal  venous flow dynamics including spontaneous flow, respiratory phasic  variation and augmentation were demonstrated.  Kwan Atkins MD  (Electronically Signed)  Final Date:      15 September 2019                   09:53      Micro:  Results     Procedure Component Value Units Date/Time    CULTURE TISSUE W/ GRM STAIN [[id number]] Collected:  09/20/19 1804    Order Status:  Completed Specimen:  Tissue Updated:  09/21/19 1154     Significant Indicator NEG     Source TISS     Site Left Knee     Culture Result No growth at 24 hours.     Gram Stain Result Few WBCs.  No organisms seen.      Narrative:       Surgery Specimen    Anaerobic Culture [[id number]] Collected:  09/20/19 1804    Order Status:  Completed Specimen:  Tissue Updated:  09/21/19 1154     Significant Indicator NEG     Source TISS     Site Left Knee     Culture Result Culture in progress.    Narrative:       Surgery Specimen    Fungal Culture [[id number]] Collected:  09/20/19 1804    Order Status:  Completed Specimen:  Tissue Updated:  09/21/19 1154     Significant Indicator NEG     Source TISS     Site Left Knee     Culture Result Culture in progress.    Narrative:       Surgery Specimen    BLOOD CULTURE [[id number]] Collected:  09/16/19 1006    Order Status:  Completed Specimen:  Blood from Peripheral Updated:  09/21/19 1100     Significant Indicator NEG     Source BLD     Site PERIPHERAL     Culture Result No growth after 5 days of incubation.    Narrative:       Per Hospital Policy: Only change Specimen Src: to \"Line\" if  specified by physician order.  Right Hand    BLOOD CULTURE [[id number]] Collected:  " "09/16/19 1006    Order Status:  Completed Specimen:  Blood from Peripheral Updated:  09/21/19 1100     Significant Indicator NEG     Source BLD     Site PERIPHERAL     Culture Result No growth after 5 days of incubation.    Narrative:       Per Hospital Policy: Only change Specimen Src: to \"Line\" if  specified by physician order.  Left Hand    Anaerobic Culture [[id number]] Collected:  09/10/19 2309    Order Status:  Completed Specimen:  Wound Updated:  09/21/19 0727     Significant Indicator NEG     Source WND     Site Left Knee     Culture Result Culture in progress.    Narrative:       Surgery - swabs received    CULTURE WOUND W/ GRAM STAIN [[id number]] Collected:  09/10/19 2309    Order Status:  Completed Specimen:  Wound Updated:  09/21/19 0727     Significant Indicator NEG     Source WND     Site Left Knee     Culture Result No growth at 10 days.     Gram Stain Result Many WBCs.  Rare Gram positive cocci.      Narrative:       Surgery - swabs received    GRAM STAIN [[id number]] Collected:  09/20/19 1804    Order Status:  Completed Specimen:  Tissue Updated:  09/21/19 0255     Significant Indicator .     Source TISS     Site Left Knee     Gram Stain Result Few WBCs.  No organisms seen.      Narrative:       Surgery Specimen    AFB Culture [[id number]] Collected:  09/20/19 1804    Order Status:  Completed Specimen:  Other Updated:  09/20/19 1906    Blood Culture [[id number]]     Order Status:  Canceled Specimen:  Blood from Peripheral     Blood Culture [[id number]] Collected:  09/10/19 2225    Order Status:  Completed Specimen:  Blood from Peripheral Updated:  09/16/19 0100     Significant Indicator NEG     Source BLD     Site PERIPHERAL     Culture Result No growth after 5 days of incubation.    Narrative:       From different peripheral sites, if not done within the last  24 hours (Per Hospital Policy: Only change specimen source to  \"Line\" if specified by physician order)  Left AC    Blood Culture [[id number]] " "Collected:  09/10/19 2225    Order Status:  Completed Specimen:  Blood from Peripheral Updated:  09/16/19 0100     Significant Indicator NEG     Source BLD     Site PERIPHERAL     Culture Result No growth after 5 days of incubation.    Narrative:       From different peripheral sites, if not done within the last  24 hours (Per Hospital Policy: Only change specimen source to  \"Line\" if specified by physician order)  Left Hand          Assessment:  Active Hospital Problems    Diagnosis   • *Septic arthritis of knee, left (HCC) [M00.9]       ASSESSMENT/PLAN:      Deanna Moe is a 53 y.o.  admitted 9/10/2019. Pt has no significant past medical history.  He is in the [facility] system and states that approximately 1 month ago he \"bumped\" his knee.  Since that time he had slowly progressive edema, redness and pain.  Symptoms progressed until just prior to admit when he had severe pain edema and was unable to ambulate.  He was initially brought to an outside facility ([facility] in [place]) and per notes arthrocentesis revealed markedly elevated WBC count and initial Gram stain with GPC's.  Per notes the outside facility arthrocentesis with WBC neutral count of 112,000.  He went to the OR on 9/10 for washout of the knee.     Left knee septic arthritis  Persistent low-grade temperatures  No leukocytosis  Injury some weeks ago and progressed, no prior his of knee injury and native joint   Arthrocentesis at [facility] with WBC of 112,000 confirmed verbally and gram stain with rare GPCs, no growth on culture and final at 72 hours.  They have no remaining specimen.  S/p I&D with synovectomy on 9/10. A significant amount of purulent fluid seen in the joint and extensive  synovitis per OP note  OR cultures with GPCs and no growth (on abx prior)  Doppler US - neg for DVT  Microbiology holding cultures for 14 days  Continue IV vancomycin and ceftriaxone 2 g daily  Monitor renal function and vanco trough  Vanco trough 10.6 on " 9/21  MRI on 9/19 + large joint effusion  S/p repeat I&D on 9/20. Large hematoma found and evacuated  OR cultures - NGTD  Plan for 4 weeks of IV abx from 9/10  Stop date 10/08/19 pending clinical improvement    Ongoing fevers  Low-grade temperatures. Stable  T-max 100  Hopefully fever curve will now improve given repeat I&D and hematoma evacuated  Blood cultures 9/16 - NGTD  On abx above    I have performed a physical exam and reviewed and updated ROS and plan today 9/21/2019.  In review of yesterday's note 9/20/2019, there are no changes except as documented above.

## 2019-09-21 NOTE — ANESTHESIA TIME REPORT
Anesthesia Start and Stop Event Times     Date Time Event    9/20/2019 1550 Ready for Procedure     1741 Anesthesia Start     1814 Anesthesia Stop        Responsible Staff  09/20/19    Name Role Begin End    Iker Montes M.D. Anesth 1741 1814        Preop Diagnosis (Free Text):  Pre-op Diagnosis     infected left knee        Preop Diagnosis (Codes):    Post op Diagnosis  Septic arthritis of knee, left (HCC)      Premium Reason  A. 3PM - 7AM    Comments:

## 2019-09-21 NOTE — PROGRESS NOTES
POD#1  S/P I&D knee  Awaiting operative cultures  WBAT  PT/OT for ROM  Keep HV until minimal output  ABX per ID

## 2019-09-21 NOTE — OR NURSING
Vss. Pt medicated for pain and nausea. Now tolerating po fluids. Hemovac to L knee draining small amounts of blood. CSM intact to LLE. Will continue to monitor.

## 2019-09-21 NOTE — ANESTHESIA PROCEDURE NOTES
Airway  Date/Time: 9/20/2019 5:47 PM  Performed by: Iker Montes M.D.  Authorized by: Iker Montes M.D.     Location:  OR  Urgency:  Elective  Indications for Airway Management:  Anesthesia  Spontaneous Ventilation: absent    Sedation Level:  Deep  Preoxygenated: Yes    Patient Position:  Sniffing  Final Airway Type:  Supraglottic airway  Final Supraglottic Airway:  Standard LMA  SGA Size:  4  Number of Attempts at Approach:  1

## 2019-09-21 NOTE — PROGRESS NOTES
[facility] Medicine Daily Progress Note    Date of Service  9/21/2019    Chief Complaint  Left knee pain and swelling, transferred from [facility]    Hospital Course   The patient is a 53-year-old male prisoner who was transferred from [facility] on 9/10/2019 after being found to have a septic left knee joint.  [facility]  However, it did note a 7 x 2 cm fluid collection in the medial soft tissues of the knee.  While there, arthrocentesis was performed and synovial fluid analysis showed 112,000 WBCs with 90% neutrophils. Gram stain preliminarily showed gram-positive cocci.  He was subsequently placed on IV zosyn and vancomycin, and orthopedic surgery was consulted.  On 9/10/2019 the patient underwent irrigation debridement of the left knee, and intraoperative cultures done at that time were negative.  Despite negative culture results and washout, he continued to spike fevers and was not clinically improving.  ID was subsequently consulted for antibiotic management. On 9/19/2019 he continued to have pain and swelling so an MRI was done and showed a large joint effusion in addition to marrow edema involving the tibia and fibula centrally in the areas of the origin/attachment of the ACL. Orthopedic surgery was recontacted for possible additional surgical intervention and on 9/20/2019 the patient underwent repeat irrigation and debridement of the left knee with hemovac placement.      Interval Problem Update  States he is still having a significant amount of pain, though the pain medication allows him to sleep for a few hours at a time.  Per his report his swelling is unchanged from prior.  On exam there is no drainage present on the surgical dressing.    CBC mostly stable from yesterday, platelet count 550 -> 639 overnight.  BMP showed minimal improvement in his sodium level, 132 -> 133.  Renal function and electrolyte levels are  normal.  Intraoperative cultures are still preliminarily negative.    T-max 100.1 °F, HR 60s-110, SBP 120s-150s, O2 saturations 92-96% on 1 L/min of oxygen via nasal cannula.    WBAT LLE.    Currently on IV ceftriaxone and vancomycin.  Infectious disease planning on 4 weeks of IV antibiotics from 9/10/2019, anticipated stop date is 10/8/2019.    Consultants/Specialty  Orthopedic surgery  Infectious disease    Code Status  Full code    Disposition  Clinical for now.  Will address possible placement with social work when they return on Monday.    Review of Systems  Review of Systems   Constitutional: Negative for chills, diaphoresis, fever and malaise/fatigue.   HENT: Negative for congestion.    Respiratory: Negative for cough and shortness of breath.    Cardiovascular: Positive for leg swelling (LLE). Negative for chest pain and palpitations.   Gastrointestinal: Negative for abdominal pain, constipation, diarrhea, nausea and vomiting.   Genitourinary: Negative for dysuria, frequency and urgency.   Musculoskeletal: Positive for joint pain (Left knee). Negative for myalgias.   Skin: Negative for itching and rash.   Neurological: Positive for tingling (Mild, LLE, related to swelling) and focal weakness (LLE secondary to pain). Negative for dizziness, sensory change, speech change, weakness and headaches.   Psychiatric/Behavioral: Negative for depression. The patient is not nervous/anxious and does not have insomnia.    All other systems reviewed and are negative.     Physical Exam  Temp:  [36.4 °C (97.5 °F)-37.8 °C (100.1 °F)] 36.4 °C (97.5 °F)  Pulse:  [] 88  Resp:  [15-22] 16  BP: (107-155)/(69-84) 107/71  SpO2:  [91 %-99 %] 94 %    Physical Exam   Constitutional: He is oriented to person, place, and time. He appears well-developed and well-nourished. He is active and cooperative. He does not appear ill. No distress.   Doing well on room air at time of exam.   HENT:   Head: Normocephalic and atraumatic.   Eyes:  Pupils are equal, round, and reactive to light. Conjunctivae are normal. Right eye exhibits no discharge. Left eye exhibits no discharge. No scleral icterus.   Neck: Normal range of motion and phonation normal. Neck supple. No JVD present.   Cardiovascular: Normal rate, regular rhythm, normal heart sounds and intact distal pulses. Exam reveals no gallop and no friction rub.   No murmur heard.  Pulmonary/Chest: Effort normal and breath sounds normal. No accessory muscle usage or stridor. No respiratory distress. He has no decreased breath sounds. He has no wheezes. He has no rhonchi. He has no rales.   Abdominal: Soft. He exhibits no distension and no abdominal bruit. Bowel sounds are increased. There is no tenderness. There is no rigidity and no guarding.   Musculoskeletal: He exhibits edema (LLE) and tenderness (Left knee).        Left knee: He exhibits decreased range of motion and swelling.   Neurological: He is alert and oriented to person, place, and time. No cranial nerve deficit or sensory deficit. GCS eye subscore is 4. GCS verbal subscore is 5. GCS motor subscore is 6.   LLE strength is limited by pain.  Reports normal sensation outside of mild tingling secondary to swelling.   Skin: Skin is warm and dry. No rash noted. He is not diaphoretic. No pallor.        Psychiatric: He has a normal mood and affect. His speech is normal and behavior is normal. Judgment and thought content normal. Cognition and memory are normal.   Nursing note and vitals reviewed.    Fluids    Intake/Output Summary (Last 24 hours) at 9/21/2019 1352  Last data filed at 9/21/2019 1254  Gross per 24 hour   Intake 300 ml   Output 1220 ml   Net -920 ml     Laboratory  Recent Labs     09/19/19  0115 09/20/19  0320 09/21/19  0904   WBC 11.7* 8.6 8.9   RBC 4.21* 4.03* 4.07*   HEMOGLOBIN 12.7* 11.9* 12.1*   HEMATOCRIT 37.9* 36.4* 36.1*   MCV 90.0 90.3 88.7   MCH 30.2 29.5 29.7   MCHC 33.5* 32.7* 33.5*   RDW 42.8 43.5 42.4   PLATELETCT 595*  550* 639*   MPV 9.5 9.2 9.1     Recent Labs     09/19/19  0115 09/21/19  0904   SODIUM 132* 133*   POTASSIUM 4.0 4.1   CHLORIDE 97 99   CO2 25 25   GLUCOSE 125* 110*   BUN 13 12   CREATININE 0.96 0.87   CALCIUM 8.8 8.8     Imaging  IR-MIDLINE CATHETER INSERTION WO GUIDANCE > AGE 3   Final Result                  Ultrasound-guided midline placement performed by qualified nursing staff    as above.          MR-KNEE-WITH & W/O LEFT   Final Result      1.  Very limited study due to extensive patient motion artifact.      2.  Large joint effusion with synovitis. There are also punctate low signal foci within the joint fluid suggesting areas of gas. Consideration should be given for septic arthritis.      3.  Limited evaluation of the anterior cruciate ligament. The anterior cruciate ligament is ill-defined with diffuse increased signal possibly representing a high-grade partial-thickness tear with mucoid degeneration. The amount of motion artifact    precludes the ability to completely exclude complete disruption of the anterior cruciate ligament.      4.  Marrow edema involving the distal femur and proximal tibia in the areas of the origin and attachment of the anterior cruciate ligament. This may represent reactive change related to chronic tear and mucoid degeneration. Early osteomyelitis cannot    definitely be excluded.      5.  Edema and enhancement of the soft tissues posterior and lateral to the knee consistent with cellulitis.      6.  Skin staples anterior to the patella.      US-EXTREMITY VENOUS LOWER UNILAT LEFT   Final Result      IR-MIDLINE CATHETER INSERTION WO GUIDANCE > AGE 3   Final Result                  Ultrasound-guided midline placement performed by qualified nursing staff    as above.          IR-US GUIDED PIV   Final Result    Ultrasound-guided PERIPHERAL IV INSERTION performed by    qualified nursing staff as above.            US-EXTREMITY VENOUS LOWER UNILAT LEFT   Final Result      US-FOREIGN  FILM ULTRASOUND   Final Result         Assessment/Plan  * Septic arthritis of knee, left (HCC)- (present on admission)  Assessment & Plan  -S/p I&D's 9/10/2019 and 9/20/2019.  -Continue pain control using narcotics and baclofen.  Spoke with the bedside nurse and encouraged him to also use ice packs.  -The patient states he is able to ambulate utilizing a front wheel walker. Reeducated him on the importance of elevating the extremity while at rest.  -Currently on IV ceftriaxone and vancomycin.  Infectious disease is following and guiding antibiotic treatment.  Anticipated antibiotic end date is 10/8/2019.   -Continue working with PT/OT and the nursing staff on frequent mobilization.  -Orthopedic surgery following.    Normocytic anemia- (present on admission)  Assessment & Plan  -Will do anemia work-up with tomorrow's a.m. labs.  -No current evidence of blood loss.    Thrombocytosis (HCC)  Assessment & Plan  -Likely reactive, will recheck tomorrow.    Hyponatremia  Assessment & Plan  -Mild, currently 133.  Could be secondary to pain, stress, being postoperative and/or related to opioids.  -Will check urine and serum osmolality in addition to urine sodium.  -Continue to treat the above underlying conditions and monitor response.    Acute hyperglycemia- (present on admission)  Assessment & Plan  -Mild. A1C done this admission was 6%.  -Will need continued outpatient monitoring/follow-up.    HLD (hyperlipidemia)- (present on admission)  Assessment & Plan  -Continue atorvastatin and obtain updated lipid profile tomorrow morning.     VTE prophylaxis: Lovenox    -----------------------------------------------------------------------------------------------------------------------------------------------------  Electronically signed by:  Tabatha Tolliver, MSN, RN, APRN, ACNPC-AG, CCRN  Nurse Practitioner  [facility]  9/21/2019    1:52 PM

## 2019-09-22 LAB
ANION GAP SERPL CALC-SCNC: 7 MMOL/L (ref 0–11.9)
BUN SERPL-MCNC: 13 MG/DL (ref 8–22)
CALCIUM SERPL-MCNC: 8.7 MG/DL (ref 8.5–10.5)
CHLORIDE SERPL-SCNC: 99 MMOL/L (ref 96–112)
CHOLEST SERPL-MCNC: 128 MG/DL (ref 100–199)
CO2 SERPL-SCNC: 29 MMOL/L (ref 20–33)
CREAT SERPL-MCNC: 0.95 MG/DL (ref 0.5–1.4)
ERYTHROCYTE [DISTWIDTH] IN BLOOD BY AUTOMATED COUNT: 43.6 FL (ref 35.9–50)
FERRITIN SERPL-MCNC: 359.3 NG/ML (ref 22–322)
FOLATE SERPL-MCNC: 17.5 NG/ML
GLUCOSE SERPL-MCNC: 134 MG/DL (ref 65–99)
HCT VFR BLD AUTO: 37.4 % (ref 42–52)
HDLC SERPL-MCNC: 31 MG/DL
HGB BLD-MCNC: 11.8 G/DL (ref 14–18)
HGB RETIC QN AUTO: 27.7 PG/CELL (ref 29–35)
IMM RETICS NFR: 22.2 % (ref 9.3–17.4)
IRON SATN MFR SERPL: 12 % (ref 15–55)
IRON SERPL-MCNC: 27 UG/DL (ref 50–180)
LDLC SERPL CALC-MCNC: 82 MG/DL
MCH RBC QN AUTO: 28.7 PG (ref 27–33)
MCHC RBC AUTO-ENTMCNC: 31.6 G/DL (ref 33.7–35.3)
MCV RBC AUTO: 91 FL (ref 81.4–97.8)
PLATELET # BLD AUTO: 623 K/UL (ref 164–446)
PMV BLD AUTO: 8.9 FL (ref 9–12.9)
POTASSIUM SERPL-SCNC: 3.8 MMOL/L (ref 3.6–5.5)
RBC # BLD AUTO: 4.11 M/UL (ref 4.7–6.1)
RETICS # AUTO: 0.12 M/UL (ref 0.04–0.06)
RETICS/RBC NFR: 2.8 % (ref 0.8–2.1)
SODIUM SERPL-SCNC: 135 MMOL/L (ref 135–145)
TIBC SERPL-MCNC: 228 UG/DL (ref 250–450)
TRIGL SERPL-MCNC: 75 MG/DL (ref 0–149)
TSH SERPL DL<=0.005 MIU/L-ACNC: 2.95 UIU/ML (ref 0.38–5.33)
VIT B12 SERPL-MCNC: 333 PG/ML (ref 211–911)
WBC # BLD AUTO: 7.9 K/UL (ref 4.8–10.8)

## 2019-09-22 PROCEDURE — 83540 ASSAY OF IRON: CPT

## 2019-09-22 PROCEDURE — 700102 HCHG RX REV CODE 250 W/ 637 OVERRIDE(OP): Performed by: INTERNAL MEDICINE

## 2019-09-22 PROCEDURE — 85027 COMPLETE CBC AUTOMATED: CPT

## 2019-09-22 PROCEDURE — 700111 HCHG RX REV CODE 636 W/ 250 OVERRIDE (IP): Performed by: HOSPITALIST

## 2019-09-22 PROCEDURE — 82607 VITAMIN B-12: CPT

## 2019-09-22 PROCEDURE — 80048 BASIC METABOLIC PNL TOTAL CA: CPT

## 2019-09-22 PROCEDURE — A9270 NON-COVERED ITEM OR SERVICE: HCPCS | Performed by: HOSPITALIST

## 2019-09-22 PROCEDURE — 700105 HCHG RX REV CODE 258: Performed by: INTERNAL MEDICINE

## 2019-09-22 PROCEDURE — 84443 ASSAY THYROID STIM HORMONE: CPT

## 2019-09-22 PROCEDURE — 80061 LIPID PANEL: CPT

## 2019-09-22 PROCEDURE — 99232 SBSQ HOSP IP/OBS MODERATE 35: CPT | Performed by: HOSPITALIST

## 2019-09-22 PROCEDURE — 700111 HCHG RX REV CODE 636 W/ 250 OVERRIDE (IP): Performed by: INTERNAL MEDICINE

## 2019-09-22 PROCEDURE — 82746 ASSAY OF FOLIC ACID SERUM: CPT

## 2019-09-22 PROCEDURE — 770001 HCHG ROOM/CARE - MED/SURG/GYN PRIV*

## 2019-09-22 PROCEDURE — A9270 NON-COVERED ITEM OR SERVICE: HCPCS | Performed by: NURSE PRACTITIONER

## 2019-09-22 PROCEDURE — 700102 HCHG RX REV CODE 250 W/ 637 OVERRIDE(OP): Performed by: HOSPITALIST

## 2019-09-22 PROCEDURE — 700111 HCHG RX REV CODE 636 W/ 250 OVERRIDE (IP): Performed by: NURSE PRACTITIONER

## 2019-09-22 PROCEDURE — 85046 RETICYTE/HGB CONCENTRATE: CPT

## 2019-09-22 PROCEDURE — A9270 NON-COVERED ITEM OR SERVICE: HCPCS | Performed by: INTERNAL MEDICINE

## 2019-09-22 PROCEDURE — 99232 SBSQ HOSP IP/OBS MODERATE 35: CPT | Performed by: INTERNAL MEDICINE

## 2019-09-22 PROCEDURE — 82728 ASSAY OF FERRITIN: CPT

## 2019-09-22 PROCEDURE — 700102 HCHG RX REV CODE 250 W/ 637 OVERRIDE(OP): Performed by: NURSE PRACTITIONER

## 2019-09-22 PROCEDURE — 83550 IRON BINDING TEST: CPT

## 2019-09-22 PROCEDURE — 700105 HCHG RX REV CODE 258: Performed by: HOSPITALIST

## 2019-09-22 RX ORDER — CYANOCOBALAMIN 1000 UG/ML
1000 INJECTION, SOLUTION INTRAMUSCULAR; SUBCUTANEOUS ONCE
Status: COMPLETED | OUTPATIENT
Start: 2019-09-22 | End: 2019-09-22

## 2019-09-22 RX ADMIN — ACETAMINOPHEN 650 MG: 325 TABLET, FILM COATED ORAL at 08:26

## 2019-09-22 RX ADMIN — BACLOFEN 10 MG: 10 TABLET ORAL at 19:54

## 2019-09-22 RX ADMIN — VANCOMYCIN HYDROCHLORIDE 1600 MG: 500 INJECTION, POWDER, LYOPHILIZED, FOR SOLUTION INTRAVENOUS at 06:11

## 2019-09-22 RX ADMIN — ENOXAPARIN SODIUM 40 MG: 100 INJECTION SUBCUTANEOUS at 04:22

## 2019-09-22 RX ADMIN — OXYCODONE HYDROCHLORIDE 10 MG: 10 TABLET ORAL at 08:26

## 2019-09-22 RX ADMIN — SENNOSIDES, DOCUSATE SODIUM 2 TABLET: 50; 8.6 TABLET, FILM COATED ORAL at 17:37

## 2019-09-22 RX ADMIN — ACETAMINOPHEN 650 MG: 325 TABLET, FILM COATED ORAL at 16:18

## 2019-09-22 RX ADMIN — VANCOMYCIN HYDROCHLORIDE 1600 MG: 500 INJECTION, POWDER, LYOPHILIZED, FOR SOLUTION INTRAVENOUS at 18:28

## 2019-09-22 RX ADMIN — OXYCODONE HYDROCHLORIDE 10 MG: 10 TABLET ORAL at 04:23

## 2019-09-22 RX ADMIN — BACLOFEN 10 MG: 10 TABLET ORAL at 11:32

## 2019-09-22 RX ADMIN — OXYCODONE HYDROCHLORIDE 10 MG: 10 TABLET ORAL at 19:54

## 2019-09-22 RX ADMIN — OXYCODONE HYDROCHLORIDE 10 MG: 10 TABLET ORAL at 16:18

## 2019-09-22 RX ADMIN — CYANOCOBALAMIN 1000 MCG: 1000 INJECTION, SOLUTION INTRAMUSCULAR; SUBCUTANEOUS at 11:33

## 2019-09-22 RX ADMIN — ATORVASTATIN CALCIUM 20 MG: 20 TABLET, FILM COATED ORAL at 17:37

## 2019-09-22 RX ADMIN — OXYCODONE HYDROCHLORIDE 10 MG: 10 TABLET ORAL at 00:35

## 2019-09-22 RX ADMIN — CEFTRIAXONE SODIUM 2 G: 2 INJECTION, POWDER, FOR SOLUTION INTRAMUSCULAR; INTRAVENOUS at 04:22

## 2019-09-22 RX ADMIN — OXYCODONE HYDROCHLORIDE 10 MG: 10 TABLET ORAL at 12:30

## 2019-09-22 ASSESSMENT — ENCOUNTER SYMPTOMS
FEVER: 0
DIZZINESS: 0
PALPITATIONS: 0
SPEECH CHANGE: 0
HEADACHES: 0
NERVOUS/ANXIOUS: 0
NAUSEA: 0
ABDOMINAL PAIN: 0
MYALGIAS: 0
CHILLS: 0
FOCAL WEAKNESS: 1
COUGH: 0
WEAKNESS: 0
DEPRESSION: 0
DIARRHEA: 0
DIAPHORESIS: 0
SENSORY CHANGE: 0
VOMITING: 0
CONSTIPATION: 0
SHORTNESS OF BREATH: 0
TINGLING: 1
INSOMNIA: 0

## 2019-09-22 NOTE — PROGRESS NOTES
Pharmacy Kinetics 53 y.o. male on vancomycin day # 12    2019    Currently on Vancomycin 1600 mg iv q12hr (0700 1900)  Provider specified end date: 10/08/19     Indication for Treatment: SSTI , Septic knee     Pertinent history per medical record: Admitted on 9/10/2019 for septic joint. I&D performed on 9/10 by Dr. Coon. Drain was removed on . Pt has ongoing low grade temperatures and knee pain. Repeat imaging ordered to r/o abscess or osteomyelitis.     Other antibiotics: ceftriaxone 2 g IV Q24H     Allergies: Patient has no known allergies.      List concerns for renal function: obesity (BMI 33.97 kg/m2)     Pertinent cultures to date:   09/10/19:PBCx2:NGTD  09/10/19:Lt Knee,WND:Peptostreptococcus    19:PBCx2:NGTD  19:Lt Knee,TISS:NGTD     MRSA nares swab if pneumonia is a concern (ordered/positive/negative/n-a): NA    Recent Labs     19  0320 19  0904 19  0220   WBC 8.6 8.9 7.9     Recent Labs     19  0904 19  0220   BUN 12 13   CREATININE 0.87 0.95     Recent Labs     19  0904   VANCOTROUGH 10.6       Intake/Output Summary (Last 24 hours) at 2019 0831  Last data filed at 2019 0600  Gross per 24 hour   Intake --   Output  ml   Net - ml      /62   Pulse 90   Temp 35.9 °C (96.6 °F) (Temporal)   Resp 18   Ht 1.829 m (6')   Wt 113.6 kg (250 lb 7.1 oz)   SpO2 93%  Temp (24hrs), Av.6 °C (97.9 °F), Min:35.9 °C (96.6 °F), Max:37.1 °C (98.7 °F)      A/P        1. Vancomycin dose change: No change   2. Next vancomycin level: ~ 2 days   3. Goal trough: 12-16 mcg/mL   4. Comments: No leukocytosis, afebrile over interval. Cx listed above. Renal indices stable, last vancomycin level slightly below goal and maintenance dose increased. ID following , plan for 4 weeks of IV abx from 9/10.     Jermaine Valero PharmD BCPS

## 2019-09-22 NOTE — CARE PLAN
Problem: Communication  Goal: The ability to communicate needs accurately and effectively will improve  Outcome: PROGRESSING AS EXPECTED     Problem: Venous Thromboembolism (VTW)/Deep Vein Thrombosis (DVT) Prevention:  Goal: Patient will participate in Venous Thrombosis (VTE)/Deep Vein Thrombosis (DVT)Prevention Measures  Outcome: PROGRESSING AS EXPECTED     Problem: Respiratory:  Goal: Respiratory status will improve  Outcome: PROGRESSING AS EXPECTED   Patient able to express basic needs. No s/s of DVT. Respt with normal limits.

## 2019-09-22 NOTE — PROGRESS NOTES
Orthopaedic PA Progress Note    Interval changes:Anaerobic CX grew Peptostreptococcus micros. Pain with michaela/ext, recommend ID consult and gentle CPM if PT/OT agrees.    ROS - Patient denies any new issues. No chest pain, dyspnea, or fever.  Pain well controlled.    /62   Pulse 86   Temp 35.9 °C (96.6 °F) (Temporal)   Resp 18   Ht 1.829 m (6')   Wt 113.6 kg (250 lb 7.1 oz)   SpO2 93%     Patient seen and examined  No acute distress  Breathing non labored  RRR  Surgical dressing is clean, dry, and intact. Patient clearly fires tibialis anterior, EHL, and gastrocnemius/soleus. Sensation is intact to light touch throughout superficial peroneal, deep peroneal, tibial, saphenous, and sural nerve distributions. Strong and palpable 2+ dorsalis pedis and posterior tibial pulses with capillary refill less than 2 seconds. No lower leg tenderness or discomfort.  HV drain 60 mL at 0600 today  Recent Labs     09/20/19  0320 09/21/19  0904 09/22/19  0220   WBC 8.6 8.9 7.9   RBC 4.03* 4.07* 4.11*   HEMOGLOBIN 11.9* 12.1* 11.8*   HEMATOCRIT 36.4* 36.1* 37.4*   MCV 90.3 88.7 91.0   MCH 29.5 29.7 28.7   MCHC 32.7* 33.5* 31.6*   RDW 43.5 42.4 43.6   PLATELETCT 550* 639* 623*   MPV 9.2 9.1 8.9*       Active Hospital Problems    Diagnosis   • Septic arthritis of knee, left (HCC) [M00.9]     Priority: High   • Hyponatremia [E87.1]     Priority: Low   • Thrombocytosis (HCC) [D47.3]     Priority: Low   • Normocytic anemia [D64.9]     Priority: Low   • Acute hyperglycemia [R73.9]     Priority: Low   • HLD (hyperlipidemia) [E78.5]     Priority: Low       Assessment/Plan:  POD#2/12 S/P Serial I+Ds L knee  Wt bearing status - AT with AROM and PROM  PT/OT-initiated  Wound care:dressing left in place.  Drains - HV to spring suction  Ramos-no  Sutures/Staples out- 10-14 days post operatively  Antibiotics: no  DVT Prophylaxis- TEDS/SCDs/Foot pumps.   Lovenox: Start, Duration-until ambulatory > 150'  Future Procedures - not  anticipated, will remove HV at bediside when drainage <+ 30 mL daily  Case Coordination for Discharge Planning - Disposition pending above

## 2019-09-22 NOTE — CARE PLAN
Problem: Communication  Goal: The ability to communicate needs accurately and effectively will improve  Outcome: PROGRESSING AS EXPECTED     Problem: Safety  Goal: Will remain free from injury  Outcome: PROGRESSING AS EXPECTED     Problem: Venous Thromboembolism (VTW)/Deep Vein Thrombosis (DVT) Prevention:  Goal: Patient will participate in Venous Thrombosis (VTE)/Deep Vein Thrombosis (DVT)Prevention Measures  Outcome: PROGRESSING AS EXPECTED   Patient able to express basic needs. No falls while here. No s/s of DVT at this time.

## 2019-09-22 NOTE — PROGRESS NOTES
Infectious Disease Progress Note    Author: Yumiko Da Silva M.D. Date & Time of service: 2019  11:01 AM    Chief Complaint:  FU Left septic knee  arthritis    Interval History:   Tmax 100.8 WBC 8.9 Ongoing left knee pain. Unable to stand or ambulate. Tolerating IV abx without issues.   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 Ongoing left knee pain 8/10 in severity with pain meds.    T-max 99.8 WBC 8.3 patient is doing well without any new issues to report.  PICC line placed today   T-max 99.9 WBC 11.7 ongoing left knee pain associated with swelling.  Denies any cough, sore throat, dysuria or diarrhea   afebrile WBC 8.6 patient's midline was dislodged overnight.  No changes in pain or swelling.  MRI reveals significant knee effusion   Tmax 100 WBC 8.9 pt is s/p repeat I&D yesterday. Ongoing left knee pain and swelling. Hungry and wants to eat   afebrile WBC 7.9 complaining of left lower extremity and foot swelling.  Operative cultures from 9/10+ Peptostreptococcus micros    Review of Systems:  Review of Systems   Constitutional: Negative for chills, fever and malaise/fatigue.   Respiratory: Negative for cough and shortness of breath.    Cardiovascular: Positive for leg swelling.   Gastrointestinal: Negative for abdominal pain, constipation, diarrhea, nausea and vomiting.   Musculoskeletal: Positive for joint pain.        Left knee   Neurological: Negative for dizziness and headaches.   All other systems reviewed and are negative.      Hemodynamics:  Temp (24hrs), Av.5 °C (97.7 °F), Min:35.9 °C (96.6 °F), Max:37.1 °C (98.7 °F)  Temperature: 35.9 °C (96.6 °F)  Pulse  Av.1  Min: 63  Max: 110   Blood Pressure: 112/62       Physical Exam:  Physical Exam   Constitutional: He is oriented to person, place, and time. He appears well-developed and well-nourished.   HENT:   Head: Normocephalic and atraumatic.   Fair dentition   Eyes:  Pupils are equal, round, and reactive to light. Conjunctivae and EOM are normal.   Cardiovascular: Normal rate, regular rhythm and normal heart sounds.   Pulmonary/Chest: Effort normal and breath sounds normal.   Abdominal: Soft. Bowel sounds are normal. He exhibits no distension. There is tenderness. There is no rebound and no guarding.   Musculoskeletal: He exhibits edema and tenderness.   Left knee dressed with hemovac in place. +LLE swelling    LUE midline-nontender, no surrounding erythema   Neurological: He is alert and oriented to person, place, and time.   Skin: Skin is warm and dry.   Multiple tattoos   Psychiatric: He has a normal mood and affect. His behavior is normal.       Meds:    Current Facility-Administered Medications:   •  vancomycin  •  cefTRIAXone (ROCEPHIN) IV  •  MD Alert...Vancomycin per Pharmacy  •  baclofen  •  enoxaparin  •  acetaminophen  •  HYDROmorphone  •  atorvastatin  •  senna-docusate **AND** polyethylene glycol/lytes **AND** magnesium hydroxide **AND** bisacodyl  •  Notify provider if pain remains uncontrolled **AND** Use the numeric rating scale (NRS-11) on regular floors and Critical-Care Pain Observation Tool (CPOT) on ICUs/Trauma to assess pain **AND** Pulse Ox (Oximetry) **AND** Pharmacy Consult Request **AND** If patient difficult to arouse and/or has respiratory depression, stop any opiates that are currently infusing and call a Rapid Response. **AND** oxyCODONE immediate-release **AND** oxyCODONE immediate-release **AND** morphine injection  •  ondansetron  •  ondansetron  •  promethazine  •  promethazine  •  prochlorperazine    Labs:  Recent Labs     09/20/19  0320 09/21/19  0904 09/22/19  0220   WBC 8.6 8.9 7.9   RBC 4.03* 4.07* 4.11*   HEMOGLOBIN 11.9* 12.1* 11.8*   HEMATOCRIT 36.4* 36.1* 37.4*   MCV 90.3 88.7 91.0   MCH 29.5 29.7 28.7   RDW 43.5 42.4 43.6   PLATELETCT 550* 639* 623*   MPV 9.2 9.1 8.9*     Recent Labs     09/21/19  0904 09/22/19  0220   SODIUM 133* 135    POTASSIUM 4.1 3.8   CHLORIDE 99 99   CO2 25 29   GLUCOSE 110* 134*   BUN 12 13     Recent Labs     19  0904 19  0220   CREATININE 0.87 0.95       Imaging:  Ir-us Guided Piv    Result Date: 9/15/2019  EXAMINATION:                                                                    HISTORY/REASON FOR EXAM:  Ultrasound Guided PIV.  TECHNIQUE/EXAM DESCRIPTION AND NUMBER OF VIEWS:  Peripheral IV insertion with ultrasound guidance.  The procedure was prepared using maximal sterile barrier technique including sterile gown, mask, cap, and donning of sterile gloves following appropriate hand hygiene and/or sterile scrub. Patient skin site was prepped with 2% Chlorhexidine solution.   FINDINGS: Peripheral IV insertion with Ultrasound Guidance was performed by qualified imaging nursing staff without the assistance of a Radiologist.      Ultrasound-guided PERIPHERAL IV INSERTION performed by qualified nursing staff as above.     Us-extremity Venous Lower Unilat Left    Result Date: 9/15/2019   Vascular Laboratory  CONCLUSIONS  No prior study is available for comparison.  Normal left lower extremity superficial and deep venous examination.  [facility]  Exam Date:     09/15/2019 08:40  Room #:     Inpatient  Priority:     Routine  Ht (in):             Wt (lb):  Ordering Physician:        MARIELOS OLIVARES  Referring Physician:       [id number]  Sonographer:               Lucila Wilson RVT, RDCS  Study Type:                Complete Unilateral  Technical Quality:         Good  Age:    53    Gender:     M  MRN:    [id number]  :    1965      BSA:  Indications:     Pain in Limb, Edema  CPT Codes:       77757  ICD Codes:       729.5  782.3  History:         Septic left knee with pain and swelling  Limitations:  PROCEDURES:  Left lower extremity venous duplex imaging.  The following venous structures were evaluated: common femoral, profunda  femoral, greater saphenous, femoral,  popliteal , peroneal and posterior  tibial veins.  Serial compression, augmentation maneuvers,  color and spectral Doppler  flow evaluations were performed.  FINDINGS:  Left lower extremity -  Complete color filling and compressibility with normal venous flow dynamics  including spontaneous flow, response to augmentation maneuvers, and  respiratory phasicity.  No superficial or deep venous thrombosis.  Flow was evaluated in the contralateral common femoral vein and normal  venous flow dynamics including spontaneous flow, respiratory phasic  variation and augmentation were demonstrated.  Kwan Atkins MD  (Electronically Signed)  Final Date:      15 September 2019                   09:53      Micro:  Results     Procedure Component Value Units Date/Time    CULTURE TISSUE W/ GRM STAIN [[id number]] Collected:  09/20/19 1804    Order Status:  Completed Specimen:  Tissue Updated:  09/22/19 1026     Significant Indicator NEG     Source TISS     Site Left Knee     Culture Result No growth at 48 hours.     Gram Stain Result Few WBCs.  No organisms seen.      Narrative:       Surgery Specimen    Anaerobic Culture [[id number]] Collected:  09/20/19 1804    Order Status:  Completed Specimen:  Tissue Updated:  09/22/19 1026     Significant Indicator NEG     Source TISS     Site Left Knee     Culture Result Culture in progress.    Narrative:       Surgery Specimen    Fungal Culture [[id number]] Collected:  09/20/19 1804    Order Status:  Completed Specimen:  Tissue Updated:  09/22/19 1026     Significant Indicator NEG     Source TISS     Site Left Knee     Culture Result Culture in progress.    Narrative:       Surgery Specimen    AFB Culture [[id number]] Collected:  09/20/19 1804    Order Status:  Completed Specimen:  Tissue Updated:  09/22/19 1026     Significant Indicator NEG     Source TISS     Site Left Knee     Culture Result Culture in progress.     AFB Smear Results No acid fast bacilli seen.    Narrative:        "Surgery Specimen    Anaerobic Culture [[id number]]  (Abnormal) Collected:  09/10/19 2309    Order Status:  Completed Specimen:  Wound Updated:  09/22/19 0644     Significant Indicator POS     Source WND     Site Left Knee     Culture Result Growth noted after further incubation, see below for  organism identification.        Peptostreptococcus micros  Rare growth      Narrative:       Surgery - swabs received    CULTURE WOUND W/ GRAM STAIN [[id number]] Collected:  09/10/19 2309    Order Status:  Completed Specimen:  Wound Updated:  09/22/19 0643     Significant Indicator NEG     Source WND     Site Left Knee     Culture Result No growth at 11 days.     Gram Stain Result Many WBCs.  Rare Gram positive cocci.      Narrative:       Surgery - swabs received    GRAM STAIN [[id number]] Collected:  09/20/19 1804    Order Status:  Completed Specimen:  Tissue Updated:  09/21/19 2300     Significant Indicator .     Source TISS     Site Left Knee     Gram Stain Result Few WBCs.  No organisms seen.      Narrative:       Surgery Specimen    Acid Fast Stain [[id number]] Collected:  09/20/19 1804    Order Status:  Completed Specimen:  Tissue Updated:  09/21/19 2300     Significant Indicator NEG     Source TISS     Site Left Knee     AFB Smear Results No acid fast bacilli seen.    Narrative:       Surgery Specimen    BLOOD CULTURE [[id number]] Collected:  09/16/19 1006    Order Status:  Completed Specimen:  Blood from Peripheral Updated:  09/21/19 1100     Significant Indicator NEG     Source BLD     Site PERIPHERAL     Culture Result No growth after 5 days of incubation.    Narrative:       Per Hospital Policy: Only change Specimen Src: to \"Line\" if  specified by physician order.  Right Hand    BLOOD CULTURE [[id number]] Collected:  09/16/19 1006    Order Status:  Completed Specimen:  Blood from Peripheral Updated:  09/21/19 1100     Significant Indicator NEG     Source BLD     Site PERIPHERAL     Culture Result No growth after 5 days " "of incubation.    Narrative:       Per Hospital Policy: Only change Specimen Src: to \"Line\" if  specified by physician order.  Left Hand    Blood Culture [[id number]]     Order Status:  Canceled Specimen:  Blood from Peripheral     Blood Culture [[id number]] Collected:  09/10/19 2225    Order Status:  Completed Specimen:  Blood from Peripheral Updated:  09/16/19 0100     Significant Indicator NEG     Source BLD     Site PERIPHERAL     Culture Result No growth after 5 days of incubation.    Narrative:       From different peripheral sites, if not done within the last  24 hours (Per Hospital Policy: Only change specimen source to  \"Line\" if specified by physician order)  Left AC    Blood Culture [[id number]] Collected:  09/10/19 2225    Order Status:  Completed Specimen:  Blood from Peripheral Updated:  09/16/19 0100     Significant Indicator NEG     Source BLD     Site PERIPHERAL     Culture Result No growth after 5 days of incubation.    Narrative:       From different peripheral sites, if not done within the last  24 hours (Per Hospital Policy: Only change specimen source to  \"Line\" if specified by physician order)  Left Hand          Assessment:  Active Hospital Problems    Diagnosis   • *Septic arthritis of knee, left (HCC) [M00.9]       ASSESSMENT/PLAN:      Deanna Moe is a 53 y.o.  admitted 9/10/2019. Pt has no significant past medical history.  He is in the care home system and states that approximately 1 month ago he \"bumped\" his knee.  Since that time he had slowly progressive edema, redness and pain.  Symptoms progressed until just prior to admit when he had severe pain edema and was unable to ambulate.  He was initially brought to an outside facility ([facility] in [place]) and per notes arthrocentesis revealed markedly elevated WBC count and initial Gram stain with GPC's.  Per notes the outside facility arthrocentesis with WBC neutral count of 112,000.  He went to the OR on 9/10 for washout of the " knee.     Left knee septic arthritis  Persistent low-grade temperatures  No leukocytosis  Injury some weeks ago and progressed, no prior his of knee injury and native joint   Arthrocentesis at [facility] with WBC of 112,000 confirmed verbally and gram stain with rare GPCs, no growth on culture and final at 72 hours.  They have no remaining specimen.  S/p I&D with synovectomy on 9/10. A significant amount of purulent fluid seen in the joint and extensive  synovitis per OP note  OR Gram stain + GPCs.  Anaerobic culture - rare growth Peptostreptococcus micros  Doppler US - neg for DVT  Microbiology holding cultures for 14 days  Continue IV vancomycin and ceftriaxone 2 g daily  Monitor renal function and vanco trough  Vanco trough 10.6 on 9/21  MRI on 9/19 + large joint effusion  S/p repeat I&D on 9/20. Large hematoma found and evacuated  OR cultures from 9/19- NGTD  Plan for 4 weeks of IV abx from 9/10  Stop date 10/08/19 pending clinical improvement    Ongoing fevers.  Improved overall  No fevers overnight  Low-grade fevers now improving given repeat I&D and hematoma evacuated on 9/20  Blood cultures 9/16 - NGTD  On abx above    I have performed a physical exam and reviewed and updated ROS and plan today 9/22/2019.  In review of yesterday's note 9/21/2019, there are no changes except as documented above.    Plan of care discussed with Dr. Cunningham and judith Chairez

## 2019-09-22 NOTE — PROGRESS NOTES
Hospital Medicine Daily Progress Note    Date of Service  9/22/2019    Chief Complaint  Left knee pain and swelling, transferred from [facility]    Hospital Course   The patient is a 53-year-old male prisoner who was transferred from [facility] on 9/10/2019 after being found to have a septic left knee joint.  [facility].  However, it did note a 7 x 2 cm fluid collection in the medial soft tissues of the knee.  While there, arthrocentesis was performed and synovial fluid analysis showed 112,000 WBCs with 90% neutrophils. Gram stain preliminarily showed gram-positive cocci.  He was subsequently placed on IV zosyn and vancomycin, and orthopedic surgery was consulted.  On 9/10/2019 the patient underwent irrigation debridement of the left knee, and intraoperative cultures done at that time were negative.  Despite negative culture results and washout, he continued to spike fevers and was not clinically improving.  ID was subsequently consulted for antibiotic management. On 9/19/2019 he continued to have pain and swelling so an MRI was done and showed a large joint effusion in addition to marrow edema involving the tibia and fibula centrally in the areas of the origin/attachment of the ACL. Orthopedic surgery was recontacted for possible additional surgical intervention and on 9/20/2019 the patient underwent repeat irrigation and debridement of the left knee with hemovac placement.      Interval Problem Update  Pain controlled on oxycodone. Appears comfortable. DC'd PO dilaudid. Has no other complaints. Keeping his LLE elevated. Encouraged OOB to chair for every meal & ambulating around the room frequently.     CBC stable from yesterday.  BMP showed resolution of his mild hyponatremia.  TSH within normal limits.  Ferritin level increased.  Folic acid and vitamin B12 levels are normal, though his B12 level is on the lower side of  normal.    Afebrile overnight, HR 90s, SBP teens-140s, O2 saturations 94-97% on room air.    WBAT LLE.    Cultures negative so far.    Currently on IV ceftriaxone and vancomycin.  Infectious disease planning on 4 weeks of IV antibiotics from 9/10/2019, anticipated stop date is 10/8/2019.    Consultants/Specialty  Orthopedic surgery  Infectious disease    Code Status  Full code    Disposition  Clinical for now.  Will address possible placement with social work when they return tomorrow.    Review of Systems  Review of Systems   Constitutional: Negative for chills, diaphoresis, fever and malaise/fatigue.   Respiratory: Negative for cough and shortness of breath.    Cardiovascular: Positive for leg swelling (LLE, stable from yesterday). Negative for chest pain and palpitations.   Gastrointestinal: Negative for abdominal pain, constipation, diarrhea, nausea and vomiting.   Genitourinary: Negative for dysuria, frequency and urgency.   Musculoskeletal: Positive for joint pain (Left knee, currently controlled). Negative for myalgias.   Neurological: Positive for tingling (Mild, LLE, related to swelling, minimal improvement since yesterday) and focal weakness (LLE secondary to pain). Negative for dizziness, sensory change, speech change, weakness and headaches.   Psychiatric/Behavioral: Negative for depression. The patient is not nervous/anxious and does not have insomnia.    All other systems reviewed and are negative.     Physical Exam  Temp:  [35.9 °C (96.6 °F)-37.1 °C (98.7 °F)] 35.9 °C (96.6 °F)  Pulse:  [86-98] 86  Resp:  [16-18] 18  BP: (112-147)/(62-80) 112/62  SpO2:  [93 %-98 %] 93 %    Physical Exam   Constitutional: He is oriented to person, place, and time. He appears well-developed and well-nourished. He is active and cooperative. He does not appear ill. No distress.   Doing well on room air at time of exam.  Appears comfortable.    HENT:   Head: Normocephalic and atraumatic.   Eyes: Pupils are equal, round,  and reactive to light. Conjunctivae are normal. Right eye exhibits no discharge. Left eye exhibits no discharge. No scleral icterus.   Neck: Normal range of motion and phonation normal. Neck supple.   Cardiovascular: Normal rate, regular rhythm, normal heart sounds and intact distal pulses. Exam reveals no gallop and no friction rub.   No murmur heard.  Pulmonary/Chest: Effort normal and breath sounds normal. No accessory muscle usage or stridor. No respiratory distress. He has no decreased breath sounds. He has no wheezes. He has no rhonchi. He has no rales.   Abdominal: Soft. Bowel sounds are normal. He exhibits no distension and no abdominal bruit. There is no tenderness. There is no rigidity and no guarding.   Musculoskeletal: He exhibits edema (LLE, unchanged from yesterday).        Left knee: He exhibits decreased range of motion and swelling.   Neurological: He is alert and oriented to person, place, and time. No cranial nerve deficit or sensory deficit. GCS eye subscore is 4. GCS verbal subscore is 5. GCS motor subscore is 6.   LLE strength is limited by pain.  Reports normal sensation outside of mild tingling secondary to swelling.   Skin: Skin is warm and dry. No rash noted. He is not diaphoretic. No pallor.        Psychiatric: He has a normal mood and affect. His speech is normal and behavior is normal. Judgment and thought content normal. Cognition and memory are normal.   Nursing note and vitals reviewed.    Fluids    Intake/Output Summary (Last 24 hours) at 9/22/2019 1447  Last data filed at 9/22/2019 1400  Gross per 24 hour   Intake no documentation   Output 2310 ml   Net -2310 ml     Laboratory  Recent Labs     09/20/19  0320 09/21/19  0904 09/22/19  0220   WBC 8.6 8.9 7.9   RBC 4.03* 4.07* 4.11*   HEMOGLOBIN 11.9* 12.1* 11.8*   HEMATOCRIT 36.4* 36.1* 37.4*   MCV 90.3 88.7 91.0   MCH 29.5 29.7 28.7   MCHC 32.7* 33.5* 31.6*   RDW 43.5 42.4 43.6   PLATELETCT 550* 639* 623*   MPV 9.2 9.1 8.9*     Recent  Labs     09/21/19  0904 09/22/19  0220   SODIUM 133* 135   POTASSIUM 4.1 3.8   CHLORIDE 99 99   CO2 25 29   GLUCOSE 110* 134*   BUN 12 13   CREATININE 0.87 0.95   CALCIUM 8.8 8.7     Imaging  IR-MIDLINE CATHETER INSERTION WO GUIDANCE > AGE 3   Final Result                  Ultrasound-guided midline placement performed by qualified nursing staff    as above.          MR-KNEE-WITH & W/O LEFT   Final Result      1.  Very limited study due to extensive patient motion artifact.      2.  Large joint effusion with synovitis. There are also punctate low signal foci within the joint fluid suggesting areas of gas. Consideration should be given for septic arthritis.      3.  Limited evaluation of the anterior cruciate ligament. The anterior cruciate ligament is ill-defined with diffuse increased signal possibly representing a high-grade partial-thickness tear with mucoid degeneration. The amount of motion artifact    precludes the ability to completely exclude complete disruption of the anterior cruciate ligament.      4.  Marrow edema involving the distal femur and proximal tibia in the areas of the origin and attachment of the anterior cruciate ligament. This may represent reactive change related to chronic tear and mucoid degeneration. Early osteomyelitis cannot    definitely be excluded.      5.  Edema and enhancement of the soft tissues posterior and lateral to the knee consistent with cellulitis.      6.  Skin staples anterior to the patella.      US-EXTREMITY VENOUS LOWER UNILAT LEFT   Final Result      IR-MIDLINE CATHETER INSERTION WO GUIDANCE > AGE 3   Final Result                  Ultrasound-guided midline placement performed by qualified nursing staff    as above.          IR-US GUIDED PIV   Final Result    Ultrasound-guided PERIPHERAL IV INSERTION performed by    qualified nursing staff as above.            US-EXTREMITY VENOUS LOWER UNILAT LEFT   Final Result      US-FOREIGN FILM ULTRASOUND   Final Result          Assessment/Plan  * Septic arthritis of knee, left (HCC)- (present on admission)  Assessment & Plan  -S/p I&D's 9/10/2019 and 9/20/2019.  -Continue pain control using narcotics, baclofen, & nonpharmacologic methods of pain control (e.g. Ice packs).  -The patient states he is able to ambulate utilizing a front wheel walker. Reaffirmed by corrections officers.   -Currently on IV ceftriaxone and vancomycin.  Infectious disease is following and guiding antibiotic treatment.  Anticipated antibiotic end date is 10/8/2019.   -Continue working with PT/OT and the nursing staff on frequent mobilization.  -Orthopedic surgery following.    Normocytic anemia- (present on admission)  Assessment & Plan  -Anemia work-up largely unremarkable, though he does have a B12 level that is on the low end of normal.  Will replete IM x1.  Recommend rechecking level outpatient in approximately 1-2 months.  -No evidence of blood loss at present.    Thrombocytosis (HCC)  Assessment & Plan  -Trending down slowly.    -Likely reactive, will check as needed.    Hyponatremia  Assessment & Plan  -135 this morning.  Could be secondary to pain, stress, being postoperative, and/or related to opioids.  -Serum osmolality normal yesterday, though unable to assess urine osmolality and sodium because urine was never sent.  -Continue to treat the above underlying conditions and monitor response.  -Recheck level tomorrow.    Acute hyperglycemia- (present on admission)  Assessment & Plan  -Mild. A1C done this admission was 6%.  -Will need continued outpatient monitoring/follow-up.    HLD (hyperlipidemia)- (present on admission)  Assessment & Plan  -Profile done this morning showed mildly low HDL but was otherwise normal.  We will continue home atorvastatin.   -Follow-up outpatient.     VTE prophylaxis:  Lovenox    -----------------------------------------------------------------------------------------------------------------------------------------------------  Electronically signed by:  Tabatha Tolliver, MSN, RN, APRN, ACN-, CCRN  Nurse Practitioner  [facility]  9/22/2019    2:47 PM

## 2019-09-22 NOTE — CARE PLAN
Problem: Communication  Goal: The ability to communicate needs accurately and effectively will improve  Outcome: PROGRESSING AS EXPECTED  Pt communicates needs appropriately.     Problem: Infection  Goal: Will remain free from infection  Outcome: PROGRESSING AS EXPECTED  Pt is on antibiotic regimen.     Problem: Pain Management  Goal: Pain level will decrease to patient's comfort goal  Outcome: PROGRESSING AS EXPECTED

## 2019-09-23 PROBLEM — D75.839 THROMBOCYTOSIS: Status: RESOLVED | Noted: 2019-09-21 | Resolved: 2019-09-23

## 2019-09-23 PROBLEM — E87.1 HYPONATREMIA: Status: RESOLVED | Noted: 2019-09-21 | Resolved: 2019-09-23

## 2019-09-23 LAB
ANION GAP SERPL CALC-SCNC: 9 MMOL/L (ref 0–11.9)
BACTERIA TISS AEROBE CULT: NORMAL
BUN SERPL-MCNC: 16 MG/DL (ref 8–22)
CALCIUM SERPL-MCNC: 8.9 MG/DL (ref 8.5–10.5)
CHLORIDE SERPL-SCNC: 102 MMOL/L (ref 96–112)
CO2 SERPL-SCNC: 26 MMOL/L (ref 20–33)
CREAT SERPL-MCNC: 0.81 MG/DL (ref 0.5–1.4)
ERYTHROCYTE [DISTWIDTH] IN BLOOD BY AUTOMATED COUNT: 44.3 FL (ref 35.9–50)
GLUCOSE SERPL-MCNC: 137 MG/DL (ref 65–99)
GRAM STN SPEC: NORMAL
HCT VFR BLD AUTO: 38.2 % (ref 42–52)
HGB BLD-MCNC: 12.2 G/DL (ref 14–18)
MAGNESIUM SERPL-MCNC: 2.3 MG/DL (ref 1.5–2.5)
MCH RBC QN AUTO: 29.3 PG (ref 27–33)
MCHC RBC AUTO-ENTMCNC: 31.9 G/DL (ref 33.7–35.3)
MCV RBC AUTO: 91.8 FL (ref 81.4–97.8)
PHOSPHATE SERPL-MCNC: 3.7 MG/DL (ref 2.5–4.5)
PLATELET # BLD AUTO: 652 K/UL (ref 164–446)
PMV BLD AUTO: 9 FL (ref 9–12.9)
POTASSIUM SERPL-SCNC: 4.3 MMOL/L (ref 3.6–5.5)
RBC # BLD AUTO: 4.16 M/UL (ref 4.7–6.1)
SIGNIFICANT IND 70042: NORMAL
SITE SITE: NORMAL
SODIUM SERPL-SCNC: 137 MMOL/L (ref 135–145)
SOURCE SOURCE: NORMAL
WBC # BLD AUTO: 7.3 K/UL (ref 4.8–10.8)

## 2019-09-23 PROCEDURE — 700111 HCHG RX REV CODE 636 W/ 250 OVERRIDE (IP): Performed by: HOSPITALIST

## 2019-09-23 PROCEDURE — 770001 HCHG ROOM/CARE - MED/SURG/GYN PRIV*

## 2019-09-23 PROCEDURE — 700105 HCHG RX REV CODE 258: Performed by: INTERNAL MEDICINE

## 2019-09-23 PROCEDURE — 80048 BASIC METABOLIC PNL TOTAL CA: CPT

## 2019-09-23 PROCEDURE — 700105 HCHG RX REV CODE 258: Performed by: HOSPITALIST

## 2019-09-23 PROCEDURE — 700102 HCHG RX REV CODE 250 W/ 637 OVERRIDE(OP): Performed by: HOSPITALIST

## 2019-09-23 PROCEDURE — 36415 COLL VENOUS BLD VENIPUNCTURE: CPT

## 2019-09-23 PROCEDURE — 99232 SBSQ HOSP IP/OBS MODERATE 35: CPT | Performed by: HOSPITALIST

## 2019-09-23 PROCEDURE — 83735 ASSAY OF MAGNESIUM: CPT

## 2019-09-23 PROCEDURE — A9270 NON-COVERED ITEM OR SERVICE: HCPCS | Performed by: NURSE PRACTITIONER

## 2019-09-23 PROCEDURE — 84100 ASSAY OF PHOSPHORUS: CPT

## 2019-09-23 PROCEDURE — 99232 SBSQ HOSP IP/OBS MODERATE 35: CPT | Performed by: INTERNAL MEDICINE

## 2019-09-23 PROCEDURE — 700102 HCHG RX REV CODE 250 W/ 637 OVERRIDE(OP): Performed by: NURSE PRACTITIONER

## 2019-09-23 PROCEDURE — A9270 NON-COVERED ITEM OR SERVICE: HCPCS | Performed by: HOSPITALIST

## 2019-09-23 PROCEDURE — 700111 HCHG RX REV CODE 636 W/ 250 OVERRIDE (IP): Performed by: INTERNAL MEDICINE

## 2019-09-23 PROCEDURE — 85027 COMPLETE CBC AUTOMATED: CPT

## 2019-09-23 PROCEDURE — A9270 NON-COVERED ITEM OR SERVICE: HCPCS | Performed by: INTERNAL MEDICINE

## 2019-09-23 PROCEDURE — 700102 HCHG RX REV CODE 250 W/ 637 OVERRIDE(OP): Performed by: INTERNAL MEDICINE

## 2019-09-23 RX ADMIN — OXYCODONE HYDROCHLORIDE 10 MG: 10 TABLET ORAL at 15:45

## 2019-09-23 RX ADMIN — OXYCODONE HYDROCHLORIDE 10 MG: 10 TABLET ORAL at 00:38

## 2019-09-23 RX ADMIN — SENNOSIDES, DOCUSATE SODIUM 2 TABLET: 50; 8.6 TABLET, FILM COATED ORAL at 17:49

## 2019-09-23 RX ADMIN — OXYCODONE HYDROCHLORIDE 10 MG: 10 TABLET ORAL at 19:29

## 2019-09-23 RX ADMIN — VANCOMYCIN HYDROCHLORIDE 1600 MG: 500 INJECTION, POWDER, LYOPHILIZED, FOR SOLUTION INTRAVENOUS at 08:21

## 2019-09-23 RX ADMIN — CEFTRIAXONE SODIUM 2 G: 2 INJECTION, POWDER, FOR SOLUTION INTRAMUSCULAR; INTRAVENOUS at 05:02

## 2019-09-23 RX ADMIN — OXYCODONE HYDROCHLORIDE 10 MG: 10 TABLET ORAL at 08:20

## 2019-09-23 RX ADMIN — BACLOFEN 10 MG: 10 TABLET ORAL at 05:02

## 2019-09-23 RX ADMIN — ENOXAPARIN SODIUM 40 MG: 100 INJECTION SUBCUTANEOUS at 05:02

## 2019-09-23 RX ADMIN — OXYCODONE HYDROCHLORIDE 10 MG: 10 TABLET ORAL at 05:02

## 2019-09-23 RX ADMIN — ATORVASTATIN CALCIUM 20 MG: 20 TABLET, FILM COATED ORAL at 17:48

## 2019-09-23 ASSESSMENT — ENCOUNTER SYMPTOMS
TINGLING: 1
FOCAL WEAKNESS: 1
NERVOUS/ANXIOUS: 0
INSOMNIA: 0
NAUSEA: 0
DEPRESSION: 0
FEVER: 0
HEADACHES: 0
SPEECH CHANGE: 0
VOMITING: 0
DIAPHORESIS: 0
DIARRHEA: 0
CONSTIPATION: 0
ABDOMINAL PAIN: 0
MYALGIAS: 0
PALPITATIONS: 0
SENSORY CHANGE: 0
SHORTNESS OF BREATH: 0
COUGH: 0
WEAKNESS: 0
CHILLS: 0
DIZZINESS: 0

## 2019-09-23 NOTE — PROGRESS NOTES
Infectious Disease Progress Note    Author: Lew Rizzo M.D. Date & Time of service: 2019  2:11 PM    Chief Complaint:  FU Left septic knee  arthritis    Interval History:   Tmax 100.8 WBC 8.9 Ongoing left knee pain. Unable to stand or ambulate. Tolerating IV abx without issues.   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 Ongoing left knee pain 8/10 in severity with pain meds.    T-max 99.8 WBC 8.3 patient is doing well without any new issues to report.  PICC line placed today   T-max 99.9 WBC 11.7 ongoing left knee pain associated with swelling.  Denies any cough, sore throat, dysuria or diarrhea   afebrile WBC 8.6 patient's midline was dislodged overnight.  No changes in pain or swelling.  MRI reveals significant knee effusion   Tmax 100 WBC 8.9 pt is s/p repeat I&D yesterday. Ongoing left knee pain and swelling. Hungry and wants to eat   afebrile WBC 7.9 complaining of left lower extremity and foot swelling.  Operative cultures from 9/10+ Peptostreptococcus micros   afebrile, white count 7.3, states doing well, wound VAC removed.    Review of Systems:  Review of Systems   Constitutional: Negative for chills, fever and malaise/fatigue.   Respiratory: Negative for cough and shortness of breath.    Cardiovascular: Positive for leg swelling.   Gastrointestinal: Negative for abdominal pain, constipation, diarrhea, nausea and vomiting.   Musculoskeletal: Positive for joint pain.        Left knee   Neurological: Negative for dizziness and headaches.   All other systems reviewed and are negative.  Improved.    Hemodynamics:  Temp (24hrs), Av.9 °C (98.5 °F), Min:36.5 °C (97.7 °F), Max:37.7 °C (99.8 °F)  Temperature: (Q8 per RN )  Pulse  Av.6  Min: 63  Max: 111   Blood Pressure: 121/79       Physical Exam:  Physical Exam   Constitutional: He is oriented to person, place, and time. He appears well-developed and well-nourished. No  distress.   HENT:   Head: Normocephalic and atraumatic.   Fair dentition   Eyes: Pupils are equal, round, and reactive to light. Conjunctivae and EOM are normal.   Neck: Neck supple.   Cardiovascular: Normal rate, regular rhythm and normal heart sounds.   No murmur heard.  Pulmonary/Chest: Effort normal and breath sounds normal. No respiratory distress. He has no wheezes.   Abdominal: Soft. Bowel sounds are normal. He exhibits no distension. There is tenderness. There is no rebound and no guarding.   Musculoskeletal: He exhibits edema and tenderness.   Left knee with swelling, improved per patient, midline dry dressing noted, no extension of erythema beyond dressing, wound VAC has been removed  Left upper extremity midline in place   Neurological: He is alert and oriented to person, place, and time.   Skin: Skin is warm and dry. He is not diaphoretic.   Multiple tattoos   Psychiatric: He has a normal mood and affect. His behavior is normal.   Vitals reviewed.      Meds:    Current Facility-Administered Medications:   •  cefTRIAXone (ROCEPHIN) IV  •  baclofen  •  enoxaparin  •  acetaminophen  •  atorvastatin  •  senna-docusate **AND** polyethylene glycol/lytes **AND** magnesium hydroxide **AND** bisacodyl  •  Notify provider if pain remains uncontrolled **AND** Use the numeric rating scale (NRS-11) on regular floors and Critical-Care Pain Observation Tool (CPOT) on ICUs/Trauma to assess pain **AND** Pulse Ox (Oximetry) **AND** Pharmacy Consult Request **AND** If patient difficult to arouse and/or has respiratory depression, stop any opiates that are currently infusing and call a Rapid Response. **AND** oxyCODONE immediate-release **AND** oxyCODONE immediate-release **AND** morphine injection  •  ondansetron  •  ondansetron  •  promethazine  •  promethazine  •  prochlorperazine    Labs:  Recent Labs     09/21/19  0904 09/22/19  0220 09/23/19  0325   WBC 8.9 7.9 7.3   RBC 4.07* 4.11* 4.16*   HEMOGLOBIN 12.1* 11.8*  12.2*   HEMATOCRIT 36.1* 37.4* 38.2*   MCV 88.7 91.0 91.8   MCH 29.7 28.7 29.3   RDW 42.4 43.6 44.3   PLATELETCT 639* 623* 652*   MPV 9.1 8.9* 9.0     Recent Labs     19  0904 19  0220 19  0325   SODIUM 133* 135 137   POTASSIUM 4.1 3.8 4.3   CHLORIDE 99 99 102   CO2 25 29 26   GLUCOSE 110* 134* 137*   BUN 12 13 16     Recent Labs     19  0904 19  0220 19  0325   CREATININE 0.87 0.95 0.81       Imaging:  Ir-us Guided Piv    Result Date: 9/15/2019  EXAMINATION:                                                                    HISTORY/REASON FOR EXAM:  Ultrasound Guided PIV.  TECHNIQUE/EXAM DESCRIPTION AND NUMBER OF VIEWS:  Peripheral IV insertion with ultrasound guidance.  The procedure was prepared using maximal sterile barrier technique including sterile gown, mask, cap, and donning of sterile gloves following appropriate hand hygiene and/or sterile scrub. Patient skin site was prepped with 2% Chlorhexidine solution.   FINDINGS: Peripheral IV insertion with Ultrasound Guidance was performed by qualified imaging nursing staff without the assistance of a Radiologist.      Ultrasound-guided PERIPHERAL IV INSERTION performed by qualified nursing staff as above.     Us-extremity Venous Lower Unilat Left    Result Date: 9/15/2019   Vascular Laboratory  CONCLUSIONS  No prior study is available for comparison.  Normal left lower extremity superficial and deep venous examination.  [facility]  Exam Date:     09/15/2019 08:40  Room #:     Inpatient  Priority:     Routine  Ht (in):             Wt (lb):  Ordering Physician:        MARIELOS OLIVARES  Referring Physician:       [id number]  Sonographer:               Lucila Wilson RVT, SOCO  Study Type:                Complete Unilateral  Technical Quality:         Good  Age:    53    Gender:     M  MRN:    [id number]  :    1965      BSA:  Indications:     Pain in Limb, Edema  CPT Codes:       11352   ICD Codes:       729.5  782.3  History:         Septic left knee with pain and swelling  Limitations:  PROCEDURES:  Left lower extremity venous duplex imaging.  The following venous structures were evaluated: common femoral, profunda  femoral, greater saphenous, femoral, popliteal , peroneal and posterior  tibial veins.  Serial compression, augmentation maneuvers,  color and spectral Doppler  flow evaluations were performed.  FINDINGS:  Left lower extremity -  Complete color filling and compressibility with normal venous flow dynamics  including spontaneous flow, response to augmentation maneuvers, and  respiratory phasicity.  No superficial or deep venous thrombosis.  Flow was evaluated in the contralateral common femoral vein and normal  venous flow dynamics including spontaneous flow, respiratory phasic  variation and augmentation were demonstrated.  Kwan Atkins MD  (Electronically Signed)  Final Date:      15 September 2019                   09:53      Micro:  Results     Procedure Component Value Units Date/Time    Fungal Culture [[id number]] Collected:  09/20/19 1804    Order Status:  Completed Specimen:  Tissue Updated:  09/23/19 0858     Significant Indicator NEG     Source TISS     Site Left Knee     Culture Result Culture in progress.    Narrative:       Surgery Specimen    AFB Culture [[id number]] Collected:  09/20/19 1804    Order Status:  Completed Specimen:  Tissue Updated:  09/23/19 0858     Significant Indicator NEG     Source TISS     Site Left Knee     Culture Result Culture in progress.     AFB Smear Results No acid fast bacilli seen.    Narrative:       Surgery Specimen    CULTURE TISSUE W/ GRM STAIN [[id number]] Collected:  09/20/19 1804    Order Status:  Completed Specimen:  Tissue Updated:  09/23/19 0858     Significant Indicator NEG     Source TISS     Site Left Knee     Culture Result No growth at 72 hours.     Gram Stain Result Few WBCs.  No organisms seen.      Narrative:        "Surgery Specimen    Anaerobic Culture [[id number]] Collected:  09/20/19 1804    Order Status:  Completed Specimen:  Tissue Updated:  09/23/19 0858     Significant Indicator NEG     Source TISS     Site Left Knee     Culture Result Culture in progress.    Narrative:       Surgery Specimen    CULTURE WOUND W/ GRAM STAIN [[id number]] Collected:  09/10/19 2309    Order Status:  Completed Specimen:  Wound Updated:  09/23/19 0658     Significant Indicator NEG     Source WND     Site Left Knee     Culture Result No growth at 12 days.     Gram Stain Result Many WBCs.  Rare Gram positive cocci.      Narrative:       Surgery - swabs received    Anaerobic Culture [[id number]]  (Abnormal) Collected:  09/10/19 2309    Order Status:  Completed Specimen:  Wound Updated:  09/23/19 0658     Significant Indicator POS     Source WND     Site Left Knee     Culture Result Growth noted after further incubation, see below for  organism identification.        Peptostreptococcus micros  Rare growth      Narrative:       Surgery - swabs received    GRAM STAIN [[id number]] Collected:  09/20/19 1804    Order Status:  Completed Specimen:  Tissue Updated:  09/21/19 2300     Significant Indicator .     Source TISS     Site Left Knee     Gram Stain Result Few WBCs.  No organisms seen.      Narrative:       Surgery Specimen    Acid Fast Stain [[id number]] Collected:  09/20/19 1804    Order Status:  Completed Specimen:  Tissue Updated:  09/21/19 2300     Significant Indicator NEG     Source TISS     Site Left Knee     AFB Smear Results No acid fast bacilli seen.    Narrative:       Surgery Specimen    BLOOD CULTURE [[id number]] Collected:  09/16/19 1006    Order Status:  Completed Specimen:  Blood from Peripheral Updated:  09/21/19 1100     Significant Indicator NEG     Source BLD     Site PERIPHERAL     Culture Result No growth after 5 days of incubation.    Narrative:       Per Hospital Policy: Only change Specimen Src: to \"Line\" if  specified by " "physician order.  Right Hand    BLOOD CULTURE [[id number]] Collected:  09/16/19 1006    Order Status:  Completed Specimen:  Blood from Peripheral Updated:  09/21/19 1100     Significant Indicator NEG     Source BLD     Site PERIPHERAL     Culture Result No growth after 5 days of incubation.    Narrative:       Per Hospital Policy: Only change Specimen Src: to \"Line\" if  specified by physician order.  Left Hand          Assessment:  Active Hospital Problems    Diagnosis   • *Septic arthritis of knee, left (HCC) [M00.9]       ASSESSMENT/PLAN:      Deanna Moe is a 53 y.o.  admitted 9/10/2019. Pt has no significant past medical history.  He is in the half-way system and states that approximately 1 month ago he \"bumped\" his knee.  Since that time he had slowly progressive edema, redness and pain.  Symptoms progressed until just prior to admit when he had severe pain edema and was unable to ambulate.  He was initially brought to an outside facility ([facility] in [place]) and per notes arthrocentesis revealed markedly elevated WBC count and initial Gram stain with GPC's.  Per notes the outside facility arthrocentesis with WBC neutral count of 112,000.  He went to the OR on 9/10 for washout of the knee.     Left knee septic arthritis  Persistent low-grade temperatures  No leukocytosis  Injury some weeks ago and progressed, no prior his of knee injury and native joint   Arthrocentesis at [facility] with WBC of 112,000 confirmed verbally and gram stain with rare GPCs, no growth on culture and final at 72 hours.  They have no remaining specimen.  S/p I&D with synovectomy on 9/10. A significant amount of purulent fluid seen in the joint and extensive  synovitis per OP note  OR Gram stain + GPCs.  Anaerobic culture - rare growth Peptostreptococcus micros  Doppler US - neg for DVT  Microbiology holding cultures for 14 days  MRI on 9/19 + large joint effusion  S/p repeat I&D on 9/20. Large hematoma found and evacuated  OR " cultures from 9/19- NGTD  We will stop IV vancomycin  Continue IV ceftriaxone 2 g to 24 hours  Plan for 4 weeks of IV abx from 9/10  Stop date 10/08/19 pending clinical improvement  Weekly CBC with differential, CMP while on IV antibiotics    Fevers.  Now resolved  No fevers overnight  Low-grade fevers now improving given repeat I&D and hematoma evacuated on 9/20  Blood cultures 9/16 - NGTD  On abx above    Plan of care discussed with Dr. Cunningham     Patient will likely be discharged to an LTAC    ID will follow.  Please call with questions.

## 2019-09-23 NOTE — THERAPY
PT eval re-order: Discussed with pt, pt reports he has been ambulating in room and to/from bathroom with FWW without assist. RN reports pt has been SBA. Unable to ambulate in halls due to prisoner status. Demonstrated PROM and instructed pt in AAROM for knee flexion and extension, pt with good return demo and will perform throughout the day. No change since initial eval on 9/11; pt with no acute PT needs at this time.

## 2019-09-23 NOTE — CARE PLAN
Problem: Communication  Goal: The ability to communicate needs accurately and effectively will improve  Outcome: PROGRESSING AS EXPECTED     Problem: Safety  Goal: Will remain free from injury  Outcome: PROGRESSING AS EXPECTED  Goal: Will remain free from falls  Outcome: PROGRESSING AS EXPECTED     Problem: Infection  Goal: Will remain free from infection  Outcome: PROGRESSING AS EXPECTED     Problem: Knowledge Deficit  Goal: Knowledge of disease process/condition, treatment plan, diagnostic tests, and medications will improve  Outcome: PROGRESSING AS EXPECTED  Goal: Knowledge of the prescribed therapeutic regimen will improve  Outcome: PROGRESSING AS EXPECTED     Problem: Pain Management  Goal: Pain level will decrease to patient's comfort goal  Outcome: PROGRESSING AS EXPECTED     Problem: Respiratory:  Goal: Respiratory status will improve  Outcome: PROGRESSING AS EXPECTED

## 2019-09-23 NOTE — THERAPY
Occupational Therapy Contact Note    OT re-order received and acknowledged. Pt screened for new OT needs, per pt report no changes in functional status. Pt remains independent for ADLs and functional transfers, no additional acute OT needs since previous evaluation, formal re-evaluation not indicated at this time.     Tennille Mcguire, OTR/L

## 2019-09-23 NOTE — PROGRESS NOTES
Hospital Medicine Daily Progress Note    Date of Service  9/23/2019    Chief Complaint  Left knee pain and swelling, transferred from [facility] [place]    Hospital Course   The patient is a 53-year-old male prisoner who was transferred from [facility] [place] on 9/10/2019 after being found to have a septic left knee joint.  [facility].  However, it did note a 7 x 2 cm fluid collection in the medial soft tissues of the knee.  While there, arthrocentesis was performed and synovial fluid analysis showed 112,000 WBCs with 90% neutrophils. Gram stain preliminarily showed gram-positive cocci.  He was subsequently placed on IV zosyn and vancomycin, and orthopedic surgery was consulted.  On 9/10/2019 the patient underwent irrigation debridement of the left knee, and intraoperative cultures done at that time were negative.  Despite negative culture results and washout, he continued to spike fevers and was not clinically improving.  ID was subsequently consulted for antibiotic management. On 9/19/2019 he continued to have pain and swelling so an MRI was done and showed a large joint effusion in addition to marrow edema involving the tibia and fibula centrally in the areas of the origin/attachment of the ACL. Orthopedic surgery was recontacted for possible additional surgical intervention and on 9/20/2019 the patient underwent repeat irrigation and debridement of the left knee with hemovac placement.      Interval Problem Update  Pain is improved today. Has been mobilizing more frequently around the room. LLE edema is improving. Has no other complaints and had no issues overnight.    CBC & BMP stable this morning.     Afebrile overnight, HR 90s, SBP 100s-120s, O2 saturations 98-99% on room air.    WBAT LLE.    Cultures negative so far.    Currently on IV ceftriaxone and vancomycin.  Infectious disease planning on 4 weeks of IV antibiotics from 9/10/2019,  anticipated stop date is 10/8/2019.    Per nursing, Hemovac has had no output since yesterday morning. Orthopedic PA Contreras notified for removal.    Consultants/Specialty  Orthopedic surgery  Infectious disease    Code Status  Full code    Disposition  Clinical for now.  Social work looking into possible placement options.    Review of Systems  Review of Systems   Constitutional: Negative for chills, diaphoresis, fever and malaise/fatigue.   Respiratory: Negative for cough and shortness of breath.    Cardiovascular: Positive for leg swelling (LLE). Negative for chest pain and palpitations.   Gastrointestinal: Negative for abdominal pain, constipation, diarrhea, nausea and vomiting.   Genitourinary: Negative for dysuria, frequency, hematuria and urgency.   Musculoskeletal: Positive for joint pain (Left knee, improving). Negative for myalgias.   Neurological: Positive for tingling (Mild, LLE, related to swelling, unchanged from yesterday) and focal weakness (LLE secondary to pain, improving). Negative for dizziness, sensory change, speech change, weakness and headaches.   Psychiatric/Behavioral: Negative for depression. The patient is not nervous/anxious and does not have insomnia.    All other systems reviewed and are negative.     Physical Exam  Temp:  [36.5 °C (97.7 °F)-37.7 °C (99.8 °F)] 36.6 °C (97.9 °F)  Pulse:  [] 92  Resp:  [16-17] 17  BP: (107-121)/(64-79) 121/79  SpO2:  [92 %-99 %] 92 %    Physical Exam   Constitutional: He is oriented to person, place, and time. He appears well-developed and well-nourished. He is active and cooperative. He does not appear ill. No distress.   Doing well on room air at time of exam.  Appears comfortable.    HENT:   Head: Normocephalic and atraumatic.   Eyes: Pupils are equal, round, and reactive to light. Conjunctivae are normal. Right eye exhibits no discharge. Left eye exhibits no discharge. No scleral icterus.   Neck: Normal range of motion and phonation normal. Neck  supple.   Cardiovascular: Normal rate, regular rhythm, normal heart sounds and intact distal pulses. Exam reveals no gallop and no friction rub.   No murmur heard.  Pulmonary/Chest: Effort normal and breath sounds normal. No accessory muscle usage or stridor. No respiratory distress. He has no decreased breath sounds. He has no wheezes. He has no rhonchi. He has no rales.   Abdominal: Soft. Bowel sounds are normal. He exhibits no distension and no abdominal bruit. There is no tenderness. There is no rigidity and no guarding.   Musculoskeletal: He exhibits edema (LLE, lower portion of the leg appears improved, though there is mildly more edema settled around the ankle area).        Left knee: He exhibits decreased range of motion and swelling (Improving).   Neurological: He is alert and oriented to person, place, and time. No cranial nerve deficit or sensory deficit. GCS eye subscore is 4. GCS verbal subscore is 5. GCS motor subscore is 6.   Skin: Skin is warm and dry. No rash noted. He is not diaphoretic. No pallor.        Psychiatric: He has a normal mood and affect. His speech is normal and behavior is normal. Judgment and thought content normal. Cognition and memory are normal.   Nursing note and vitals reviewed.    Fluids    Intake/Output Summary (Last 24 hours) at 9/23/2019 1513  Last data filed at 9/23/2019 1300  Gross per 24 hour   Intake 480 ml   Output 1200 ml   Net -720 ml     Laboratory  Recent Labs     09/21/19  0904 09/22/19 0220 09/23/19  0325   WBC 8.9 7.9 7.3   RBC 4.07* 4.11* 4.16*   HEMOGLOBIN 12.1* 11.8* 12.2*   HEMATOCRIT 36.1* 37.4* 38.2*   MCV 88.7 91.0 91.8   MCH 29.7 28.7 29.3   MCHC 33.5* 31.6* 31.9*   RDW 42.4 43.6 44.3   PLATELETCT 639* 623* 652*   MPV 9.1 8.9* 9.0     Recent Labs     09/21/19  0904 09/22/19 0220 09/23/19  0325   SODIUM 133* 135 137   POTASSIUM 4.1 3.8 4.3   CHLORIDE 99 99 102   CO2 25 29 26   GLUCOSE 110* 134* 137*   BUN 12 13 16   CREATININE 0.87 0.95 0.81   CALCIUM  8.8 8.7 8.9     Imaging  IR-MIDLINE CATHETER INSERTION WO GUIDANCE > AGE 3   Final Result                  Ultrasound-guided midline placement performed by qualified nursing staff    as above.          MR-KNEE-WITH & W/O LEFT   Final Result      1.  Very limited study due to extensive patient motion artifact.      2.  Large joint effusion with synovitis. There are also punctate low signal foci within the joint fluid suggesting areas of gas. Consideration should be given for septic arthritis.      3.  Limited evaluation of the anterior cruciate ligament. The anterior cruciate ligament is ill-defined with diffuse increased signal possibly representing a high-grade partial-thickness tear with mucoid degeneration. The amount of motion artifact    precludes the ability to completely exclude complete disruption of the anterior cruciate ligament.      4.  Marrow edema involving the distal femur and proximal tibia in the areas of the origin and attachment of the anterior cruciate ligament. This may represent reactive change related to chronic tear and mucoid degeneration. Early osteomyelitis cannot    definitely be excluded.      5.  Edema and enhancement of the soft tissues posterior and lateral to the knee consistent with cellulitis.      6.  Skin staples anterior to the patella.      US-EXTREMITY VENOUS LOWER UNILAT LEFT   Final Result      IR-MIDLINE CATHETER INSERTION WO GUIDANCE > AGE 3   Final Result                  Ultrasound-guided midline placement performed by qualified nursing staff    as above.          IR-US GUIDED PIV   Final Result    Ultrasound-guided PERIPHERAL IV INSERTION performed by    qualified nursing staff as above.            US-EXTREMITY VENOUS LOWER UNILAT LEFT   Final Result      US-FOREIGN FILM ULTRASOUND   Final Result         Assessment/Plan  * Septic arthritis of knee, left (HCC)- (present on admission)  Assessment & Plan  -S/p I&D's 9/10/2019 and 9/20/2019.  -Reports pain is improved  today.  Continue pain control using narcotics, baclofen, & nonpharmacologic methods of pain control (e.g. Ice packs).  Wean off as tolerated.  -The patient states he has been ambulating with a front wheel walker. Reaffirmed by corrections officers.  Also mobilizing around the room throughout the day.  Has been in a chair for every meal, per patient report.  -Currently on IV ceftriaxone and vancomycin.  Infectious disease is following and guiding antibiotic treatment.  Anticipated antibiotic end date is 10/8/2019.   -Continue working with PT/OT and the nursing staff on frequent mobilization.  -Orthopedic surgery following.    Normocytic anemia- (present on admission)  Assessment & Plan  -Anemia work-up largely unremarkable, though he does have a B12 level that is on the low end of normal.  Will replete IM x1.  Recommend rechecking level outpatient in approximately 1-2 months.  -No evidence of blood loss at present.  -H/H showed mild improvement this morning.    Acute hyperglycemia- (present on admission)  Assessment & Plan  -Mild.  137 this morning.  A1C done this admission was 6%.  -Will need continued outpatient monitoring/follow-up.    HLD (hyperlipidemia)- (present on admission)  Assessment & Plan  -Profile done 9/22/19 showed mildly low HDL but was otherwise normal.  Will continue home atorvastatin.   -Follow-up outpatient.     VTE prophylaxis: Lovenox    -----------------------------------------------------------------------------------------------------------------------------------------------------  Electronically signed by:  Tabatha Tolliver, MSN, RN, APRN, ACNPC-AG, CCRN  Nurse Practitioner  [facility]  9/23/2019    3:13 PM

## 2019-09-23 NOTE — PROGRESS NOTES
Orthopaedic PA Progress Note    Interval changes:HV - scant output last 24 hr. Drain removed, tolerated well, dressing changed. Cx NGTD from 9/20. Cx + from 9/10.    ROS - Patient denies any new issues. No chest pain, dyspnea, or fever.  Pain well controlled.    /79   Pulse 92   Temp 36.6 °C (97.9 °F) (Temporal)   Resp 17   Ht 1.829 m (6')   Wt 113.6 kg (250 lb 7.1 oz)   SpO2 92%     Patient seen and examined  No acute distress  Breathing non labored  RRR  Surgical dressing is clean, dry, and intact. Patient clearly fires tibialis anterior, EHL, and gastrocnemius/soleus. Sensation is intact to light touch throughout superficial peroneal, deep peroneal, tibial, saphenous, and sural nerve distributions. Strong and palpable 2+ dorsalis pedis and posterior tibial pulses with capillary refill less than 2 seconds. No lower leg tenderness or discomfort.    Recent Labs     09/21/19  0904 09/22/19  0220 09/23/19  0325   WBC 8.9 7.9 7.3   RBC 4.07* 4.11* 4.16*   HEMOGLOBIN 12.1* 11.8* 12.2*   HEMATOCRIT 36.1* 37.4* 38.2*   MCV 88.7 91.0 91.8   MCH 29.7 28.7 29.3   MCHC 33.5* 31.6* 31.9*   RDW 42.4 43.6 44.3   PLATELETCT 639* 623* 652*   MPV 9.1 8.9* 9.0       Active Hospital Problems    Diagnosis   • Septic arthritis of knee, left (HCC) [M00.9]     Priority: High   • Hyponatremia [E87.1]     Priority: Low   • Thrombocytosis (HCC) [D47.3]     Priority: Low   • Normocytic anemia [D64.9]     Priority: Low   • Acute hyperglycemia [R73.9]     Priority: Low   • HLD (hyperlipidemia) [E78.5]     Priority: Low     Assessment/Plan:  POD#3/13 S/P Serial I+Ds L knee  Wt bearing status - AT with AROM and PROM  PT/OT-initiated  Wound care:dressing left in place.  Drains - out today  Ramos-no  Sutures out- 10-14 days post operatively  Antibiotics: no  DVT Prophylaxis- TEDS/SCDs/Foot pumps.   Lovenox: Start, Duration-until ambulatory > 150'  Future Procedures - not anticipated  Case Coordination for Discharge Planning -  Disposition pending above

## 2019-09-23 NOTE — PROGRESS NOTES
POD#3  S/P I&D knee  Awaiting operative cultures  WBAT  PT/OT for ROM  Keep HV until minimal output  ABX per ID

## 2019-09-24 LAB
ANION GAP SERPL CALC-SCNC: 7 MMOL/L (ref 0–11.9)
BUN SERPL-MCNC: 15 MG/DL (ref 8–22)
CALCIUM SERPL-MCNC: 8.9 MG/DL (ref 8.5–10.5)
CHLORIDE SERPL-SCNC: 102 MMOL/L (ref 96–112)
CO2 SERPL-SCNC: 26 MMOL/L (ref 20–33)
CREAT SERPL-MCNC: 0.89 MG/DL (ref 0.5–1.4)
ERYTHROCYTE [DISTWIDTH] IN BLOOD BY AUTOMATED COUNT: 43.7 FL (ref 35.9–50)
GLUCOSE SERPL-MCNC: 110 MG/DL (ref 65–99)
HCT VFR BLD AUTO: 35.3 % (ref 42–52)
HGB BLD-MCNC: 11.2 G/DL (ref 14–18)
MCH RBC QN AUTO: 28.5 PG (ref 27–33)
MCHC RBC AUTO-ENTMCNC: 31.7 G/DL (ref 33.7–35.3)
MCV RBC AUTO: 89.8 FL (ref 81.4–97.8)
PLATELET # BLD AUTO: 614 K/UL (ref 164–446)
PMV BLD AUTO: 8.7 FL (ref 9–12.9)
POTASSIUM SERPL-SCNC: 4.2 MMOL/L (ref 3.6–5.5)
RBC # BLD AUTO: 3.93 M/UL (ref 4.7–6.1)
SODIUM SERPL-SCNC: 135 MMOL/L (ref 135–145)
WBC # BLD AUTO: 7.4 K/UL (ref 4.8–10.8)

## 2019-09-24 PROCEDURE — 700102 HCHG RX REV CODE 250 W/ 637 OVERRIDE(OP): Performed by: HOSPITALIST

## 2019-09-24 PROCEDURE — A9270 NON-COVERED ITEM OR SERVICE: HCPCS | Performed by: HOSPITALIST

## 2019-09-24 PROCEDURE — 99232 SBSQ HOSP IP/OBS MODERATE 35: CPT | Performed by: INTERNAL MEDICINE

## 2019-09-24 PROCEDURE — 700102 HCHG RX REV CODE 250 W/ 637 OVERRIDE(OP): Performed by: INTERNAL MEDICINE

## 2019-09-24 PROCEDURE — 80048 BASIC METABOLIC PNL TOTAL CA: CPT

## 2019-09-24 PROCEDURE — 700105 HCHG RX REV CODE 258: Performed by: INTERNAL MEDICINE

## 2019-09-24 PROCEDURE — 700111 HCHG RX REV CODE 636 W/ 250 OVERRIDE (IP): Performed by: INTERNAL MEDICINE

## 2019-09-24 PROCEDURE — 770001 HCHG ROOM/CARE - MED/SURG/GYN PRIV*

## 2019-09-24 PROCEDURE — 85027 COMPLETE CBC AUTOMATED: CPT

## 2019-09-24 PROCEDURE — 36415 COLL VENOUS BLD VENIPUNCTURE: CPT

## 2019-09-24 PROCEDURE — A9270 NON-COVERED ITEM OR SERVICE: HCPCS | Performed by: INTERNAL MEDICINE

## 2019-09-24 RX ADMIN — OXYCODONE HYDROCHLORIDE 10 MG: 10 TABLET ORAL at 21:20

## 2019-09-24 RX ADMIN — OXYCODONE HYDROCHLORIDE 10 MG: 10 TABLET ORAL at 16:52

## 2019-09-24 RX ADMIN — ACETAMINOPHEN 650 MG: 325 TABLET, FILM COATED ORAL at 13:17

## 2019-09-24 RX ADMIN — OXYCODONE HYDROCHLORIDE 10 MG: 10 TABLET ORAL at 13:17

## 2019-09-24 RX ADMIN — OXYCODONE HYDROCHLORIDE 10 MG: 10 TABLET ORAL at 06:52

## 2019-09-24 RX ADMIN — ACETAMINOPHEN 650 MG: 325 TABLET, FILM COATED ORAL at 21:20

## 2019-09-24 RX ADMIN — SENNOSIDES, DOCUSATE SODIUM 2 TABLET: 50; 8.6 TABLET, FILM COATED ORAL at 06:39

## 2019-09-24 RX ADMIN — CEFTRIAXONE SODIUM 2 G: 2 INJECTION, POWDER, FOR SOLUTION INTRAMUSCULAR; INTRAVENOUS at 06:39

## 2019-09-24 RX ADMIN — ATORVASTATIN CALCIUM 20 MG: 20 TABLET, FILM COATED ORAL at 16:52

## 2019-09-24 ASSESSMENT — ENCOUNTER SYMPTOMS
DEPRESSION: 0
DIAPHORESIS: 0
TINGLING: 1
SPEECH CHANGE: 0
CHILLS: 0
WEAKNESS: 0
FOCAL WEAKNESS: 1
COUGH: 0
DIZZINESS: 0
ABDOMINAL PAIN: 0
VOMITING: 0
CONSTIPATION: 0
SHORTNESS OF BREATH: 0
DIARRHEA: 0
INSOMNIA: 0
FEVER: 0
NAUSEA: 0
MYALGIAS: 0
SENSORY CHANGE: 0
NERVOUS/ANXIOUS: 0
PALPITATIONS: 0
HEADACHES: 0

## 2019-09-24 ASSESSMENT — PATIENT HEALTH QUESTIONNAIRE - PHQ9
1. LITTLE INTEREST OR PLEASURE IN DOING THINGS: NOT AT ALL
2. FEELING DOWN, DEPRESSED, IRRITABLE, OR HOPELESS: NOT AT ALL
SUM OF ALL RESPONSES TO PHQ9 QUESTIONS 1 AND 2: 0

## 2019-09-24 NOTE — PROGRESS NOTES
Infectious Disease Progress Note    Author: Lew Rizzo M.D. Date & Time of service: 2019  9:22 AM    Chief Complaint:  FU Left septic knee  arthritis    Interval History:   Tmax 100.8 WBC 8.9 Ongoing left knee pain. Unable to stand or ambulate. Tolerating IV abx without issues.   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 Ongoing left knee pain 8/10 in severity with pain meds.    T-max 99.8 WBC 8.3 patient is doing well without any new issues to report.  PICC line placed today   T-max 99.9 WBC 11.7 ongoing left knee pain associated with swelling.  Denies any cough, sore throat, dysuria or diarrhea   afebrile WBC 8.6 patient's midline was dislodged overnight.  No changes in pain or swelling.  MRI reveals significant knee effusion   Tmax 100 WBC 8.9 pt is s/p repeat I&D yesterday. Ongoing left knee pain and swelling. Hungry and wants to eat   afebrile WBC 7.9 complaining of left lower extremity and foot swelling.  Operative cultures from 9/10+ Peptostreptococcus micros   afebrile, white count 7.3, states doing well, wound VAC removed.   Tmax 99.8. White count 7.4.  Reports continued improvement of the pain.  Considering transfer to either [facility] or [facility]    Review of Systems:  Review of Systems   Constitutional: Negative for chills, fever and malaise/fatigue.   Respiratory: Negative for cough and shortness of breath.    Cardiovascular: Positive for leg swelling.   Gastrointestinal: Negative for abdominal pain, constipation, diarrhea, nausea and vomiting.   Musculoskeletal: Positive for joint pain.        Left knee   Neurological: Negative for dizziness and headaches.   All other systems reviewed and are negative.  Improved.    Hemodynamics:  Temp (24hrs), Av.4 °C (99.4 °F), Min:36.8 °C (98.3 °F), Max:37.7 °C (99.9 °F)  Temperature: 36.8 °C (98.3 °F)  Pulse  Av.7  Min: 63  Max: 111   Blood Pressure: 111/77        Physical Exam:  Physical Exam   Constitutional: He is oriented to person, place, and time. He appears well-developed and well-nourished. No distress.   HENT:   Head: Normocephalic and atraumatic.   Fair dentition   Eyes: Pupils are equal, round, and reactive to light. Conjunctivae and EOM are normal.   Neck: Neck supple.   Cardiovascular: Normal rate, regular rhythm and normal heart sounds.   No murmur heard.  Pulmonary/Chest: Effort normal and breath sounds normal. No respiratory distress. He has no wheezes.   Abdominal: Soft. Bowel sounds are normal. He exhibits no distension. There is tenderness. There is no rebound and no guarding.   Musculoskeletal: He exhibits edema and tenderness.   Left knee with swelling, improved per patient, midline dry dressing noted, no extension of erythema beyond dressing, wound VAC has been removed.  Slight warmth over the lateral side.  Left upper extremity midline in place   Neurological: He is alert and oriented to person, place, and time.   Skin: Skin is warm and dry. He is not diaphoretic.   Multiple tattoos   Psychiatric: He has a normal mood and affect. His behavior is normal.   Vitals reviewed.      Meds:    Current Facility-Administered Medications:   •  cefTRIAXone (ROCEPHIN) IV  •  baclofen  •  enoxaparin  •  acetaminophen  •  atorvastatin  •  senna-docusate **AND** polyethylene glycol/lytes **AND** magnesium hydroxide **AND** bisacodyl  •  Notify provider if pain remains uncontrolled **AND** Use the numeric rating scale (NRS-11) on regular floors and Critical-Care Pain Observation Tool (CPOT) on ICUs/Trauma to assess pain **AND** Pulse Ox (Oximetry) **AND** Pharmacy Consult Request **AND** If patient difficult to arouse and/or has respiratory depression, stop any opiates that are currently infusing and call a Rapid Response. **AND** oxyCODONE immediate-release **AND** oxyCODONE immediate-release **AND** morphine injection  •  ondansetron  •  ondansetron  •   promethazine  •  promethazine  •  prochlorperazine    Labs:  Recent Labs     09/22/19 0220 09/23/19 0325 09/24/19  0526   WBC 7.9 7.3 7.4   RBC 4.11* 4.16* 3.93*   HEMOGLOBIN 11.8* 12.2* 11.2*   HEMATOCRIT 37.4* 38.2* 35.3*   MCV 91.0 91.8 89.8   MCH 28.7 29.3 28.5   RDW 43.6 44.3 43.7   PLATELETCT 623* 652* 614*   MPV 8.9* 9.0 8.7*     Recent Labs     09/22/19 0220 09/23/19 0325 09/24/19  0526   SODIUM 135 137 135   POTASSIUM 3.8 4.3 4.2   CHLORIDE 99 102 102   CO2 29 26 26   GLUCOSE 134* 137* 110*   BUN 13 16 15     Recent Labs     09/22/19 0220 09/23/19 0325 09/24/19  0526   CREATININE 0.95 0.81 0.89       Imaging:  Ir-us Guided Piv    Result Date: 9/15/2019  EXAMINATION:                                                                    HISTORY/REASON FOR EXAM:  Ultrasound Guided PIV.  TECHNIQUE/EXAM DESCRIPTION AND NUMBER OF VIEWS:  Peripheral IV insertion with ultrasound guidance.  The procedure was prepared using maximal sterile barrier technique including sterile gown, mask, cap, and donning of sterile gloves following appropriate hand hygiene and/or sterile scrub. Patient skin site was prepped with 2% Chlorhexidine solution.   FINDINGS: Peripheral IV insertion with Ultrasound Guidance was performed by qualified imaging nursing staff without the assistance of a Radiologist.      Ultrasound-guided PERIPHERAL IV INSERTION performed by qualified nursing staff as above.     Us-extremity Venous Lower Unilat Left    Result Date: 9/15/2019   Vascular Laboratory  CONCLUSIONS  No prior study is available for comparison.  Normal left lower extremity superficial and deep venous examination.  [facility]  Exam Date:     09/15/2019 08:40  Room #:     Inpatient  Priority:     Routine  Ht (in):             Wt (lb):  Ordering Physician:        MARIELOS OLIVARES  Referring Physician:       [id number]  Sonographer:               Lucila Wilson RVT, SOCO  Study Type:                 Complete Unilateral  Technical Quality:         Good  Age:    53    Gender:     M  MRN:    [id number]  :    1965      BSA:  Indications:     Pain in Limb, Edema  CPT Codes:       26358  ICD Codes:       729.5  782.3  History:         Septic left knee with pain and swelling  Limitations:  PROCEDURES:  Left lower extremity venous duplex imaging.  The following venous structures were evaluated: common femoral, profunda  femoral, greater saphenous, femoral, popliteal , peroneal and posterior  tibial veins.  Serial compression, augmentation maneuvers,  color and spectral Doppler  flow evaluations were performed.  FINDINGS:  Left lower extremity -  Complete color filling and compressibility with normal venous flow dynamics  including spontaneous flow, response to augmentation maneuvers, and  respiratory phasicity.  No superficial or deep venous thrombosis.  Flow was evaluated in the contralateral common femoral vein and normal  venous flow dynamics including spontaneous flow, respiratory phasic  variation and augmentation were demonstrated.  Kwan Atkins MD  (Electronically Signed)  Final Date:      15 September 2019                   09:53      Micro:  Results     Procedure Component Value Units Date/Time    CULTURE WOUND W/ GRAM STAIN [[id number]] Collected:  09/10/19 2309    Order Status:  Completed Specimen:  Wound Updated:  19     Significant Indicator NEG     Source WND     Site Left Knee     Culture Result No growth at 13 days.     Gram Stain Result Many WBCs.  Rare Gram positive cocci.      Narrative:       Surgery - swabs received    Anaerobic Culture [[id number]]  (Abnormal) Collected:  09/10/19 2309    Order Status:  Completed Specimen:  Wound Updated:  19     Significant Indicator POS     Source WND     Site Left Knee     Culture Result Growth noted after further incubation, see below for  organism identification.        Peptostreptococcus micros  Rare growth      Narrative:        Surgery - swabs received    Fungal Culture [[id number]] Collected:  09/20/19 1804    Order Status:  Completed Specimen:  Tissue Updated:  09/23/19 0858     Significant Indicator NEG     Source TISS     Site Left Knee     Culture Result Culture in progress.    Narrative:       Surgery Specimen    AFB Culture [[id number]] Collected:  09/20/19 1804    Order Status:  Completed Specimen:  Tissue Updated:  09/23/19 0858     Significant Indicator NEG     Source TISS     Site Left Knee     Culture Result Culture in progress.     AFB Smear Results No acid fast bacilli seen.    Narrative:       Surgery Specimen    CULTURE TISSUE W/ GRM STAIN [[id number]] Collected:  09/20/19 1804    Order Status:  Completed Specimen:  Tissue Updated:  09/23/19 0858     Significant Indicator NEG     Source TISS     Site Left Knee     Culture Result No growth at 72 hours.     Gram Stain Result Few WBCs.  No organisms seen.      Narrative:       Surgery Specimen    Anaerobic Culture [[id number]] Collected:  09/20/19 1804    Order Status:  Completed Specimen:  Tissue Updated:  09/23/19 0858     Significant Indicator NEG     Source TISS     Site Left Knee     Culture Result Culture in progress.    Narrative:       Surgery Specimen    GRAM STAIN [[id number]] Collected:  09/20/19 1804    Order Status:  Completed Specimen:  Tissue Updated:  09/21/19 2300     Significant Indicator .     Source TISS     Site Left Knee     Gram Stain Result Few WBCs.  No organisms seen.      Narrative:       Surgery Specimen    Acid Fast Stain [[id number]] Collected:  09/20/19 1804    Order Status:  Completed Specimen:  Tissue Updated:  09/21/19 2300     Significant Indicator NEG     Source TISS     Site Left Knee     AFB Smear Results No acid fast bacilli seen.    Narrative:       Surgery Specimen    BLOOD CULTURE [[id number]] Collected:  09/16/19 1006    Order Status:  Completed Specimen:  Blood from Peripheral Updated:  09/21/19 1100     Significant Indicator NEG      "Source BLD     Site PERIPHERAL     Culture Result No growth after 5 days of incubation.    Narrative:       Per Hospital Policy: Only change Specimen Src: to \"Line\" if  specified by physician order.  Right Hand    BLOOD CULTURE [[id number]] Collected:  09/16/19 1006    Order Status:  Completed Specimen:  Blood from Peripheral Updated:  09/21/19 1100     Significant Indicator NEG     Source BLD     Site PERIPHERAL     Culture Result No growth after 5 days of incubation.    Narrative:       Per Hospital Policy: Only change Specimen Src: to \"Line\" if  specified by physician order.  Left Hand          Assessment:  Active Hospital Problems    Diagnosis   • *Septic arthritis of knee, left (HCC) [M00.9]       ASSESSMENT/PLAN:      Deanna Moe is a 53 y.o.  admitted 9/10/2019. Pt has no significant past medical history.  He is in the custodial system and states that approximately 1 month ago he \"bumped\" his knee.  Since that time he had slowly progressive edema, redness and pain.  Symptoms progressed until just prior to admit when he had severe pain edema and was unable to ambulate.  He was initially brought to an outside facility ([facility] in [place]) and per notes arthrocentesis revealed markedly elevated WBC count and initial Gram stain with GPC's.  Per notes the outside facility arthrocentesis with WBC neutral count of 112,000.  He went to the OR on 9/10 for washout of the knee.     Left knee septic arthritis  Persistent low-grade temperatures  No leukocytosis  Injury some weeks ago and progressed, no prior his of knee injury and native joint   Arthrocentesis at [facility] with WBC of 112,000 confirmed verbally and gram stain with rare GPCs, no growth on culture and final at 72 hours.  They have no remaining specimen.  S/p I&D with synovectomy on 9/10. A significant amount of purulent fluid seen in the joint and extensive  synovitis per OP note  OR Gram stain + GPCs.  Anaerobic culture - rare growth " Peptostreptococcus micros  Doppler US - neg for DVT  Microbiology holding cultures for 14 days  MRI on 9/19 + large joint effusion  S/p repeat I&D on 9/20. Large hematoma found and evacuated  OR cultures from 9/19- NGTD  We will stop IV vancomycin  Continue IV ceftriaxone 2 g to 24 hours  Plan for 4 weeks of IV abx from 9/10  Stop date 10/08/19  Weekly CBC with differential, CMP while on IV antibiotics    Fevers.  Now resolved  Some low grade elevated temps noted  Low-grade fevers now improving given repeat I&D and hematoma evacuated on 9/20  Blood cultures 9/16 - NGTD  On abx above    Plan of care discussed with Dr. Dumont    Awaiting placement    ID will follow every few days. Keep Ortho follow-ups. Please call with questions.

## 2019-09-24 NOTE — PROGRESS NOTES
POD#4  S/P I&D knee  Drain removed  Awaiting final operative culture results  WBAT  PT/OT for ROM  ABX per ID

## 2019-09-24 NOTE — CARE PLAN
Problem: Pain Management  Goal: Pain level will decrease to patient's comfort goal  Outcome: PROGRESSING AS EXPECTED  Note:   Patient complains of Pain in L knee. Oxycodone administered when patient calls. Will continue to monitor.      Problem: Mobility  Goal: Risk for activity intolerance will decrease  Outcome: PROGRESSING AS EXPECTED

## 2019-09-24 NOTE — PROGRESS NOTES
[facility] Medicine Daily Progress Note    Date of Service  9/24/2019    Chief Complaint  Left knee pain and swelling, transferred from [facility]    Hospital Course   The patient is a 53-year-old male prisoner who was transferred from [facility] on 9/10/2019 after being found to have a septic left knee joint.  [facility]  However, it did note a 7 x 2 cm fluid collection in the medial soft tissues of the knee.  While there, arthrocentesis was performed and synovial fluid analysis showed 112,000 WBCs with 90% neutrophils. Gram stain preliminarily showed gram-positive cocci.  He was subsequently placed on IV zosyn and vancomycin, and orthopedic surgery was consulted.  On 9/10/2019 the patient underwent irrigation debridement of the left knee, and intraoperative cultures done at that time were negative.  Despite negative culture results and washout, he continued to spike fevers and was not clinically improving.  ID was subsequently consulted for antibiotic management. On 9/19/2019 he continued to have pain and swelling so an MRI was done and showed a large joint effusion in addition to marrow edema involving the tibia and fibula centrally in the areas of the origin/attachment of the ACL. Orthopedic surgery was recontacted for possible additional surgical intervention and on 9/20/2019 the patient underwent repeat irrigation and debridement of the left knee with hemovac placement.  Postoperatively he has been monitored on the orthopedic floor and has showed continued clinical improvement.  The Hemovac was discontinued on 9/23/2019.  At this time there are no plans for future orthopedic procedures.      Interval Problem Update  Pain and edema continue to improve.  He is able to toe-touch weight-bear on his left lower extremity now.  Reports that his tingling has also started to resolve now that the edema is less.  Otherwise has no  complaints or problems.  Says he slept well and has been frequently walking around his room.  His only desire for today is to shower.    CBC and BMP stable from prior.  Had interval 1 g drop in his hemoglobin overnight, now 11.2.    T-max 99.9 °F, HR 90s, SBP teens-120s, O2 saturations 93-95% on room air.    WBAT LLE.    Intraoperative cultures negative so far.    Currently on IV ceftriaxone and vancomycin.  Infectious disease planning on 4 weeks of IV antibiotics from 9/10/2019, anticipated stop date is 10/8/2019.    Consultants/Specialty  Orthopedic surgery  Infectious disease    Code Status  Full code    Disposition  Social work is working on his disposition.  We anticipate that he will be able to return to the [facility] for the remainder of his IV antibiotics.  Dr. Rizzo of infectious disease was asked to provide the IV antibiotic orders to  Doretha.    Review of Systems  Review of Systems   Constitutional: Negative for chills, diaphoresis, fever and malaise/fatigue.   Respiratory: Negative for cough and shortness of breath.    Cardiovascular: Positive for leg swelling (LLE, continues to improve). Negative for chest pain and palpitations.   Gastrointestinal: Negative for abdominal pain, constipation, diarrhea, nausea and vomiting.   Genitourinary: Negative for dysuria, frequency, hematuria and urgency.   Musculoskeletal: Positive for joint pain (Left knee, continues to improve). Negative for myalgias.   Neurological: Positive for tingling (Mild, LLE, much improving now that his edema is getting better) and focal weakness (LLE secondary to pain, improving, able to TTWB now). Negative for dizziness, sensory change, speech change, weakness and headaches.   Psychiatric/Behavioral: Negative for depression. The patient is not nervous/anxious and does not have insomnia.    All other systems reviewed and are negative.     Physical Exam  Temp:  [36.8 °C (98.3 °F)-37.7 °C (99.9 °F)] 36.8 °C (98.3  °F)  Pulse:  [91-98] 91  Resp:  [14-18] 16  BP: (110-128)/(61-83) 111/77  SpO2:  [93 %-97 %] 93 %    Physical Exam   Constitutional: He is oriented to person, place, and time. He appears well-developed and well-nourished. He is active and cooperative. He does not appear ill. No distress.   Doing well on room air at time of exam.  Appears comfortable.    HENT:   Head: Normocephalic and atraumatic.   Eyes: Pupils are equal, round, and reactive to light. Conjunctivae are normal. Right eye exhibits no discharge. Left eye exhibits no discharge. No scleral icterus.   Neck: Normal range of motion and phonation normal. Neck supple.   Cardiovascular: Normal rate, regular rhythm, normal heart sounds and intact distal pulses. Exam reveals no gallop and no friction rub.   No murmur heard.  Pulmonary/Chest: Effort normal and breath sounds normal. No accessory muscle usage or stridor. No respiratory distress. He has no decreased breath sounds. He has no wheezes. He has no rhonchi. He has no rales.   Abdominal: Soft. He exhibits no distension. Bowel sounds are decreased. There is no tenderness. There is no rigidity and no guarding.   Musculoskeletal: He exhibits edema (LLE, much improved overnight).        Left knee: He exhibits decreased range of motion (Improving) and swelling (Much improved overnight).   Hemovac discontinued 9/23/2019.   Neurological: He is alert and oriented to person, place, and time. No cranial nerve deficit or sensory deficit. Gait abnormal. GCS eye subscore is 4. GCS verbal subscore is 5. GCS motor subscore is 6.   Skin: Skin is warm and dry. No rash noted. He is not diaphoretic. No pallor.        Psychiatric: He has a normal mood and affect. His speech is normal and behavior is normal. Judgment and thought content normal. Cognition and memory are normal.   Nursing note and vitals reviewed.    Fluids    Intake/Output Summary (Last 24 hours) at 9/24/2019 1220  Last data filed at 9/24/2019 0800  Gross per 24  hour   Intake 840 ml   Output 925 ml   Net -85 ml     Laboratory  Recent Labs     09/22/19 0220 09/23/19 0325 09/24/19  0526   WBC 7.9 7.3 7.4   RBC 4.11* 4.16* 3.93*   HEMOGLOBIN 11.8* 12.2* 11.2*   HEMATOCRIT 37.4* 38.2* 35.3*   MCV 91.0 91.8 89.8   MCH 28.7 29.3 28.5   MCHC 31.6* 31.9* 31.7*   RDW 43.6 44.3 43.7   PLATELETCT 623* 652* 614*   MPV 8.9* 9.0 8.7*     Recent Labs     09/22/19 0220 09/23/19 0325 09/24/19  0526   SODIUM 135 137 135   POTASSIUM 3.8 4.3 4.2   CHLORIDE 99 102 102   CO2 29 26 26   GLUCOSE 134* 137* 110*   BUN 13 16 15   CREATININE 0.95 0.81 0.89   CALCIUM 8.7 8.9 8.9     Imaging  IR-MIDLINE CATHETER INSERTION WO GUIDANCE > AGE 3   Final Result                  Ultrasound-guided midline placement performed by qualified nursing staff    as above.          MR-KNEE-WITH & W/O LEFT   Final Result      1.  Very limited study due to extensive patient motion artifact.      2.  Large joint effusion with synovitis. There are also punctate low signal foci within the joint fluid suggesting areas of gas. Consideration should be given for septic arthritis.      3.  Limited evaluation of the anterior cruciate ligament. The anterior cruciate ligament is ill-defined with diffuse increased signal possibly representing a high-grade partial-thickness tear with mucoid degeneration. The amount of motion artifact    precludes the ability to completely exclude complete disruption of the anterior cruciate ligament.      4.  Marrow edema involving the distal femur and proximal tibia in the areas of the origin and attachment of the anterior cruciate ligament. This may represent reactive change related to chronic tear and mucoid degeneration. Early osteomyelitis cannot    definitely be excluded.      5.  Edema and enhancement of the soft tissues posterior and lateral to the knee consistent with cellulitis.      6.  Skin staples anterior to the patella.      US-EXTREMITY VENOUS LOWER UNILAT LEFT   Final Result       IR-MIDLINE CATHETER INSERTION WO GUIDANCE > AGE 3   Final Result                  Ultrasound-guided midline placement performed by qualified nursing staff    as above.          IR-US GUIDED PIV   Final Result    Ultrasound-guided PERIPHERAL IV INSERTION performed by    qualified nursing staff as above.            US-EXTREMITY VENOUS LOWER UNILAT LEFT   Final Result      US-FOREIGN FILM ULTRASOUND   Final Result         Assessment/Plan  * Septic arthritis of knee, left (HCC)- (present on admission)  Assessment & Plan  -S/p I&D's 9/10/2019 and 9/20/2019.  -Pain and swelling continue to improve.  Continue pain control using narcotics, baclofen, & nonpharmacologic methods of pain control (e.g. Ice packs).  Wean off as tolerated.  -The patient states he has been ambulating with a front wheel walker.  Now able to TTWB.  Also mobilizing around the room throughout the day.  Has been in a chair for every meal, per patient report.  -Currently on IV ceftriaxone and vancomycin.  Infectious disease is following and guiding antibiotic treatment.  Anticipated antibiotic end date is 10/8/2019.  ID to provide IV antibiotic orders to social work so they can arrange for IV infusions at the [facility].  -Continue working with PT/OT and the nursing staff on frequent mobilization.  -Orthopedic surgery following.  No further surgical plans according to their last note.    Normocytic anemia- (present on admission)  Assessment & Plan  -Anemia work-up largely unremarkable, though he does have a B12 level that is on the low end of normal.  Will replete IM x1.  Recommend rechecking level outpatient in approximately 1-2 months.  -No evidence of blood loss at present.  -Hemoglobin with 1 g drop overnight, now 11.2.    Acute hyperglycemia- (present on admission)  Assessment & Plan  -Mild.  110 this morning.  A1C done this admission was 6%.  -Will need continued outpatient monitoring/follow-up.    HLD (hyperlipidemia)- (present on  admission)  Assessment & Plan  -Profile done 9/22/19 showed mildly low HDL but was otherwise normal.  Will continue home atorvastatin.   -Follow-up outpatient.     VTE prophylaxis: Lovenox    -----------------------------------------------------------------------------------------------------------------------------------------------------  Electronically signed by:  Tabatha Tolliver, KAE, RN, APRN, ACNPC-AG, CCRN  Nurse Practitioner  [facility]  9/24/2019    12:20 PM

## 2019-09-24 NOTE — PROGRESS NOTES
Pt A&O X4, amb with SBA  IV intact  No c/o CP, SOB  Skin intact  VSS  Dressing to left knee clean, dry & intact       Will continue to monitor

## 2019-09-24 NOTE — CARE PLAN
Call light within reach. Pt calls appropriately       Problem: Communication  Goal: The ability to communicate needs accurately and effectively will improve  Outcome: PROGRESSING AS EXPECTED     Problem: Safety  Goal: Will remain free from injury  Outcome: PROGRESSING AS EXPECTED

## 2019-09-24 NOTE — DISCHARGE PLANNING
LSW left VM with high [facility] DIANNE Toro asking for a call back to discuss if they can accept the Pt back with IV Abx.

## 2019-09-24 NOTE — DISCHARGE PLANNING
Anticipated Discharge Disposition: [facility] [place]    Action: LSW received a VM to call Maninder from [facility] back at [phone] ex [phone]. LSW left Maninder a return message asking for a call back to discuss if they they are able to take patients that need IV abx.     Barriers to Discharge: pend med clearance, [facility] ability to manage IV abx    Plan: follow up with [facility]

## 2019-09-25 VITALS
RESPIRATION RATE: 17 BRPM | TEMPERATURE: 97.2 F | SYSTOLIC BLOOD PRESSURE: 101 MMHG | WEIGHT: 250.44 LBS | HEIGHT: 72 IN | DIASTOLIC BLOOD PRESSURE: 65 MMHG | HEART RATE: 91 BPM | BODY MASS INDEX: 33.92 KG/M2 | OXYGEN SATURATION: 92 %

## 2019-09-25 LAB
ANION GAP SERPL CALC-SCNC: 8 MMOL/L (ref 0–11.9)
BACTERIA SPEC ANAEROBE CULT: ABNORMAL
BACTERIA SPEC ANAEROBE CULT: ABNORMAL
BACTERIA SPEC ANAEROBE CULT: NORMAL
BACTERIA WND AEROBE CULT: NORMAL
BUN SERPL-MCNC: 15 MG/DL (ref 8–22)
CALCIUM SERPL-MCNC: 9 MG/DL (ref 8.5–10.5)
CHLORIDE SERPL-SCNC: 101 MMOL/L (ref 96–112)
CO2 SERPL-SCNC: 26 MMOL/L (ref 20–33)
CREAT SERPL-MCNC: 0.82 MG/DL (ref 0.5–1.4)
ERYTHROCYTE [DISTWIDTH] IN BLOOD BY AUTOMATED COUNT: 43.9 FL (ref 35.9–50)
GLUCOSE SERPL-MCNC: 100 MG/DL (ref 65–99)
GRAM STN SPEC: NORMAL
HCT VFR BLD AUTO: 37.1 % (ref 42–52)
HGB BLD-MCNC: 12.1 G/DL (ref 14–18)
MAGNESIUM SERPL-MCNC: 2.2 MG/DL (ref 1.5–2.5)
MCH RBC QN AUTO: 29.3 PG (ref 27–33)
MCHC RBC AUTO-ENTMCNC: 32.6 G/DL (ref 33.7–35.3)
MCV RBC AUTO: 89.8 FL (ref 81.4–97.8)
PHOSPHATE SERPL-MCNC: 4.1 MG/DL (ref 2.5–4.5)
PLATELET # BLD AUTO: 583 K/UL (ref 164–446)
PMV BLD AUTO: 8.9 FL (ref 9–12.9)
POTASSIUM SERPL-SCNC: 4.3 MMOL/L (ref 3.6–5.5)
RBC # BLD AUTO: 4.13 M/UL (ref 4.7–6.1)
SIGNIFICANT IND 70042: ABNORMAL
SIGNIFICANT IND 70042: NORMAL
SIGNIFICANT IND 70042: NORMAL
SITE SITE: ABNORMAL
SITE SITE: NORMAL
SITE SITE: NORMAL
SODIUM SERPL-SCNC: 135 MMOL/L (ref 135–145)
SOURCE SOURCE: ABNORMAL
SOURCE SOURCE: NORMAL
SOURCE SOURCE: NORMAL
WBC # BLD AUTO: 7.1 K/UL (ref 4.8–10.8)

## 2019-09-25 PROCEDURE — 700102 HCHG RX REV CODE 250 W/ 637 OVERRIDE(OP): Performed by: HOSPITALIST

## 2019-09-25 PROCEDURE — 85027 COMPLETE CBC AUTOMATED: CPT

## 2019-09-25 PROCEDURE — 700111 HCHG RX REV CODE 636 W/ 250 OVERRIDE (IP): Performed by: INTERNAL MEDICINE

## 2019-09-25 PROCEDURE — 700105 HCHG RX REV CODE 258

## 2019-09-25 PROCEDURE — A9270 NON-COVERED ITEM OR SERVICE: HCPCS | Performed by: HOSPITALIST

## 2019-09-25 PROCEDURE — 84100 ASSAY OF PHOSPHORUS: CPT

## 2019-09-25 PROCEDURE — 80048 BASIC METABOLIC PNL TOTAL CA: CPT

## 2019-09-25 PROCEDURE — 99239 HOSP IP/OBS DSCHRG MGMT >30: CPT | Performed by: INTERNAL MEDICINE

## 2019-09-25 PROCEDURE — 83735 ASSAY OF MAGNESIUM: CPT

## 2019-09-25 PROCEDURE — 700105 HCHG RX REV CODE 258: Performed by: INTERNAL MEDICINE

## 2019-09-25 PROCEDURE — 36415 COLL VENOUS BLD VENIPUNCTURE: CPT

## 2019-09-25 RX ORDER — SODIUM CHLORIDE 9 MG/ML
INJECTION, SOLUTION INTRAVENOUS
Status: COMPLETED
Start: 2019-09-25 | End: 2019-09-25

## 2019-09-25 RX ORDER — BACLOFEN 10 MG/1
10 TABLET ORAL 3 TIMES DAILY PRN
Start: 2019-09-25

## 2019-09-25 RX ORDER — POLYETHYLENE GLYCOL 3350 17 G/17G
17 POWDER, FOR SOLUTION ORAL DAILY
Start: 2019-09-25

## 2019-09-25 RX ORDER — OXYCODONE HYDROCHLORIDE 10 MG/1
5-10 TABLET ORAL EVERY 4 HOURS PRN
Qty: 18 TAB | Refills: 0 | Status: SHIPPED | OUTPATIENT
Start: 2019-09-25 | End: 2019-09-28

## 2019-09-25 RX ORDER — ATORVASTATIN CALCIUM 20 MG/1
20 TABLET, FILM COATED ORAL EVERY EVENING
Start: 2019-09-25

## 2019-09-25 RX ORDER — ACETAMINOPHEN 325 MG/1
650 TABLET ORAL EVERY 6 HOURS PRN
Start: 2019-09-25

## 2019-09-25 RX ADMIN — OXYCODONE HYDROCHLORIDE 10 MG: 10 TABLET ORAL at 05:36

## 2019-09-25 RX ADMIN — ACETAMINOPHEN 650 MG: 325 TABLET, FILM COATED ORAL at 05:36

## 2019-09-25 RX ADMIN — SENNOSIDES, DOCUSATE SODIUM 2 TABLET: 50; 8.6 TABLET, FILM COATED ORAL at 05:36

## 2019-09-25 RX ADMIN — SODIUM CHLORIDE 500 ML: 9 INJECTION, SOLUTION INTRAVENOUS at 05:38

## 2019-09-25 RX ADMIN — ENOXAPARIN SODIUM 40 MG: 100 INJECTION SUBCUTANEOUS at 05:37

## 2019-09-25 RX ADMIN — CEFTRIAXONE SODIUM 2 G: 2 INJECTION, POWDER, FOR SOLUTION INTRAMUSCULAR; INTRAVENOUS at 05:38

## 2019-09-25 RX ADMIN — OXYCODONE HYDROCHLORIDE 10 MG: 10 TABLET ORAL at 12:54

## 2019-09-25 NOTE — DISCHARGE PLANNING
Anticipated Discharge Disposition: [facility]    Action: LSW faxed the ID note regarding the Pt abx to Maninder at [phone].    Barriers to Discharge: [facility] getting Abx supply    Plan: follow up with Maninder

## 2019-09-25 NOTE — PROGRESS NOTES
Orthopaedic Progress Note    Interval changes:  Patient doing better  Less pain in knee  Cleared for DC by ortho pending medicine and ID team clearance    ROS - Patient denies any new issues.  Pain well controlled.    /70   Pulse 87   Temp 36.7 °C (98.1 °F) (Temporal)   Resp 17   Ht 1.829 m (6')   Wt 113.6 kg (250 lb 7.1 oz)   SpO2 93%       Patient seen and examined  No acute distress  Breathing non labored  RRR  Left knee dressing CDI, no signs of progressing infection, DNVI, moves all toes, cap refill <2 sec.     Recent Labs     09/22/19  0220 09/23/19  0325 09/24/19  0526   WBC 7.9 7.3 7.4   RBC 4.11* 4.16* 3.93*   HEMOGLOBIN 11.8* 12.2* 11.2*   HEMATOCRIT 37.4* 38.2* 35.3*   MCV 91.0 91.8 89.8   MCH 28.7 29.3 28.5   MCHC 31.6* 31.9* 31.7*   RDW 43.6 44.3 43.7   PLATELETCT 623* 652* 614*   MPV 8.9* 9.0 8.7*       Active Hospital Problems    Diagnosis   • Septic arthritis of knee, left (HCC) [M00.9]     Priority: High   • Normocytic anemia [D64.9]     Priority: Low   • Acute hyperglycemia [R73.9]     Priority: Low   • HLD (hyperlipidemia) [E78.5]     Priority: Low       Assessment/Plan:  Cleared for DC by ortho pending medicine and ID team clearance  POD#4 S/P Irrigation and debridement, left septic knee  Wt bearing status - WBAT  Wound care/Drains - dressing changed every other day by nursing  Future Procedures - none planned   Lovenox: Start 9/16, Duration-until ambulatory > 150'  Sutures/Staples out- 10-14 days post operatively  PT/OT-initiated  Antibiotics: rocephin 2g IV QD  DVT Prophylaxis- TEDS/SCDs/Foot pumps  Ramos-none  Case Coordination for Discharge Planning - Disposition pending abx needs

## 2019-09-25 NOTE — PROGRESS NOTES
POD#5  S/P I&D knee  Awaiting final operative culture results  WBAT  PT/OT for ROM  ABX per ID  Case coordination

## 2019-09-25 NOTE — CARE PLAN
Problem: Communication  Goal: The ability to communicate needs accurately and effectively will improve  Outcome: PROGRESSING AS EXPECTED  Note:   Communicates effectively, call light in reach     Problem: Safety  Goal: Will remain free from injury  Outcome: PROGRESSING AS EXPECTED  Note:   Call light in reach, calls appropriately, bed locked and in lowest position

## 2019-09-25 NOTE — PROGRESS NOTES
Oriented, edema to L knee, VSS on room air, L knee dressing c/d/i, voids, Pain meds given. Continue plan of care

## 2019-09-25 NOTE — DISCHARGE INSTRUCTIONS
Discharge Instructions    Discharged to other by medical transportation with escort. Discharged via wheelchair, hospital escort: Yes.  Special equipment needed: Not Applicable    Be sure to schedule a follow-up appointment with your primary care doctor or any specialists as instructed.     Discharge Plan:   Diet Plan: Discussed  Activity Level: Discussed  Confirmed Follow up Appointment: Appointment Scheduled  Confirmed Symptoms Management: Discussed  Medication Reconciliation Updated: Yes  Influenza Vaccine Indication: Patient Refuses    I understand that a diet low in cholesterol, fat, and sodium is recommended for good health. Unless I have been given specific instructions below for another diet, I accept this instruction as my diet prescription.   Other diet: regular      Depression / Suicide Risk    As you are discharged from this [facility] facility, it is important to learn how to keep safe from harming yourself.    Recognize the warning signs:  · Abrupt changes in personality, positive or negative- including increase in energy   · Giving away possessions  · Change in eating patterns- significant weight changes-  positive or negative  · Change in sleeping patterns- unable to sleep or sleeping all the time   · Unwillingness or inability to communicate  · Depression  · Unusual sadness, discouragement and loneliness  · Talk of wanting to die  · Neglect of personal appearance   · Rebelliousness- reckless behavior  · Withdrawal from people/activities they love  · Confusion- inability to concentrate     If you or a loved one observes any of these behaviors or has concerns about self-harm, here's what you can do:  · Talk about it- your feelings and reasons for harming yourself  · Remove any means that you might use to hurt yourself (examples: pills, rope, extension cords, firearm)  · Get professional help from the community (Mental Health, Substance Abuse, psychological counseling)  · Do not be alone:Call your  Safe Contact- someone whom you trust who will be there for you.  · Call your local CRISIS HOTLINE 019-2009 or 128-691-2498  · Call your local Children's Mobile Crisis Response Team [place] (901) 395-6697 or www.Leho  · Call the toll free National Suicide Prevention Hotlines   · National Suicide Prevention Lifeline 566-935-CCBK (2504)  · National ZEB Line Network 800-SUICIDE (336-4425)

## 2019-09-25 NOTE — DISCHARGE SUMMARY
Discharge Summary    CHIEF COMPLAINT ON ADMISSION  Chief Complaint   Patient presents with   • Leg Pain     Reason for Admission  Left knee pain    CODE STATUS  Full Code    HPI & HOSPITAL COURSE  The patient is a 53-year-old male prisoner who was transferred from [facility] on 9/10/2019 after presenting for left knee pain and was found to have a septic left knee joint.  A DVT ultrasound was performed at the outlying hospital and was negative.  However, it did note a 7 x 2 cm fluid collection in the medial soft tissues of the knee.  While there, arthrocentesis was performed and synovial fluid analysis showed 112,000 WBCs with 90% neutrophils. Gram stain preliminarily showed gram-positive cocci.  He was subsequently placed on IV zosyn and vancomycin, and orthopedic surgery was consulted.  On 9/10/2019 the patient underwent irrigation debridement of the left knee, and intraoperative cultures done at that time were negative.  Despite negative culture results and washout, he continued to spike fevers and was not clinically improving.  ID was subsequently consulted for antibiotic management. On 9/19/2019 he continued to have pain and swelling so an MRI was done and showed a large joint effusion in addition to marrow edema involving the tibia and fibula centrally in the areas of the origin/attachment of the ACL. Orthopedic surgery was recontacted for possible additional surgical intervention and on 9/20/2019 the patient underwent repeat irrigation and debridement of the left knee with hemovac placement.  Postoperatively he has been monitored on the orthopedic floor and has showed continued clinical improvement. His postop edema in his left leg has nearly resolved now and he is getting more and more comfortable bearing weight on that leg. The hemovac was discontinued on 9/23/2019.  At this time there are no plans for future orthopedic procedures. He will require follow up with orthopedic surgery and potentially  infectious disease after hospital discharge. At their direction, he will need to be treated with daily IV ceftriaxone, with an antibiotic stop date of 10/8/19. They, in addition to orthopedic surgery, have cleared him for discharge. His vital signs and lab work have been stable and he is medically clear for discharge today. Of note, he had mild hyperglycemia during his hospitalization, with an A1C of 6%. This will need to be followed outpatient after hospital discharge.     Therefore, he is discharged in good and stable condition back to [facility] with continued IV antibiotics and close follow up.     The patient met 2-midnight criteria for an inpatient stay at the time of discharge.    FOLLOW UP ITEMS POST DISCHARGE  Orthopedic surgery  [facility] physician  Infectious disease prn.     DISCHARGE DIAGNOSES  Principal Problem:    Septic arthritis of knee, left (HCC) POA: Yes  Active Problems:    HLD (hyperlipidemia) POA: Yes    Acute hyperglycemia POA: Yes    Normocytic anemia POA: Yes  Resolved Problems:    Lactic acidosis POA: Yes    Hyponatremia POA: No    Thrombocytosis (HCC) POA: No    FOLLOW UP  Sergio Sylvester M.D.  [address]  [address]  [phone]    Schedule an appointment as soon as possible for a visit in 1 week      Lew Rizzo M.D.  [address]  [facility] [id number] [phone]    Schedule an appointment as soon as possible for a visit in 1 week      MEDICATIONS ON DISCHARGE     Medication List      Start taking these medications      Instructions   acetaminophen 325 MG Tabs  Commonly known as:  TYLENOL   Take 2 Tabs by mouth every 6 hours as needed for Mild Pain or Fever.  Dose:  650 mg     atorvastatin 20 MG Tabs  Commonly known as:  LIPITOR   Take 1 Tab by mouth every evening.  Dose:  20 mg     baclofen 10 MG Tabs  Commonly known as:  LIORESAL   Take 1 Tab by mouth 3 times a day as needed (muscle spasm).  Dose:  10 mg     NS SOLN 100 mL with cefTRIAXone 2  GM SOLR 2 g  Start taking on:  9/26/2019   2 g by Intravenous route every 24 hours for 12 days.  Dose:  2 g     oxyCODONE immediate release 10 MG immediate release tablet  Commonly known as:  ROXICODONE   Take 0.5-1 Tabs by mouth every four hours as needed for Severe Pain for up to 3 days.  Dose:  5-10 mg     polyethylene glycol/lytes Pack  Commonly known as:  MIRALAX   Doctor's comments:  Take while on narcotics  Take 1 Packet by mouth every day.  Dose:  17 g          Allergies  No Known Allergies    DIET  Orders Placed This Encounter   Procedures   • Diet Order Regular     Standing Status:   Standing     Number of Occurrences:   1     Order Specific Question:   Diet:     Answer:   Regular [1]     ACTIVITY  As tolerated.  Exercise encouraged.  Weight bearing as tolerated    LINES, DRAINS, AND WOUNDS  This is an automated list. Peripheral IVs will be removed prior to discharge.  Midline IV 09/20/19 Left;Upper Upper arm (Active)   Site Assessment Clean;Dry;Intact 9/25/2019  9:00 AM   Dressing Type Biopatch;Securing device;Transparent 9/25/2019  9:00 AM   Line Status Flushed;Saline locked 9/25/2019  9:00 AM   Dressing Status Clean;Dry;Intact 9/25/2019  9:00 AM   Dressing Intervention N/A 9/24/2019  9:00 PM   Dressing Change Due 09/21/19 9/20/2019  8:00 PM   Date Primary Tubing Changed 09/25/19 9/25/2019  6:00 AM   Date Secondary Tubing Changed 09/25/19 9/25/2019  6:00 AM   Infiltration Grading ([facility] 0 9/24/2019 10:00 AM   Phlebitis Scale ([facility]) 0 9/24/2019 10:00 AM                   MENTAL STATUS ON TRANSFER  Level of Consciousness: Alert  Orientation : Oriented x 4  Speech: Speech Clear    CONSULTATIONS  Orthopedic surgery  Infectious disease    PROCEDURES  9/10/19: I&D left knee by Dr. Coon.  9/18/19: Midline catheter placement.  9/20/19: I&D left knee by Dr. Sylvester.   9/20/19: Midline catheter placement. THIS WILL NEED TO BE DISCONTINUED AS SOON AS IV ANTIBIOTIC THERAPY IS COMPLETED.      LABORATORY  Lab Results   Component Value Date    SODIUM 135 09/25/2019    POTASSIUM 4.3 09/25/2019    CHLORIDE 101 09/25/2019    CO2 26 09/25/2019    GLUCOSE 100 (H) 09/25/2019    BUN 15 09/25/2019    CREATININE 0.82 09/25/2019      Lab Results   Component Value Date    WBC 7.1 09/25/2019    HEMOGLOBIN 12.1 (L) 09/25/2019    HEMATOCRIT 37.1 (L) 09/25/2019    PLATELETCT 583 (H) 09/25/2019      Assessment and plan:  * Septic arthritis of knee, left (HCC)- (present on admission)  Assessment & Plan  -S/p I&D's 9/10/2019 and 9/20/2019.  -Pain and swelling continue to improve.  Continue pain control using narcotics, baclofen, & nonpharmacologic methods of pain control (e.g. Ice packs).  Wean off as tolerated.  -The patient states he has been ambulating with a front wheel walker.  Now able to TTWB.  Also mobilizing around the room throughout the day.  Has been in a chair for every meal, per patient report.  -Currently on IV ceftriaxone. IV vancomycin discontinued. Was managed by ID. Anticipated antibiotic end date is 10/8/2019.    -Continue working with PT/OT and the nursing staff on frequent mobilization.  -Recommend orthopedic follow up in 7-10 days after hospital discharge.     Normocytic anemia- (present on admission)  Assessment & Plan  -Anemia work-up largely unremarkable, though he does have a B12 level that is on the low end of normal.  Will replete IM x1.  Recommend rechecking level outpatient in approximately 1-2 months.  -No evidence of blood loss at present.  -H/H stable.     Acute hyperglycemia- (present on admission)  Assessment & Plan  -Mild.  Ranged 100-159 during hospitalization. A1C done this admission was 6%.  -Will need continued outpatient monitoring/follow-up.    HLD (hyperlipidemia)- (present on admission)  Assessment & Plan  -Profile done 9/22/19 showed mildly low HDL but was otherwise normal.  Will continue home atorvastatin.   -Follow-up outpatient.    Total time of the discharge process  exceeds 36 minutes.    -----------------------------------------------------------------------------------------------------------------------------------------------------  Electronically signed by:  Tabatha Tolliver, MSN, RN, APRN, ACNPC-, CCRN  Nurse Practitioner  [facility]  9/25/2019    10:57 AM

## 2019-09-25 NOTE — DISCHARGE PLANNING
Anticipated Discharge Disposition: [facility]    Action: LSW faxed DC summary to Maninder who stated she will review and call this LSW with transport time.     Barriers to Discharge: none    Plan: DC once transport is set up by the intermediate

## 2019-10-16 LAB
FUNGUS SPEC CULT: NORMAL
SIGNIFICANT IND 70042: NORMAL
SITE SITE: NORMAL
SOURCE SOURCE: NORMAL

## 2019-11-16 LAB
MYCOBACTERIUM SPEC CULT: NORMAL
RHODAMINE-AURAMINE STN SPEC: NORMAL
SIGNIFICANT IND 70042: NORMAL
SITE SITE: NORMAL
SOURCE SOURCE: NORMAL

## 2022-01-14 NOTE — DISCHARGE PLANNING
Chart Review  Care Transition Team Assessment    Information Source  Orientation : Oriented x 4  Information Given By: Patient  Who is responsible for making decisions for patient? : Patient    Readmission Evaluation  Is this a readmission?: No    Elopement Risk  Legal Hold: Elopement Risk  Ambulatory or Self Mobile in Wheelchair: No-Not an Elopement Risk  Elopement Risk: Not at Risk for Elopement    Interdisciplinary Discharge Planning  Patient or legal guardian wants to designate a caregiver (see row info): No  Prior Services: None    Discharge Preparedness  What is your plan after discharge?: Other (comment)([facility])  What are your discharge supports?: Other (comment)  Prior Functional Level: Ambulatory, Independent with Activities of Daily Living, Independent with Medication Management  Difficulity with ADLs: None  Difficulity with IADLs: None    Functional Assesment  Prior Functional Level: Ambulatory, Independent with Activities of Daily Living, Independent with Medication Management         Vision / Hearing Impairment  Right Eye Vision: Wears Glasses  Left Eye Vision: Wears Glasses              Domestic Abuse  Have you ever been the victim of abuse or violence?: No  Physical Abuse or Sexual Abuse: No  Verbal Abuse or Emotional Abuse: No  Possible Abuse Reported to:: Not Applicable    Psychological Assessment  History of Substance Abuse: None  History of Psychiatric Problems: No  Non-compliant with Treatment: No  Newly Diagnosed Illness: No    Discharge Risks or Barriers  Discharge risks or barriers?: No    Anticipated Discharge Information  Anticipated discharge disposition: Other (comment)([facility])         Have Your Skin Lesions Been Treated?: been treated Is This A New Presentation, Or A Follow-Up?: Follow Up Skin Lesions How Severe Is Your Skin Lesion?: mild

## 2023-01-05 NOTE — ASSESSMENT & PLAN NOTE
-Mild.  Ranged 100-159 during hospitalization. A1C done this admission was 6%.  -Will need continued outpatient monitoring/follow-up.   patient lethargic and stays in bed

## (undated) DEVICE — SLEEVE, VASO, THIGH, MED

## (undated) DEVICE — PAD LAP STERILE 18 X 18 - (5/PK 40PK/CA)

## (undated) DEVICE — BANDAGE ELASTIC 6 HONEYCOMB - 6X5YD LF (20/CA)"

## (undated) DEVICE — KIT EVACUATER 3 SPRING PVC LF 1/8 DRAIN SIZE (10EA/CA)"

## (undated) DEVICE — SET LEADWIRE 5 LEAD BEDSIDE DISPOSABLE ECG (1SET OF 5/EA)

## (undated) DEVICE — HEAD HOLDER JUNIOR/ADULT

## (undated) DEVICE — SUTURE 0 VICRYL PLUS CT-1 - 8 X 18 INCH (12/BX)

## (undated) DEVICE — PADDING CAST 4 IN STERILE - 4 X 4 YDS (24/CA)

## (undated) DEVICE — KIT ROOM DECONTAMINATION

## (undated) DEVICE — LACTATED RINGERS INJ 1000 ML - (14EA/CA 60CA/PF)

## (undated) DEVICE — KIT ANESTHESIA W/CIRCUIT & 3/LT BAG W/FILTER (20EA/CA)

## (undated) DEVICE — CUFF TOURNIQUET 44 X 4 ONE PORT DISP - STERILE (10/CA)

## (undated) DEVICE — PACK LOWER EXTREMITY - (2/CA)

## (undated) DEVICE — SUCTION INSTRUMENT YANKAUER BULBOUS TIP W/O VENT (50EA/CA)

## (undated) DEVICE — SENSOR SPO2 NEO LNCS ADHESIVE (20/BX) SEE USER NOTES

## (undated) DEVICE — HANDPIECE 10FT INTPLS SCT PLS IRRIGATION HAND CONTROL SET (6/PK)

## (undated) DEVICE — WATER IRRIG. STER. 1500 ML - (9/CA)

## (undated) DEVICE — BANDAGE ROLL STERILE BULKEE 4.5 IN X 4 YD (100EA/CA)

## (undated) DEVICE — PADDING CAST 6 IN STERILE - 6 X 4 YDS (24/CA)

## (undated) DEVICE — CHLORAPREP 26 ML APPLICATOR - ORANGE TINT(25/CA)

## (undated) DEVICE — BLADE SURGICAL #15 - (50/BX 3BX/CA)

## (undated) DEVICE — ELECTRODE DUAL RETURN W/ CORD - (50/PK)

## (undated) DEVICE — SUTURE 2-0 VICRYL PLUS CT-1 - 8 X 18 INCH(12/BX)

## (undated) DEVICE — PROTECTOR ULNA NERVE - (36PR/CA)

## (undated) DEVICE — DRAPE LOWER EXTREMETY - (6/CA)

## (undated) DEVICE — SODIUM CHL IRRIGATION 0.9% 1000ML (12EA/CA)

## (undated) DEVICE — TUBING CLEARLINK DUO-VENT - C-FLO (48EA/CA)

## (undated) DEVICE — BANDAGE ELASTIC 4 HONEYCOMB - 4"X5YD LF (20/CA)"

## (undated) DEVICE — SUTURE 1 VICRYL PLUS CTX - 8 X 18 INCH (12/BX)

## (undated) DEVICE — GOWN WARMING STANDARD FLEX - (30/CA)

## (undated) DEVICE — DRAPE U ORTHOPEDIC - (10/BX)

## (undated) DEVICE — MASK ANESTHESIA ADULT  - (100/CA)

## (undated) DEVICE — CANISTER SUCTION 3000ML MECHANICAL FILTER AUTO SHUTOFF MEDI-VAC NONSTERILE LF DISP  (40EA/CA)

## (undated) DEVICE — NEEDLE SAFETY 18 GA X 1 1/2 IN (100EA/BX)

## (undated) DEVICE — GLOVE BIOGEL PI ORTHO SZ 8 PF LF (40PR/BX)

## (undated) DEVICE — SET EXTENSION WITH 2 PORTS (48EA/CA) ***PART #2C8610 IS A SUBSTITUTE*****

## (undated) DEVICE — NEPTUNE 4 PORT MANIFOLD - (20/PK)

## (undated) DEVICE — STAPLER SKIN DISP - (6/BX 10BX/CA) VISISTAT

## (undated) DEVICE — GLOVE BIOGEL SZ 7 SURGICAL PF LTX - (50PR/BX 4BX/CA)

## (undated) DEVICE — PACK MAJOR ORTHO - (2EA/CA)

## (undated) DEVICE — GLOVE BIOGEL SZ 7.5 SURGICAL PF LTX - (50PR/BX 4BX/CA)

## (undated) DEVICE — ELECTRODE 850 FOAM ADHESIVE - HYDROGEL RADIOTRNSPRNT (50/PK)

## (undated) DEVICE — GLOVE BIOGEL PI INDICATOR SZ 7.0 SURGICAL PF LF - (50/BX 4BX/CA)

## (undated) DEVICE — SUTURE GENERAL

## (undated) DEVICE — TIP INTPLS HFLO ML ORFC BTRY - (12/CS)  FOR SURGILAV

## (undated) DEVICE — GLOVE BIOGEL SZ 8 SURGICAL PF LTX - (50PR/BX 4BX/CA)

## (undated) DEVICE — GLOVE BIOGEL INDICATOR SZ 8 SURGICAL PF LTX - (50/BX 4BX/CA)

## (undated) DEVICE — BLADE SURGICAL #10 - (50/BX)